# Patient Record
Sex: FEMALE | Race: WHITE | Employment: UNEMPLOYED | URBAN - METROPOLITAN AREA
[De-identification: names, ages, dates, MRNs, and addresses within clinical notes are randomized per-mention and may not be internally consistent; named-entity substitution may affect disease eponyms.]

---

## 2020-04-03 ENCOUNTER — APPOINTMENT (OUTPATIENT)
Dept: VASCULAR SURGERY | Age: 60
DRG: 045 | End: 2020-04-03
Attending: INTERNAL MEDICINE
Payer: MEDICAID

## 2020-04-03 ENCOUNTER — APPOINTMENT (OUTPATIENT)
Dept: CT IMAGING | Age: 60
DRG: 045 | End: 2020-04-03
Attending: STUDENT IN AN ORGANIZED HEALTH CARE EDUCATION/TRAINING PROGRAM
Payer: MEDICAID

## 2020-04-03 ENCOUNTER — HOSPITAL ENCOUNTER (INPATIENT)
Age: 60
LOS: 2 days | Discharge: HOME HEALTH | DRG: 045 | End: 2020-04-05
Attending: STUDENT IN AN ORGANIZED HEALTH CARE EDUCATION/TRAINING PROGRAM | Admitting: INTERNAL MEDICINE
Payer: MEDICAID

## 2020-04-03 ENCOUNTER — APPOINTMENT (OUTPATIENT)
Dept: MRI IMAGING | Age: 60
DRG: 045 | End: 2020-04-03
Attending: INTERNAL MEDICINE
Payer: MEDICAID

## 2020-04-03 ENCOUNTER — APPOINTMENT (OUTPATIENT)
Dept: NON INVASIVE DIAGNOSTICS | Age: 60
DRG: 045 | End: 2020-04-03
Attending: INTERNAL MEDICINE
Payer: MEDICAID

## 2020-04-03 DIAGNOSIS — R47.01 APHASIA: Primary | ICD-10-CM

## 2020-04-03 DIAGNOSIS — R53.1 RIGHT SIDED WEAKNESS: ICD-10-CM

## 2020-04-03 DIAGNOSIS — E78.5 DYSLIPIDEMIA: ICD-10-CM

## 2020-04-03 DIAGNOSIS — Q21.12 PFO (PATENT FORAMEN OVALE): ICD-10-CM

## 2020-04-03 DIAGNOSIS — I65.23 BILATERAL CAROTID ARTERY STENOSIS: ICD-10-CM

## 2020-04-03 DIAGNOSIS — I63.9 ACUTE CVA (CEREBROVASCULAR ACCIDENT) (HCC): ICD-10-CM

## 2020-04-03 PROBLEM — E03.9 HYPOTHYROIDISM: Status: ACTIVE | Noted: 2020-04-03

## 2020-04-03 PROBLEM — J44.9 COPD (CHRONIC OBSTRUCTIVE PULMONARY DISEASE) (HCC): Status: ACTIVE | Noted: 2020-04-03

## 2020-04-03 PROBLEM — I10 HTN (HYPERTENSION), BENIGN: Status: ACTIVE | Noted: 2020-04-03

## 2020-04-03 LAB
ALBUMIN SERPL-MCNC: 3.8 G/DL (ref 3.5–5)
ALBUMIN/GLOB SERPL: 1 {RATIO} (ref 1.1–2.2)
ALP SERPL-CCNC: 79 U/L (ref 45–117)
ALT SERPL-CCNC: 20 U/L (ref 12–78)
ANION GAP SERPL CALC-SCNC: 6 MMOL/L (ref 5–15)
APPEARANCE UR: CLEAR
AST SERPL-CCNC: 9 U/L (ref 15–37)
AV VELOCITY RATIO: 0.59
BACTERIA URNS QL MICRO: NEGATIVE /HPF
BASOPHILS # BLD: 0 K/UL (ref 0–0.1)
BASOPHILS NFR BLD: 0 % (ref 0–1)
BILIRUB SERPL-MCNC: 0.2 MG/DL (ref 0.2–1)
BILIRUB UR QL: NEGATIVE
BUN SERPL-MCNC: 13 MG/DL (ref 6–20)
BUN/CREAT SERPL: 18 (ref 12–20)
CALCIUM SERPL-MCNC: 9.2 MG/DL (ref 8.5–10.1)
CHLORIDE SERPL-SCNC: 110 MMOL/L (ref 97–108)
CO2 SERPL-SCNC: 22 MMOL/L (ref 21–32)
COLOR UR: ABNORMAL
COMMENT, HOLDF: NORMAL
CREAT SERPL-MCNC: 0.74 MG/DL (ref 0.55–1.02)
DIFFERENTIAL METHOD BLD: ABNORMAL
ECHO AO ROOT DIAM: 3.4 CM
ECHO AV AREA PEAK VELOCITY: 1.9 CM2
ECHO AV PEAK GRADIENT: 6.6 MMHG
ECHO AV PEAK VELOCITY: 128.13 CM/S
ECHO EST RA PRESSURE: 3 MMHG
ECHO LA MAJOR AXIS: 2.93 CM
ECHO LA TO AORTIC ROOT RATIO: 0.86
ECHO LA VOL 2C: 36.59 ML (ref 22–52)
ECHO LA VOL 4C: 25.82 ML (ref 22–52)
ECHO LA VOL BP: 34.49 ML (ref 22–52)
ECHO LA VOL/BSA BIPLANE: 22.19 ML/M2 (ref 16–28)
ECHO LA VOLUME INDEX A2C: 23.54 ML/M2 (ref 16–28)
ECHO LA VOLUME INDEX A4C: 16.61 ML/M2 (ref 16–28)
ECHO LV INTERNAL DIMENSION DIASTOLIC: 4.16 CM (ref 3.9–5.3)
ECHO LV INTERNAL DIMENSION SYSTOLIC: 2.99 CM
ECHO LV IVSD: 0.82 CM (ref 0.6–0.9)
ECHO LV MASS 2D: 100.2 G (ref 67–162)
ECHO LV MASS INDEX 2D: 64.5 G/M2 (ref 43–95)
ECHO LV POSTERIOR WALL DIASTOLIC: 0.68 CM (ref 0.6–0.9)
ECHO LVOT DIAM: 2.01 CM
ECHO LVOT PEAK GRADIENT: 2.3 MMHG
ECHO LVOT PEAK VELOCITY: 75.14 CM/S
ECHO MV A VELOCITY: 91.74 CM/S
ECHO MV E DECELERATION TIME (DT): 254.2 MS
ECHO MV E VELOCITY: 68.84 CM/S
ECHO MV E/A RATIO: 0.75
ECHO MV REGURGITANT PEAK GRADIENT: 32.4 MMHG
ECHO MV REGURGITANT PEAK VELOCITY: 284.82 CM/S
ECHO PULMONARY ARTERY SYSTOLIC PRESSURE (PASP): 29.3 MMHG
ECHO PV MAX VELOCITY: 68.34 CM/S
ECHO PV PEAK GRADIENT: 1.9 MMHG
ECHO RIGHT VENTRICULAR SYSTOLIC PRESSURE (RVSP): 29.3 MMHG
ECHO RV INTERNAL DIMENSION: 3.01 CM
ECHO TV REGURGITANT MAX VELOCITY: 256.57 CM/S
ECHO TV REGURGITANT PEAK GRADIENT: 26.3 MMHG
EOSINOPHIL # BLD: 0.1 K/UL (ref 0–0.4)
EOSINOPHIL NFR BLD: 1 % (ref 0–7)
EPITH CASTS URNS QL MICRO: ABNORMAL /LPF
ERYTHROCYTE [DISTWIDTH] IN BLOOD BY AUTOMATED COUNT: 13.4 % (ref 11.5–14.5)
EST. AVERAGE GLUCOSE BLD GHB EST-MCNC: 114 MG/DL
GLOBULIN SER CALC-MCNC: 3.8 G/DL (ref 2–4)
GLUCOSE SERPL-MCNC: 111 MG/DL (ref 65–100)
GLUCOSE UR STRIP.AUTO-MCNC: NEGATIVE MG/DL
HBA1C MFR BLD: 5.6 % (ref 4–5.6)
HCT VFR BLD AUTO: 46.8 % (ref 35–47)
HGB BLD-MCNC: 15.5 G/DL (ref 11.5–16)
HGB UR QL STRIP: ABNORMAL
HYALINE CASTS URNS QL MICRO: ABNORMAL /LPF (ref 0–5)
IMM GRANULOCYTES # BLD AUTO: 0 K/UL (ref 0–0.04)
IMM GRANULOCYTES NFR BLD AUTO: 0 % (ref 0–0.5)
KETONES UR QL STRIP.AUTO: NEGATIVE MG/DL
LEUKOCYTE ESTERASE UR QL STRIP.AUTO: NEGATIVE
LVFS 2D: 28.31 %
LYMPHOCYTES # BLD: 2.5 K/UL (ref 0.8–3.5)
LYMPHOCYTES NFR BLD: 29 % (ref 12–49)
MCH RBC QN AUTO: 32.4 PG (ref 26–34)
MCHC RBC AUTO-ENTMCNC: 33.1 G/DL (ref 30–36.5)
MCV RBC AUTO: 97.9 FL (ref 80–99)
MONOCYTES # BLD: 0.7 K/UL (ref 0–1)
MONOCYTES NFR BLD: 8 % (ref 5–13)
MV DEC SLOPE: 2.71
NEUTS SEG # BLD: 5.3 K/UL (ref 1.8–8)
NEUTS SEG NFR BLD: 62 % (ref 32–75)
NITRITE UR QL STRIP.AUTO: NEGATIVE
NRBC # BLD: 0 K/UL (ref 0–0.01)
NRBC BLD-RTO: 0 PER 100 WBC
PH UR STRIP: 7 [PH] (ref 5–8)
PLATELET # BLD AUTO: 223 K/UL (ref 150–400)
PMV BLD AUTO: 8.8 FL (ref 8.9–12.9)
POTASSIUM SERPL-SCNC: 4.4 MMOL/L (ref 3.5–5.1)
PROT SERPL-MCNC: 7.6 G/DL (ref 6.4–8.2)
PROT UR STRIP-MCNC: NEGATIVE MG/DL
PULMONARY ARTERY END DIASTOLIC PRESSURE: 6.7 MMHG
PULMONARY ARTERY MEAN PRESURE: 14.3 MMHG
PV END DIASTOLIC VELOCITY: 1 MMHG
RBC # BLD AUTO: 4.78 M/UL (ref 3.8–5.2)
RBC #/AREA URNS HPF: ABNORMAL /HPF (ref 0–5)
SAMPLES BEING HELD,HOLD: NORMAL
SODIUM SERPL-SCNC: 138 MMOL/L (ref 136–145)
SP GR UR REFRACTOMETRY: 1.03 (ref 1–1.03)
T3FREE SERPL-MCNC: 3.4 PG/ML (ref 2.2–4)
T4 FREE SERPL-MCNC: 1.4 NG/DL (ref 0.8–1.5)
TROPONIN I SERPL-MCNC: <0.05 NG/ML
TSH SERPL DL<=0.05 MIU/L-ACNC: 0.8 UIU/ML (ref 0.36–3.74)
UR CULT HOLD, URHOLD: NORMAL
UROBILINOGEN UR QL STRIP.AUTO: 0.2 EU/DL (ref 0.2–1)
WBC # BLD AUTO: 8.6 K/UL (ref 3.6–11)
WBC URNS QL MICRO: ABNORMAL /HPF (ref 0–4)

## 2020-04-03 PROCEDURE — 84481 FREE ASSAY (FT-3): CPT

## 2020-04-03 PROCEDURE — 74011636320 HC RX REV CODE- 636/320

## 2020-04-03 PROCEDURE — 70450 CT HEAD/BRAIN W/O DYE: CPT

## 2020-04-03 PROCEDURE — 80053 COMPREHEN METABOLIC PANEL: CPT

## 2020-04-03 PROCEDURE — 92610 EVALUATE SWALLOWING FUNCTION: CPT

## 2020-04-03 PROCEDURE — 36415 COLL VENOUS BLD VENIPUNCTURE: CPT

## 2020-04-03 PROCEDURE — 85025 COMPLETE CBC W/AUTO DIFF WBC: CPT

## 2020-04-03 PROCEDURE — 84484 ASSAY OF TROPONIN QUANT: CPT

## 2020-04-03 PROCEDURE — 81001 URINALYSIS AUTO W/SCOPE: CPT

## 2020-04-03 PROCEDURE — 70496 CT ANGIOGRAPHY HEAD: CPT

## 2020-04-03 PROCEDURE — 99285 EMERGENCY DEPT VISIT HI MDM: CPT

## 2020-04-03 PROCEDURE — 80307 DRUG TEST PRSMV CHEM ANLYZR: CPT

## 2020-04-03 PROCEDURE — 74011250637 HC RX REV CODE- 250/637: Performed by: INTERNAL MEDICINE

## 2020-04-03 PROCEDURE — 65660000000 HC RM CCU STEPDOWN

## 2020-04-03 PROCEDURE — 93005 ELECTROCARDIOGRAM TRACING: CPT

## 2020-04-03 PROCEDURE — 83036 HEMOGLOBIN GLYCOSYLATED A1C: CPT

## 2020-04-03 PROCEDURE — 99218 HC RM OBSERVATION: CPT

## 2020-04-03 PROCEDURE — 93306 TTE W/DOPPLER COMPLETE: CPT

## 2020-04-03 PROCEDURE — 70551 MRI BRAIN STEM W/O DYE: CPT

## 2020-04-03 PROCEDURE — 93970 EXTREMITY STUDY: CPT

## 2020-04-03 PROCEDURE — 84439 ASSAY OF FREE THYROXINE: CPT

## 2020-04-03 PROCEDURE — 84443 ASSAY THYROID STIM HORMONE: CPT

## 2020-04-03 PROCEDURE — 74011250636 HC RX REV CODE- 250/636: Performed by: INTERNAL MEDICINE

## 2020-04-03 PROCEDURE — 92523 SPEECH SOUND LANG COMPREHEN: CPT

## 2020-04-03 RX ORDER — LEVOTHYROXINE SODIUM 175 UG/1
175 TABLET ORAL
COMMUNITY
End: 2021-01-12 | Stop reason: DRUGHIGH

## 2020-04-03 RX ORDER — SODIUM CHLORIDE 9 MG/ML
75 INJECTION, SOLUTION INTRAVENOUS CONTINUOUS
Status: DISPENSED | OUTPATIENT
Start: 2020-04-03 | End: 2020-04-04

## 2020-04-03 RX ORDER — ASPIRIN 325 MG
325 TABLET ORAL DAILY
Status: DISCONTINUED | OUTPATIENT
Start: 2020-04-04 | End: 2020-04-03

## 2020-04-03 RX ORDER — ENOXAPARIN SODIUM 100 MG/ML
40 INJECTION SUBCUTANEOUS EVERY 24 HOURS
Status: DISCONTINUED | OUTPATIENT
Start: 2020-04-03 | End: 2020-04-03

## 2020-04-03 RX ORDER — ACETAMINOPHEN 325 MG/1
650 TABLET ORAL
Status: DISCONTINUED | OUTPATIENT
Start: 2020-04-03 | End: 2020-04-05 | Stop reason: HOSPADM

## 2020-04-03 RX ORDER — ONDANSETRON 2 MG/ML
2 INJECTION INTRAMUSCULAR; INTRAVENOUS
Status: DISCONTINUED | OUTPATIENT
Start: 2020-04-03 | End: 2020-04-05 | Stop reason: HOSPADM

## 2020-04-03 RX ORDER — FAMOTIDINE 20 MG/1
20 TABLET, FILM COATED ORAL EVERY 12 HOURS
Status: DISCONTINUED | OUTPATIENT
Start: 2020-04-03 | End: 2020-04-05 | Stop reason: HOSPADM

## 2020-04-03 RX ORDER — GUAIFENESIN 100 MG/5ML
81 LIQUID (ML) ORAL DAILY
Status: DISCONTINUED | OUTPATIENT
Start: 2020-04-04 | End: 2020-04-05 | Stop reason: HOSPADM

## 2020-04-03 RX ORDER — ALBUTEROL SULFATE 90 UG/1
2 AEROSOL, METERED RESPIRATORY (INHALATION)
COMMUNITY

## 2020-04-03 RX ORDER — LOVASTATIN 40 MG/1
40 TABLET ORAL
COMMUNITY
End: 2020-04-05

## 2020-04-03 RX ORDER — IPRATROPIUM BROMIDE AND ALBUTEROL SULFATE 2.5; .5 MG/3ML; MG/3ML
3 SOLUTION RESPIRATORY (INHALATION)
Status: DISCONTINUED | OUTPATIENT
Start: 2020-04-03 | End: 2020-04-05 | Stop reason: HOSPADM

## 2020-04-03 RX ORDER — ACETAMINOPHEN 650 MG/1
650 SUPPOSITORY RECTAL
Status: DISCONTINUED | OUTPATIENT
Start: 2020-04-03 | End: 2020-04-05 | Stop reason: HOSPADM

## 2020-04-03 RX ORDER — ENOXAPARIN SODIUM 100 MG/ML
1 INJECTION SUBCUTANEOUS EVERY 12 HOURS
Status: DISCONTINUED | OUTPATIENT
Start: 2020-04-03 | End: 2020-04-04

## 2020-04-03 RX ORDER — RISEDRONATE SODIUM 35 MG/1
35 TABLET, FILM COATED ORAL
COMMUNITY
End: 2021-08-04

## 2020-04-03 RX ORDER — ATORVASTATIN CALCIUM 20 MG/1
80 TABLET, FILM COATED ORAL
Status: DISCONTINUED | OUTPATIENT
Start: 2020-04-03 | End: 2020-04-05 | Stop reason: HOSPADM

## 2020-04-03 RX ORDER — BUDESONIDE AND FORMOTEROL FUMARATE DIHYDRATE 160; 4.5 UG/1; UG/1
2 AEROSOL RESPIRATORY (INHALATION) 2 TIMES DAILY
COMMUNITY
End: 2022-04-04

## 2020-04-03 RX ORDER — LABETALOL HCL 20 MG/4 ML
20 SYRINGE (ML) INTRAVENOUS
Status: DISCONTINUED | OUTPATIENT
Start: 2020-04-03 | End: 2020-04-05 | Stop reason: HOSPADM

## 2020-04-03 RX ADMIN — ATORVASTATIN CALCIUM 80 MG: 20 TABLET, FILM COATED ORAL at 17:09

## 2020-04-03 RX ADMIN — ENOXAPARIN SODIUM 60 MG: 100 INJECTION SUBCUTANEOUS at 17:11

## 2020-04-03 RX ADMIN — SODIUM CHLORIDE 75 ML/HR: 900 INJECTION, SOLUTION INTRAVENOUS at 17:21

## 2020-04-03 RX ADMIN — FAMOTIDINE 20 MG: 20 TABLET, FILM COATED ORAL at 20:53

## 2020-04-03 RX ADMIN — IOPAMIDOL 100 ML: 755 INJECTION, SOLUTION INTRAVENOUS at 11:25

## 2020-04-03 RX ADMIN — ACETAMINOPHEN 650 MG: 325 TABLET ORAL at 17:11

## 2020-04-03 NOTE — PROGRESS NOTES
Spiritual Care Assessment/Progress Note  [address]      NAME: Clarita Ramirez      MRN: [id number]  AGE: 63 y.o. SEX: female  Mandaen Affiliation: No Hindu   Language: English     4/3/2020     Total Time (in minutes): 11     Spiritual Assessment begun in [facility] EMERGENCY DEPT through conversation with:         [x]Patient        [] Family    [] Friend(s)        Reason for Consult: Other (comment)(Code Stroke)     Spiritual beliefs: (Please include comment if needed)     [] Identifies with a lex tradition:         [] Supported by a lex community:            [] Claims no spiritual orientation:           [] Seeking spiritual identity:                [] Adheres to an individual form of spirituality:           [x] Not able to assess:                           Identified resources for coping:      [] Prayer                               [] Music                  [] Guided Imagery     [] Family/friends                 [] Pet visits     [] Devotional reading                         [x] Unknown     [] Other:                                         Interventions offered during this visit: (See comments for more details)    Patient Interventions: Other (comment)(Presence)           Plan of Care:     [] Support spiritual and/or cultural needs    [] Support AMD and/or advance care planning process      [] Support grieving process   [] Coordinate Rites and/or Rituals    [] Coordination with community clergy   [] No spiritual needs identified at this time   [] Detailed Plan of Care below (See Comments)  [] Make referral to Music Therapy  [] Make referral to Pet Therapy     [] Make referral to Addiction services  [] Make referral to [facility]  [] Make referral to Spiritual Care Partner  [] No future visits requested        [x] Follow up visits as needed     Comments:   responded to a medical alert at the ER.  was in the ER when pt arrived, she was sent immediately for testing by staff. Visited by: Mireya Hayes.   To page : [phone])

## 2020-04-03 NOTE — CONSULTS
[facility]: [id number] [address] Neurology  [address]  [phone]      Name:   Kelsey Fontaine record #: [id number]  Admission Date: 4/3/2020     Who Consulted: Dr. Joseph Schaeffer    Reason for Consult:  Aphasia    HISTORY OF PRESENT ILLNESS:     This is a 63 y.o. female who is admitted for slurred speech, right sided weakness. Ms. Yesy Claros presented to the ED slurred speech associated with right sided upper more than lower extremity weakness. The Neurology Service is asked to evaluate for stroke. Neuro-imaging:     CT Head: No evidence of acute intracranial abnormality.     CTA Head:  1. No evidence of significant stenosis or aneurysm.     CTA Neck:  1. No evidence of flow-limiting stenosis. 2. Dense calcific atherosclerosis of the left carotid bifurcation with approximately 60% stenosis of the distal left common carotid artery, and less than 50% stenosis of the proximal left internal carotid artery. 3. Dense calcific atherosclerosis of the right carotid bifurcation with less than 50% stenosis of the proximal right internal carotid artery. Carotid Doppler:     EKG: Sinus rhythm with sinus arrhythmia with occasional premature ventricular complexes. Care Plan discussed with:  Patient x   Family    RN    Care Manager    Consultant/Specialist:         Thank you for allowing the Neurology Service the pleasure of participating in the care of your patient. This patient will be discussed with my collaborating care team physician,  Dr. Liss Sanz, and he may have further recommendations regarding this patient's care      Eliciajose antonio Altamirano Naldo, ACNP-BC  ====================      Attending Attestation:     I have reviewed the documentation provided by the nurse practitioner, Mrs. Adarsh Hitchcock, and we have discussed her findings and the clinical impression. I have formulated with her the proposed management plans for this patient.   Additionally,  I have personally evaluated the patient to verify the history and to confirm physical findings. Below are my additional comments:    Patient is a 56-year-old right-handed white female with history of COPD, osteoporosis and hyperlipidemia who was admitted from the ER for 3/20/2020 for an acute stroke. Condition started night prior to admission about 10 in the evening when patient noted issues with difficulty finding the words she wanted to say and was associated with right arm weakness with numbness and tingling. Also noted some issues with slurred speech. This was happening intermittently. Persistence led to patient calling EMS and was brought to the ER. In the ER blood pressure was 163/85. Laboratory work-up was unremarkable. Head CT without contrast did not reveal any acute process. Head CTA did not reveal any flow-limiting stenosis or aneurysm. Neck CTA revealed 60% distal left common carotid artery stenosis and less than 50% proximal bilateral ICA stenosis. Echocardiogram revealed EF of 50 to 55% with a large PFO. Lower extremity Doppler studies were unremarkable. No DVT. Brain MRI revealed multiple small acute left posterior frontal/parietal and posterior lateral occipital cortex infarcts. Suspicious for embolus. White matter disease. When seen, patient reports persistence of right arm weakness but improved in strength. No issues with slurred speech or slowness or difficulty of her thought process. PHYSICAL EXAM:    NEUROLOGICAL EXAM:    Appearance: The patient is well developed, well nourished, provides a coherent history and is in no acute distress. Mental Status: Oriented to time, place and person. Fluent, no aphasia or dysarthria. Mood and affect appropriate. Cranial Nerves:   Intact visual fields. VANESSA, EOM's full, no nystagmus, no ptosis. Facial sensation is normal. Corneal reflexes are intact. Facial movement is symmetric. Hearing is normal bilaterally. Palate is midline with normal elevation. Sternocleidomastoid and trapezius muscles are normal. Tongue is midline. Motor:  4/5 RUE strength and 5/5 RLE and left extremity. Normal bulk and tone. No fasciculations. Right UE pronation but no drift. Reflexes:   Slight hyperreflexic on the right. Downgoing toes. Sensory:   Normal to cold and vibration. Gait:  Slightly favors right leg. No Romberg. Tremor:   No tremor noted. Cerebellar:  Intact FTN/JOSÉ MIGUEL but slow on the right. Intact bilateral HTS. Neurovascular:  Normal heart sounds and regular rhythm, peripheral pulses intact, and no carotid bruits. A/P Left hemisphere embolic infarcts, PFO    Neurological examination reveals residual mild right upper extremity weakness and hyperreflexia. Status post left hemisphere small infarcts suspicious for embolic phenomenon. Head CT without contrast did not reveal any acute process. Brain MRI revealed multiple small acute left posterior frontal/parietal and posterior lateral occipital cortex infarct. Head CTA did not reveal any flow limiting stenosis or aneurysm. Neck CTA revealed 60% distal left common carotid artery stenosis and less than 50% bilateral proximal ICA stenosis. No intervention necessary at this time. Monitor for now. Lipid panel is pending. If LDL is greater than 70 and then adjust statin therapy. Echocardiogram revealed EF of 50 to 55% with a large PFO. Appreciate cardiology input. Plan is to put a loop recorder to assess for cardiac arrhythmia. From a neurological standpoint, I would recommend closure of PFO. Lower extremity Doppler studies were negative for DVT. Hypercoagulable work-up was ordered. Continue aspirin 81 mg daily for stroke prophylaxis. Continue OT and PT. Patient was advised that per [place] regulation she cannot drive for 6 months. Patient is okay to be discharged from a neurological standpoint once work-up is done. Have patient follow-up in my clinic 4 weeks after discharge.   Thank you for the consult. Assessment/Plan:     1. Transient Ischemic Attack, r/o Stroke:    · ASA 81 mg  · Will need ASA at discharge  · Neurochecks:  Every 4 hours  · Blood Sugar Goal:  140-180  · BP Goal: Less than 180/105 for 24 hours  · Telemetry for at least 24 hours    Stroke work up in progress  · A1C:  · LDL:    · TTE:    · MRI:  · Carotid vascular imaging:    Risk factors for stroke include:  DM, hx of afib, HTN, CAD, HLD, Tobacco use, physical inactivity  · Discussed with patient    · Discussed signs/symptoms of stroke and when to call 911  · Smoking cessation education if appropriate    2. Mobility:   · Has not been OOB. · PT/OT to eval for rehab    3. Diet:    · NPO until STAND    4. VTE Prophylaxes:   · Per Primary team           Review of Systems: 10 point ROS was performed. Pertinent positives listed in HPI. Negative ROS is as follows. Pt denies: angina, palpitations, paresthesias, weakness, vision loss, confusion, fever, chills, falls, headache, diplopia, back pain, neck pain, prior episodes of vertigo, hallucinations, new medications or dosage changes. Allergies:   No Known Allergies    Outpatient Meds  No current facility-administered medications on file prior to encounter. Current Outpatient Medications on File Prior to Encounter   Medication Sig Dispense Refill    budesonide-formoteroL (Symbicort) 160-4.5 mcg/actuation HFAA Take 2 Puffs by inhalation two (2) times a day.  levothyroxine (SYNTHROID) 200 mcg tablet Take 200 mcg by mouth Daily (before breakfast).  lovastatin (MEVACOR) 40 mg tablet Take 40 mg by mouth nightly.  risedronate (ActoneL) 35 mg tablet Take 35 mg by mouth every seven (7) days.  albuterol (PROVENTIL HFA, VENTOLIN HFA, PROAIR HFA) 90 mcg/actuation inhaler Take 2 Puffs by inhalation every six (6) hours as needed for Wheezing.          Inpatient Meds    Current Facility-Administered Medications   Medication Dose Route Frequency Provider Last Rate Last Dose    0.9% sodium chloride infusion  75 mL/hr IntraVENous CONTINUOUS Bessy Early MD        ondansetron [facility]) injection 2 mg  2 mg IntraVENous Q6H PRN Bessy Early MD        atorvastatin (LIPITOR) tablet 80 mg  80 mg Oral QHS Bessy Early MD        labetaloL (NORMODYNE;TRANDATE) 20 mg/4 mL (5 mg/mL) injection 20 mg  20 mg IntraVENous Q10MIN PRN Bessy Early MD        famotidine (PEPCID) tablet 20 mg  20 mg Oral Q12H Bessy Early MD        acetaminophen (TYLENOL) tablet 650 mg  650 mg Oral Q4H PRN Bessy Early MD        Or    acetaminophen (TYLENOL) solution 650 mg  650 mg Per NG tube Q4H PRN Bessy Early MD        Or    acetaminophen (TYLENOL) suppository 650 mg  650 mg Rectal Q4H PRN Bessy Early MD        [START ON 4/4/2020] aspirin chewable tablet 81 mg  81 mg Oral DAILY Fadumo HANKS NP        enoxaparin (LOVENOX) injection 60 mg  1 mg/kg SubCUTAneous Q12H Bessy Early MD               Past Medical History:   Diagnosis Date    A-fib [facility])     [facility] obstructive pulmonary disease ([facility]     Diabetes ([facility]     Borderline    Heart attack ([facility]     Hypercholesteremia     Hypertension     Stroke ([facility]     Thyroid disease        Past Surgical History:   Procedure Laterality Date    CARDIAC SURG PROCEDURE UNLIST      cath    HX APPENDECTOMY      HX CHOLECYSTECTOMY      HX GYN      hysterectomy       family history is not on file. reports that she has been smoking. She has been smoking about 1.00 pack per day. She has never used smokeless tobacco. She reports that she does not drink alcohol or use drugs.            Lab Results (last 24 hrs)  Recent Results (from the past 24 hour(s))   EKG, 12 LEAD, INITIAL    Collection Time: 04/03/20 11:58 AM   Result Value Ref Range    Ventricular Rate 77 BPM    Atrial Rate 77 BPM    P-R Interval 118 ms    QRS Duration 86 ms    Q-T Interval 420 ms    QTC Calculation (Bezet) 475 ms    Calculated P Axis 60 degrees    Calculated R Axis 23 degrees    Calculated T Axis 58 degrees    Diagnosis       Sinus rhythm with sinus arrhythmia with occasional premature ventricular   complexes  Right atrial enlargement  Borderline ECG  No previous ECGs available     CBC WITH AUTOMATED DIFF    Collection Time: 04/03/20 12:04 PM   Result Value Ref Range    WBC 8.6 3.6 - 11.0 K/uL    RBC 4.78 3.80 - 5.20 M/uL    HGB 15.5 11.5 - 16.0 g/dL    HCT 46.8 35.0 - 47.0 %    MCV 97.9 80.0 - 99.0 FL    MCH 32.4 26.0 - 34.0 PG    MCHC 33.1 30.0 - 36.5 g/dL    RDW 13.4 11.5 - 14.5 %    PLATELET 698 925 - 969 K/uL    MPV 8.8 (L) 8.9 - 12.9 FL    NRBC 0.0 0  WBC    ABSOLUTE NRBC 0.00 0.00 - 0.01 K/uL    NEUTROPHILS 62 32 - 75 %    LYMPHOCYTES 29 12 - 49 %    MONOCYTES 8 5 - 13 %    EOSINOPHILS 1 0 - 7 %    BASOPHILS 0 0 - 1 %    IMMATURE GRANULOCYTES 0 0.0 - 0.5 %    ABS. NEUTROPHILS 5.3 1.8 - 8.0 K/UL    ABS. LYMPHOCYTES 2.5 0.8 - 3.5 K/UL    ABS. MONOCYTES 0.7 0.0 - 1.0 K/UL    ABS. EOSINOPHILS 0.1 0.0 - 0.4 K/UL    ABS. BASOPHILS 0.0 0.0 - 0.1 K/UL    ABS. IMM. GRANS. 0.0 0.00 - 0.04 K/UL    DF AUTOMATED     METABOLIC PANEL, COMPREHENSIVE    Collection Time: 04/03/20 12:04 PM   Result Value Ref Range    Sodium 138 136 - 145 mmol/L    Potassium 4.4 3.5 - 5.1 mmol/L    Chloride 110 (H) 97 - 108 mmol/L    CO2 22 21 - 32 mmol/L    Anion gap 6 5 - 15 mmol/L    Glucose 111 (H) 65 - 100 mg/dL    BUN 13 6 - 20 MG/DL    Creatinine 0.74 0.55 - 1.02 MG/DL    BUN/Creatinine ratio 18 12 - 20      GFR est AA >60 >60 ml/min/1.73m2    GFR est non-AA >60 >60 ml/min/1.73m2    Calcium 9.2 8.5 - 10.1 MG/DL    Bilirubin, total 0.2 0.2 - 1.0 MG/DL    ALT (SGPT) 20 12 - 78 U/L    AST (SGOT) 9 (L) 15 - 37 U/L    Alk.  phosphatase 79 45 - 117 U/L    Protein, total 7.6 6.4 - 8.2 g/dL    Albumin 3.8 3.5 - 5.0 g/dL    Globulin 3.8 2.0 - 4.0 g/dL    A-G Ratio 1.0 (L) 1.1 - 2.2     TROPONIN I    Collection Time: 04/03/20 12:04 PM   Result Value Ref Range Troponin-I, Qt. <0.05 <0.05 ng/mL   SAMPLES BEING HELD    Collection Time: 04/03/20 12:04 PM   Result Value Ref Range    SAMPLES BEING HELD 1RD,1SST,1BL     COMMENT        Add-on orders for these samples will be processed based on acceptable specimen integrity and analyte stability, which may vary by analyte. TSH 3RD GENERATION    Collection Time: 04/03/20 12:04 PM   Result Value Ref Range    TSH 0.80 0.36 - 3.74 uIU/mL   URINALYSIS W/ RFLX MICROSCOPIC    Collection Time: 04/03/20 12:36 PM   Result Value Ref Range    Color YELLOW/STRAW      Appearance CLEAR CLEAR      Specific gravity 1.030 1.003 - 1.030      pH (UA) 7.0 5.0 - 8.0      Protein NEGATIVE  NEG mg/dL    Glucose NEGATIVE  NEG mg/dL    Ketone NEGATIVE  NEG mg/dL    Bilirubin NEGATIVE  NEG      Blood TRACE (A) NEG      Urobilinogen 0.2 0.2 - 1.0 EU/dL    Nitrites NEGATIVE  NEG      Leukocyte Esterase NEGATIVE  NEG      WBC 0-4 0 - 4 /hpf    RBC 0-5 0 - 5 /hpf    Epithelial cells FEW FEW /lpf    Bacteria NEGATIVE  NEG /hpf    Hyaline cast 0-2 0 - 5 /lpf   URINE CULTURE HOLD SAMPLE    Collection Time: 04/03/20 12:36 PM   Result Value Ref Range    Urine culture hold        Urine on hold in Microbiology dept for 2 days. If unpreserved urine is submitted, it cannot be used for addtional testing after 24 hours, recollection will be required.    ECHO ADULT COMPLETE    Collection Time: 04/03/20  3:07 PM   Result Value Ref Range    Aortic Valve Systolic Peak Velocity 942.71 cm/s    Aortic Valve Area by Continuity of Peak Velocity 1.9 cm2    AoV PG 6.6 mmHg    LVIDd 4.16 3.9 - 5.3 cm    LVPWd 0.68 0.6 - 0.9 cm    LVIDs 2.99 cm    IVSd 0.82 0.6 - 0.9 cm    LVOT d 2.01 cm    LVOT Peak Velocity 75.14 cm/s    LVOT Peak Gradient 2.3 mmHg    MV A Fadi 91.74 cm/s    MV E Fadi 68.84 cm/s    MV E/A 0.75     Left Atrium to Aortic Root Ratio 0.86     RVIDd 3.01 cm    LA Vol 4C 25.82 22 - 52 mL    LA Vol 2C 36.59 22 - 52 mL    LV Mass .2 67 - 162 g    LV Mass AL Index 64.5 43 - 95 g/m2    RVSP 29.3 mmHg    Est. RA Pressure 3.0 mmHg    Mitral Regurgitant Peak Velocity 284.82 cm/s    Mitral Valve E Wave Deceleration Time 254.2 ms    Left Atrium Major Axis 2.93 cm    Triscuspid Valve Regurgitation Peak Gradient 26.3 mmHg    Pulmonic Valve Max Velocity 68.34 cm/s    TR Max Velocity 256.57 cm/s    PASP 29.3 mmHg    LA Vol Index 23.54 16 - 28 ml/m2    LA Vol Index 16.61 16 - 28 ml/m2    MR Peak Gradient 32.4 mmHg    Left Ventricular Fractional Shortening by 2D [id number] %    PV End Diastolic Velocity 1.0 mmHg    Mitral Valve Deceleration Izard [id number]     AV Velocity Ratio 0.59     Pulmonary Artery Mean Presure 14.3 mmHg    PI End Diastolic Pressure 6.7 mmHg    PV peak gradient 1.9 mmHg    LA Volume 34.49 22 - 52 mL    Ao Root D 3.40 cm    LA Vol Index 22.19 16 - 28 ml/m2

## 2020-04-03 NOTE — CONSULTS
Shai Gutierrez MD [facility] [address]  Office [phone]  Mobile [phone]    Date of  Admission: 4/3/2020 11:13 AM  PCP- Emiliana Keller NP    Chris Owen is a 63 y.o. female admitted for Right sided weakness [R53.1]; Acute CVA (cerebrovascular accident) ([address] [I63.9]. Consult requested by Furman Spatz, MD for evaluation of PFO    Assessment  · Acute left frontoparietal stroke possibly cardioembolic in etiology  · HTN  · Large PFO by echo  · Nonobstructive carotid disease  · Nicotine dependence  · COPD        Discussion/Recommendations:  Presentation not typical for paradoxical stroke and she has multiple atherosclerotic vascular RFs, also at risk for AF  Venous duplex  antiplt Rx for now  Consider 30 day event recorder vs ILR to fully exclude AF  Consider PFO closure if above negative  Await neuro input    Subjective:  Admitted with left hemispheric stroke with dysarthria and right arm weakness. No lytic given given delayed presentation. She reports somewhat stuttering course. MRI today with left frontoparietal infarction suggestive of embolic etiology. Head/neck CTA with nonobstructive disease. Tele - frequent PVCs, no AF. Echo with significant PFP with right to left shunt, more prominent post Valsalva. Sx improving. Has chronic KNOX, no chest pain. No palpitations. Patient denies any exertional chest pain, palpitations, syncope, orthopnea, edema or paroxysmal nocturnal dyspnea.   No leg swelling  Smokes daily  Hx of heart attack per pt several years ago but no cath        Past Medical History:   Diagnosis Date    Chronic obstructive pulmonary disease ([address]     Diabetes ([address]     Borderline    Hypercholesteremia     Hypertension     Stroke [facility]     Thyroid disease       Past Surgical History:   Procedure Laterality Date    CARDIAC SURG PROCEDURE UNLIST      cath    HX APPENDECTOMY      HX CHOLECYSTECTOMY      HX GYN hysterectomy     No current facility-administered medications on file prior to encounter. Current Outpatient Medications on File Prior to Encounter   Medication Sig Dispense Refill    budesonide-formoteroL (Symbicort) 160-4.5 mcg/actuation HFAA Take 2 Puffs by inhalation two (2) times a day.  levothyroxine (SYNTHROID) 200 mcg tablet Take 200 mcg by mouth Daily (before breakfast).  lovastatin (MEVACOR) 40 mg tablet Take 40 mg by mouth nightly.  risedronate (ActoneL) 35 mg tablet Take 35 mg by mouth every seven (7) days.  albuterol (PROVENTIL HFA, VENTOLIN HFA, PROAIR HFA) 90 mcg/actuation inhaler Take 2 Puffs by inhalation every six (6) hours as needed for Wheezing. No Known Allergies          Review of Symptoms:  Constitutional: negative for fevers, chills, anorexia and weight loss  Respiratory: negative for hemoptysis or pleurisy/chest pain  Gastrointestinal: negative for odynophagia, melena and abdominal pain  Musculoskeletal:negative  Neurological: negative for seizures and memory problems  Other systems reviewed and negative except as above. Physical Exam    Visit Vitals  BP (!) 142/92 (BP 1 Location: Left arm, BP Patient Position: At rest)   Pulse (!) 58   Temp 97.7 °F (36.5 °C)   Resp 12   Ht 5' (1.524 m)   Wt 130 lb (59 kg)   SpO2 98%   BMI 25.39 kg/m²     Visit Vitals  BP (!) 142/92 (BP 1 Location: Left arm, BP Patient Position: At rest)   Pulse (!) 58   Temp 97.7 °F (36.5 °C)   Resp 12   Ht 5' (1.524 m)   Wt 130 lb (59 kg)   SpO2 98%   BMI 25.39 kg/m²     General Appearance:  Well developed, well nourished,alert and oriented x 3, and individual in no acute distress. Ears/Nose/Mouth/Throat:   Hearing grossly normal.         Neck: Supple. Chest:   Lungs clear to auscultation bilaterally. Cardiovascular:  Regular rate and rhythm, S1, S2 normal, no murmur. Abdomen:   Soft, non-tender, bowel sounds are active. Extremities: No edema bilaterally. Skin: Warm and dry. Cardiographics    Telemetry: normal sinus rhythm, frequent PVCs, occ couplets  ECG: normal EKG, normal sinus rhythm, normal sinus rhythm, occasional PVC noted, unifocal      04/03/20   ECHO ADULT COMPLETE 04/03/2020 4/3/2020    Narrative · Saline contrast was given to evaluate for intracardiac shunt. · Normal cavity size and wall thickness. Low normal systolic function. Estimated left ventricular ejection fraction is 50 - 55%. Visually   measured ejection fraction. No regional wall motion abnormality noted. · Agitated saline contrast study was performed and color flow Doppler was   used. detected by saline contrast suggestive of a patent foramen ovale. There was a severe and right to left shunt, with Valsalva maneuver   confirmed by saline contrast suggestive of a patent foramen ovale.         Signed by: Rizwan Montana MD         Recent Results (from the past 12 hour(s))   EKG, 12 LEAD, INITIAL    Collection Time: 04/03/20 11:58 AM   Result Value Ref Range    Ventricular Rate 77 BPM    Atrial Rate 77 BPM    P-R Interval 118 ms    QRS Duration 86 ms    Q-T Interval 420 ms    QTC Calculation (Bezet) 475 ms    Calculated P Axis 60 degrees    Calculated R Axis 23 degrees    Calculated T Axis 58 degrees    Diagnosis       Sinus rhythm with sinus arrhythmia with occasional premature ventricular   complexes  Right atrial enlargement  Borderline ECG  No previous ECGs available     CBC WITH AUTOMATED DIFF    Collection Time: 04/03/20 12:04 PM   Result Value Ref Range    WBC 8.6 3.6 - 11.0 K/uL    RBC 4.78 3.80 - 5.20 M/uL    HGB 15.5 11.5 - 16.0 g/dL    HCT 46.8 35.0 - 47.0 %    MCV 97.9 80.0 - 99.0 FL    MCH 32.4 26.0 - 34.0 PG    MCHC 33.1 30.0 - 36.5 g/dL    RDW 13.4 11.5 - 14.5 %    PLATELET 087 283 - 068 K/uL    MPV 8.8 (L) 8.9 - 12.9 FL    NRBC 0.0 0  WBC    ABSOLUTE NRBC 0.00 0.00 - 0.01 K/uL    NEUTROPHILS 62 32 - 75 %    LYMPHOCYTES 29 12 - 49 %    MONOCYTES 8 5 - 13 %    EOSINOPHILS 1 0 - 7 %    BASOPHILS 0 0 - 1 %    IMMATURE GRANULOCYTES 0 0.0 - 0.5 %    ABS. NEUTROPHILS 5.3 1.8 - 8.0 K/UL    ABS. LYMPHOCYTES 2.5 0.8 - 3.5 K/UL    ABS. MONOCYTES 0.7 0.0 - 1.0 K/UL    ABS. EOSINOPHILS 0.1 0.0 - 0.4 K/UL    ABS. BASOPHILS 0.0 0.0 - 0.1 K/UL    ABS. IMM. GRANS. 0.0 0.00 - 0.04 K/UL    DF AUTOMATED     METABOLIC PANEL, COMPREHENSIVE    Collection Time: 04/03/20 12:04 PM   Result Value Ref Range    Sodium 138 136 - 145 mmol/L    Potassium 4.4 3.5 - 5.1 mmol/L    Chloride 110 (H) 97 - 108 mmol/L    CO2 22 21 - 32 mmol/L    Anion gap 6 5 - 15 mmol/L    Glucose 111 (H) 65 - 100 mg/dL    BUN 13 6 - 20 MG/DL    Creatinine 0.74 0.55 - 1.02 MG/DL    BUN/Creatinine ratio 18 12 - 20      GFR est AA >60 >60 ml/min/1.73m2    GFR est non-AA >60 >60 ml/min/1.73m2    Calcium 9.2 8.5 - 10.1 MG/DL    Bilirubin, total 0.2 0.2 - 1.0 MG/DL    ALT (SGPT) 20 12 - 78 U/L    AST (SGOT) 9 (L) 15 - 37 U/L    Alk. phosphatase 79 45 - 117 U/L    Protein, total 7.6 6.4 - 8.2 g/dL    Albumin 3.8 3.5 - 5.0 g/dL    Globulin 3.8 2.0 - 4.0 g/dL    A-G Ratio 1.0 (L) 1.1 - 2.2     TROPONIN I    Collection Time: 04/03/20 12:04 PM   Result Value Ref Range    Troponin-I, Qt. <0.05 <0.05 ng/mL   SAMPLES BEING HELD    Collection Time: 04/03/20 12:04 PM   Result Value Ref Range    SAMPLES BEING HELD 1RD,1SST,1BL     COMMENT        Add-on orders for these samples will be processed based on acceptable specimen integrity and analyte stability, which may vary by analyte.    HEMOGLOBIN A1C WITH EAG    Collection Time: 04/03/20 12:04 PM   Result Value Ref Range    Hemoglobin A1c 5.6 4.0 - 5.6 %    Est. average glucose 114 mg/dL   TSH 3RD GENERATION    Collection Time: 04/03/20 12:04 PM   Result Value Ref Range    TSH 0.80 0.36 - 3.74 uIU/mL   T3, FREE    Collection Time: 04/03/20 12:04 PM   Result Value Ref Range    Free Triiodothyronine (T3) 3.4 2.2 - 4.0 pg/mL   T4, FREE    Collection Time: 04/03/20 12:04 PM   Result Value Ref Range    T4, Free 1.4 0.8 - 1.5 NG/DL   URINALYSIS W/ RFLX MICROSCOPIC    Collection Time: 04/03/20 12:36 PM   Result Value Ref Range    Color YELLOW/STRAW      Appearance CLEAR CLEAR      Specific gravity 1.030 1.003 - 1.030      pH (UA) 7.0 5.0 - 8.0      Protein NEGATIVE  NEG mg/dL    Glucose NEGATIVE  NEG mg/dL    Ketone NEGATIVE  NEG mg/dL    Bilirubin NEGATIVE  NEG      Blood TRACE (A) NEG      Urobilinogen 0.2 0.2 - 1.0 EU/dL    Nitrites NEGATIVE  NEG      Leukocyte Esterase NEGATIVE  NEG      WBC 0-4 0 - 4 /hpf    RBC 0-5 0 - 5 /hpf    Epithelial cells FEW FEW /lpf    Bacteria NEGATIVE  NEG /hpf    Hyaline cast 0-2 0 - 5 /lpf   URINE CULTURE HOLD SAMPLE    Collection Time: 04/03/20 12:36 PM   Result Value Ref Range    Urine culture hold        Urine on hold in Microbiology dept for 2 days. If unpreserved urine is submitted, it cannot be used for addtional testing after 24 hours, recollection will be required.    ECHO ADULT COMPLETE    Collection Time: 04/03/20  3:07 PM   Result Value Ref Range    Aortic Valve Systolic Peak Velocity 978.53 cm/s    Aortic Valve Area by Continuity of Peak Velocity 1.9 cm2    AoV PG 6.6 mmHg    LVIDd 4.16 3.9 - 5.3 cm    LVPWd 0.68 0.6 - 0.9 cm    LVIDs 2.99 cm    IVSd 0.82 0.6 - 0.9 cm    LVOT d 2.01 cm    LVOT Peak Velocity 75.14 cm/s    LVOT Peak Gradient 2.3 mmHg    MV A Fadi 91.74 cm/s    MV E Fadi 68.84 cm/s    MV E/A 0.75     Left Atrium to Aortic Root Ratio 0.86     RVIDd 3.01 cm    LA Vol 4C 25.82 22 - 52 mL    LA Vol 2C 36.59 22 - 52 mL    LV Mass .2 67 - 162 g    LV Mass AL Index 64.5 43 - 95 g/m2    RVSP 29.3 mmHg    Est. RA Pressure 3.0 mmHg    Mitral Regurgitant Peak Velocity 284.82 cm/s    Mitral Valve E Wave Deceleration Time 254.2 ms    Left Atrium Major Axis 2.93 cm    Triscuspid Valve Regurgitation Peak Gradient 26.3 mmHg    Pulmonic Valve Max Velocity 68.34 cm/s    TR Max Velocity 256.57 cm/s    PASP 29.3 mmHg    LA Vol Index 23.54 16 - 28 ml/m2    LA Vol Index 16.61 16 - 28 ml/m2    MR Peak Gradient 32.4 mmHg    Left Ventricular Fractional Shortening by 2D [id number] %    PV End Diastolic Velocity 1.0 mmHg    Mitral Valve Deceleration [place] [id number]     AV Velocity Ratio 0.59     Pulmonary Artery Mean Presure 14.3 mmHg    PI End Diastolic Pressure 6.7 mmHg    PV peak gradient 1.9 mmHg    LA Volume 34.49 22 - 52 mL    Ao Root D 3.40 cm    LA Vol Index 22.19 16 - 28 ml/m2

## 2020-04-03 NOTE — ED NOTES
Stroke Education provided to patient and the following topics were discussed    1. Patients personal risk factors for stroke are hypertension and smoking    2. Warning signs of Stroke:        * Sudden numbness or weakness of the face, arm or leg, especially on one side of          The body            * Sudden confusion, trouble speaking or understanding        * Sudden trouble seeing in one or both eyes        * Sudden trouble walking, dizziness, loss of balance or coordination        * Sudden severe headache with no known cause      3. Importance of activation Emergency Medical Services ( 9-1-1 ) immediately if experience any warning signs of stroke. 4. Be sure and schedule a follow-up appointment with your primary care doctor or any specialists as instructed. 5. You must take medicine every day to treat your risk factors for stroke. Be sure to take your medicines exactly as your doctor tells you: no more, no less. Know what your medicines are for , what they do. Anti-thrombotics /anticoagulants can help prevent strokes. You are taking the following medicine(s)  see list     6. Smoking and second-hand smoke greatly increase your risk of stroke, cardiovascular disease and death. Smoking history cigarettes, 1 per day    7. Information provided was [facility] Stroke Education Fatuma Slaughter or Verbal Education    8. Documentation of teaching completed in Patient Education Activity and on Care Plan with teaching response noted?   yes

## 2020-04-03 NOTE — H&P
[id number] [place] [address]  [phone]    Admission History and Physical      NAME:       Shaun Griggs   :       1960   MRN:      [id number]     PCP:      Cash Davis NP     Date of service:   4/3/2020     Chief  Complaint: Slurred speech, right sided weakness    History Of Presenting Illness:       Ms. Roberta Da Silva is a 63 y.o. female who is being admitted for a suspected CVA vs TIA. Ms. Roberta Da Silva presented to our Emergency Department today complaining of slurred speech associated with right sided upper more than lower extremity weakness. Symptoms started 20 at around 2200 hrs. They have been intermittent. She states she felt numb and weak as well as a sensation of tingling. No prior hx seizure or CVA. CT code neuro and CTA head and neck unremarkable for acute changes. She will be observed in hospital for further testing. No Known Allergies    Prior to Admission medications    Medication Sig Start Date End Date Taking? Authorizing Provider   budesonide-formoteroL (Symbicort) 160-4.5 mcg/actuation HFAA Take 2 Puffs by inhalation two (2) times a day. Yes Provider, Historical   levothyroxine (SYNTHROID) 200 mcg tablet Take 200 mcg by mouth Daily (before breakfast). Yes Provider, Historical   lovastatin (MEVACOR) 40 mg tablet Take 40 mg by mouth nightly. Yes Provider, Historical   risedronate (ActoneL) 35 mg tablet Take 35 mg by mouth every seven (7) days. Yes Provider, Historical   albuterol (PROVENTIL HFA, VENTOLIN HFA, PROAIR HFA) 90 mcg/actuation inhaler Take 2 Puffs by inhalation every six (6) hours as needed for Wheezing.    Yes Provider, Historical       Past Medical History:   Diagnosis Date    A-fib [facility])     Chronic obstructive [facility] disease ([facility]     Diabetes ([facility]     Borderline    Heart attack ([facility] [facility]  Hypercholesteremia     Hypertension     [facility]     Thyroid disease         Past Surgical History:   Procedure Laterality Date    CARDIAC SURG PROCEDURE UNLIST      cath    HX APPENDECTOMY      HX CHOLECYSTECTOMY      HX GYN      hysterectomy       Social History     Tobacco Use    Smoking status: Current Every Day Smoker     Packs/day: 1.00    Smokeless tobacco: Never Used   Substance Use Topics    Alcohol use: Never     Frequency: Never        Family History   Problem Relation Age of Onset    Stroke Neg Hx         Review of Systems:    Constitutional ROS: no fever, chills, rigors or night sweats  Respiratory ROS: no cough, sputum, hemoptysis, dyspnea or pleuritic pain. Cardiovascular ROS: no chest pain, palpitations, orthopnea, PND or syncope  Endocrine ROS: no polydispsia, polyuria, heat or cold intolerance or major weight change. Gastrointestinal ROS: no dysphagia, abdominal pain, nausea, vomiting or diarrhea    Genito-Urinary ROS: no dysuria, frequency, hematuria, retention or flank pain  Musculoskeletal ROS: no joint pain, swelling or muscular tenderness  Neurological ROS: no headache but initially slurred speech and RUE weakness   Psychiatric ROS: no depression, anxiety, mood swings  Dermatological ROS: no rash, pruritis, or urticaria  Heme-Lymph ROS: no swollen glands, bleeding    Examination:    Constitutional:    Visit Vitals  /87   Pulse 67   Temp 97.9 °F (36.6 °C)   Resp 13   Ht 5' (1.524 m)   Wt 59.4 kg (130 lb 15.3 oz)   SpO2 96%   BMI 25.58 kg/m²         General:  Weak and ill looking patient in no acute distress  Eyes: Pink conjunctivae, PERRLA with no discharge. Normal eye movements  Ear, Nose, Mouth & Throat: No ottorrhea, rhinorrhea, non tender sinuses, moist mucous membranes  Respiratory:  No accessory muscle use, clear breath sounds without crackles or wheezes  Cardiovascular:  No JVD or murmurs, regular and normal S1, S2 without thrills, bruits or peripheral edema. Capillary refil+, good distal pulses  GI & :  Soft abdomen, non-tender, non-distended, normoactive bowel sounds with no palpable organomegaly  Heme:  No cervical or axillary adenopathy. Musculoskeletal:  No cyanosis, clubbing, atrophy or deformities  Skin:  No rashes, bruising or ulcers   Neurological: Awake and alert, speech is clear, CNs 2-12 are grossly intact and otherwise non focal  Psychiatric:  Has a good insight and is oriented x 3  ________________________________________________________________________    Data Review:    Labs:    Recent Labs     04/03/20  1204   WBC 8.6   HGB 15.5   HCT 46.8        Recent Labs     04/03/20  1204      K 4.4   *   CO2 22   *   BUN 13   CREA 0.74   CA 9.2   ALB 3.8   SGOT 9*   ALT 20     No components found for: GLPOC  No results for input(s): PH, PCO2, PO2, HCO3, FIO2 in the last 72 hours. No results for input(s): INR, INREXT in the last 72 hours. Imaging Studies:      CTA head and neck, CT code neuro - all reviewed     Personally reviewed 12 lead EKG: normal sinus with PVCs. I have also reviewed available old medical records. Assessment & Impression:     Ms. Yesy Claros is a 63 y.o. female being evaluated for:     Active Problems:    Right sided weakness (4/3/2020)      Hypothyroidism (4/3/2020)      Dyslipidemia (4/3/2020)      COPD (chronic obstructive pulmonary disease) ([facility] (4/3/2020)      HTN (hypertension), benign (4/3/2020)         Plan of management:    Right sided weakness (4/3/2020) / Dysphasia POA: symptoms intermittent but overall better. Concerning for CVA. Observe in hospital. CVA work up - MRi brain, Echocardiogram, lipid panel. Start Asprin, Lipitor. Consult neurology, PT, OT. Hypothyroidism (4/3/2020): check TSH and resume Levothyroxine    Dyslipidemia (4/3/2020): check lipid panel and start Liptor for now    COPD (chronic obstructive pulmonary disease) ([facility] (4/3/2020): not wheezing.  Duo Nebs PRN    HTN (hypertension), benign (4/3/2020): BP stable. No home medications. Monitor for now     Addendum 4:50 pm  MRi showed likely embolic CVA. Echo showed a PFO. Consult cardiology. Get venous doppler ultrasound.  Admit to inpatient    Code Status:  Full    Surrogate decision maker: Family    Risk of deterioration: high      Total time spent for the care of the patient: [address] discussed with: Patient, Nursing Staff and ED physician    Discussed:  Code Status, Care Plan and D/C Planning    Prophylaxis:  Lovenox    Probable Disposition:  Home w/Family           ___________________________________________________    Attending Physician: Shirley Garcia MD

## 2020-04-03 NOTE — PROGRESS NOTES
TRANSFER - IN REPORT:    Verbal report received from Ann Virk RN(name) on Anthony Jensen  being received from ED(unit) for routine progression of care      Report consisted of patients Situation, Background, Assessment and   Recommendations(SBAR). Information from the following report(s) SBAR, Kardex, Intake/Output, MAR and Recent Results was reviewed with the receiving nurse. Opportunity for questions and clarification was provided. Assessment completed upon patients arrival to unit and care assumed. 1420 pt currently in MRI per Ann Virk RN    1600 pt arrived to room. VSS. Neuro assessment performed. R arm weak and  unequal R<L. Pt passed STAND. No complaints at this time, presting in bed with bed alarm on with call bell in reach. 5800 tele notified RN that pt had 8 beat run of NSVT, asymptomatic. Dr. Tony He was already aware that pt has frequent PVCs    1825 Duplex being completed at bedside    Bedside and Verbal shift change report given to [address] [place] [address] and [facility] RN (oncoming nurse) by Naina Manning (offgoing nurse). Report included the following information SBAR, Kardex, ED Summary, Intake/Output, MAR, Recent Results and Cardiac Rhythm NSR w/ PVCs.

## 2020-04-03 NOTE — PROGRESS NOTES
Problem: Communication Impaired (Adult)  Goal: *Acute Goals and Plan of Care (Insert Text)  Description: Communication/swallow goals initiated 4-3=-20:  1) tolerate regular diet, thins without s/s aspiration by 4-6-20  2) word-retrieval 90% for moderate level word-retrieval tasks by 4-10-20  3) multi-sentence level answer 90% for syntax and semantics by 4-10-20   Outcome: Progressing Towards Goal   SPEECH LANGUAGE PATHOLOGY EVALUATION  Patient: Cynthia Pimentel (78 y.o. female)  Date: 4/3/2020  Primary Diagnosis: Right sided weakness [R53.1]  Acute CVA (cerebrovascular accident) ([address] [I63.9]        Precautions: aspiration       ASSESSMENT :  Based on the objective data described below, the patient presents with functional swallow and mild expressive aphasia. She had intermittent bursts of inability to talk for a few seconds, then delayed word-retrieval.  .  Admitted 4-3-20 with aphasia, R weakness, dysarthria , jaw pain  MRI: posterior frontal, parietal CVA. Patient will benefit from skilled intervention to address the above impairments. Patients rehabilitation potential is considered to be Good     PLAN :  Recommendations and Planned Interventions:  Ok for regular diet, thin liquids. Would benefit from OP SLP for word-retrieval issues. Needs OT for R UE  ISSUES. Frequency/Duration: Patient will be followed by speech-language pathology 3 times a week to address goals. Discharge Recommendations: Outpatient     SUBJECTIVE:   Patient stated i'M SO frustrated b/c my hand is not texting right.  .    OBJECTIVE:     Past Medical History:   Diagnosis Date    Chronic obstructive pulmonary disease ([address]     Diabetes ([address]     Borderline    Hypercholesteremia     Hypertension     Stroke ([address]     Thyroid disease      Past Surgical History:   Procedure Laterality Date    CARDIAC SURG PROCEDURE UNLIST      cath    HX APPENDECTOMY      HX CHOLECYSTECTOMY      HX GYN      hysterectomy     Prior Level of Function/Home Situation:   Home Situation  Home Environment: Private residence  # Steps to Enter: 2  One/Two Story Residence: One story  Living Alone: No  Support Systems: Family member(s), Friends \ neighbors  Patient Expects to be Discharged to[de-identified] Private residence  Current DME Used/Available at Home: None  Mental Status:  Neurologic State: Alert  Orientation Level: Oriented X4  Cognition: Appropriate decision making  Perception: Appears intact  Perseveration: No perseveration noted  Safety/Judgement: Insight into deficits  Motor Speech:  Oral-Motor Structure/Motor Speech  Labial: No impairment  Dentition: (mostly edentulous)  Patient reports that her jaw pain present on admission has resolved. She stated that she has had a lot of teeth pulled in the last few months to prepare for dentures. Oral Hygiene: WFl  Lingual: No impairment  Velum: No impairment  Mandible: No impairment  Language Comprehension and Expression:  Auditory Comprehension  Auditory Impairment: No   Verbal Expression  Primary Mode of Expression: Verbal   Patient intermittent to occasonally with inability answer ?'s for a few seconds and needed m/c. Otherwise, fluent with no paraphasias noted 80% of  the time. Divergent namin, 3 =moderate to sevre impairment. Patient was able to text her family and read directions. She c/o difficulty with motor planning for texting with her R hand. AUDITORY COMPREHENSION:   Wfl.      Neuro-Linguistics:                                                  Pragmatics:        Voice:      Hoarse/harsh           Vocal Quality: (mildly hoarse/harsh)                                 NOMS: The NOMS functional outcome measure was used to quantify this patient's level of expressive language impairment. Based on the NOMS, the patient was determined to be at level 6 for spoken language expression. NOMS Spoken Language Expression:  Level 1 (CN): Verbalizations not meaningful to anyone.   Level 2 (CM): Few attempts accurate/appropriate. Max cues to elicit automatic/imitative words/phrases. Level 3 (CL): Communication partner responsible for communication; Mod cues for words/phrases meaningful in context  Level 4 (CK): Initiate during simple, routine activities w/familiar partner. Mod cues to produce simple sentences  Level 5 ([address]): Initiates communication with familiar and unfamiliar partners. Min cues for complex sentences. Level 6 (CI): Communicates for most activities. Some limitations still present for vocational/social activities. Rarely cued for complex info  Level 7 [facility]): Independent communication. NYLA. (2003). National Outcomes Measurement System (NOMS): Adult Speech-Language Pathology User's Guide. Pain:  Pain Scale 1: Numeric (0 - 10)  Pain Intensity 1: 0       After treatment:   Patient left in no apparent distress in bed and Nursing notified    COMMUNICATION/EDUCATION:   Patient was educated regarding her deficit(s) of mild expressive aphasia as this relates to her diagnosis of CVA. She demonstrated Good understanding as evidenced by discussion. .    The patient's plan of care including recommendations, planned interventions, and recommended diet changes were discussed with: Registered nurse. Patient/family have participated as able in goal setting and plan of care. Patient/family agree to work toward stated goals and plan of care.     Thank you for this referral.  VISHAL Dozier  Time Calculation: 15 mins

## 2020-04-03 NOTE — ED NOTES
Per EMS, pt is having episodes of aphasia and word finding difficulty  that started about 2200 last night. Since then she has had 4 episodes. C/O of R sided weakness, jaw pain. Per pt when neighbor came to visit this morning, neighbor noticed pt had slurred speech when pt was trying to speak to her granddaughter.

## 2020-04-03 NOTE — PROGRESS NOTES
[facility]: MED RECONCILIATION    Comments/Recommendations:     Unable to leave message or talk with Fuentes Stone NP office [facility] ([phone])  - d/w [facility] pharmacy and obtained some medication information    Paxil 20mg and Seroquel 50mg may be new prescriptions that were sent from the NP's office on 4/3/20, however the patient may have previously taken Paxil since there was a prescription from 2018. Called the pt's room again to clarify information but there was no answer    Medications added:   All- no previous listing of medications      Information obtained from: Patient (alert, oriented and frustrated given current issue), [facility] pharmacy department      Allergies: Patient has no known allergies. Prior to Admission Medications:     Medication Documentation Review Audit       Reviewed by Leah Garnica, PHARMD (Pharmacist) on 04/03/20 at 1404      Medication Sig Documenting Provider Last Dose Status Taking? albuterol (PROVENTIL HFA, VENTOLIN HFA, PROAIR HFA) 90 mcg/actuation inhaler Take 2 Puffs by inhalation every six (6) hours as needed for Wheezing. Provider, Historical  Active Yes   budesonide-formoteroL (Symbicort) 160-4.5 mcg/actuation HFAA Take 2 Puffs by inhalation two (2) times a day. Provider, Historical  Active Yes   levothyroxine (SYNTHROID) 200 mcg tablet Take 200 mcg by mouth Daily (before breakfast). Provider, Historical  Active Yes   lovastatin (MEVACOR) 40 mg tablet Take 40 mg by mouth nightly. Provider, Historical  Active Yes           Med Note (Brain Nielson. Fri Apr 3, 2020  1:47 PM) May be taking metoprolol xl 25mg daily as well but not confirmed yet   risedronate (ActoneL) 35 mg tablet Take 35 mg by mouth every seven (7) days. Provider, Historical  Active Yes                    Kim Yoo.  Juan F De La Torre

## 2020-04-03 NOTE — ED NOTES
TRANSFER - OUT REPORT:    Verbal report given to RN on Maura Ramp  being transferred to 325 for routine progression of care       Report consisted of patients Situation, Background, Assessment and   Recommendations(SBAR). Information from the following report(s) SBAR, ED Summary, [facility] and Recent Results was reviewed with the receiving nurse. Opportunity for questions and clarification was provided.

## 2020-04-03 NOTE — ED PROVIDER NOTES
Pt is a 64 y/o female presenting to ED for episodes of aphasia. Pt states that episodes started last night at aprox 11 pm and resolved. Pt woke up today and had symptoms return. Since waking up she has had waxing/waning of symptoms. This is her 4th episode. She started to have right arm involvement that she describes as right arm weakness, as well as right jaw pain. A neighbor came to the house this morning and noticed that she was having slurred speech when she was trying to speak to her granddaughter. Pt has a hx of a-fib, COPD, diabetes, MI, CVA. Past Medical History:   Diagnosis Date    A-fib [facility])     [facility]     Diabetes ([facility]     Borderline    Heart attack ([facility]     Hypercholesteremia     Hypertension     Stroke ([facility]     Thyroid disease        Past Surgical History:   Procedure Laterality Date    CARDIAC SURG PROCEDURE UNLIST      cath    HX APPENDECTOMY      HX CHOLECYSTECTOMY      HX GYN      hysterectomy         History reviewed. No pertinent family history.     Social History     Socioeconomic History    Marital status: SINGLE     Spouse name: Not on file    Number of children: Not on file    Years of education: Not on file    Highest education level: Not on file   Occupational History    Not on file   Social Needs    Financial resource strain: Not on file    Food insecurity     Worry: Not on file     Inability: Not on file    Transportation needs     Medical: Not on file     Non-medical: Not on file   Tobacco Use    Smoking status: Current Every Day Smoker     Packs/day: 1.00    Smokeless tobacco: Never Used   Substance and Sexual Activity    Alcohol use: Never     Frequency: Never    Drug use: Never    Sexual activity: Not on file   Lifestyle    Physical activity     Days per week: Not on file     Minutes per session: Not on file    Stress: Not on file   Relationships    Social connections     Talks on phone: Not on file Gets together: Not on file     Attends Confucianist service: Not on file     Active member of club or organization: Not on file     Attends meetings of clubs or organizations: Not on file     Relationship status: Not on file    Intimate partner violence     Fear of current or ex partner: Not on file     Emotionally abused: Not on file     Physically abused: Not on file     Forced sexual activity: Not on file   Other Topics Concern    Not on file   Social History Narrative    Not on file         ALLERGIES: Patient has no known allergies. Review of Systems   Neurological: Positive for speech difficulty and weakness. Negative for dizziness and headaches. All other systems reviewed and are negative. Vitals:    04/03/20 1150 04/03/20 1151   BP:  163/85   Pulse:  80   Resp:  16   Temp:  97.9 °F (36.6 °C)   SpO2: 96% 96%   Weight:  59.4 kg (130 lb 15.3 oz)   Height:  5' (1.524 m)            Physical Exam  Vitals signs and nursing note reviewed. Constitutional:       General: She is not in acute distress. Appearance: She is well-developed. She is not diaphoretic. HENT:      Head: Normocephalic and atraumatic. Nose: Nose normal.      Mouth/Throat:      Pharynx: No oropharyngeal exudate. Eyes:      General: No scleral icterus. Right eye: No discharge. Left eye: No discharge. Conjunctiva/sclera: Conjunctivae normal.   Neck:      Musculoskeletal: Normal range of motion and neck supple. Thyroid: No thyromegaly. Vascular: No JVD. Trachea: No tracheal deviation. Cardiovascular:      Rate and Rhythm: Normal rate and regular rhythm. Heart sounds: Normal heart sounds. No murmur. No friction rub. No gallop. Pulmonary:      Effort: Pulmonary effort is normal. No respiratory distress. Breath sounds: Normal breath sounds. No stridor. No wheezing or rales. Chest:      Chest wall: No tenderness.    Abdominal:      General: Bowel sounds are normal. There is no distension. Palpations: There is no mass. Tenderness: There is no abdominal tenderness. There is no rebound. Musculoskeletal: Normal range of motion. General: No tenderness. Lymphadenopathy:      Cervical: No cervical adenopathy. Skin:     General: Skin is warm and dry. Coloration: Skin is not pale. Findings: No erythema or rash. Neurological:      General: No focal deficit present. Mental Status: She is alert and oriented to person, place, and time. Cranial Nerves: No cranial nerve deficit. Sensory: No sensory deficit. Motor: No weakness. Coordination: Coordination normal.   Psychiatric:         Behavior: Behavior normal.         Thought Content: Thought content normal.         Judgment: Judgment normal.          MDM  Number of Diagnoses or Management Options  Aphasia:   Right sided weakness:   Diagnosis management comments: A/P:  TIA vs. CVA. 62 y/o female with waxing/waning of aphasia, slurred speech with right arm weakness. Concern for evolving CVA high on differential.  Pt called as a level 2 Stroke. Stroke order set initiated, tele-neurology consult placed. CT/CTA shows no evidence of LVO or CVA. Pt will require admission for further management.        Amount and/or Complexity of Data Reviewed  Clinical lab tests: ordered and reviewed  Tests in the radiology section of CPT®: ordered and reviewed  Review and summarize past medical records: yes  Discuss the patient with other providers: yes  Independent visualization of images, tracings, or specimens: yes    Risk of Complications, Morbidity, and/or Mortality  Presenting problems: moderate  Diagnostic procedures: moderate  Management options: moderate    Critical Care  Total time providing critical care: 30-74 minutes (Total critical care time spent exclusive of procedures:  45 min.)         Procedures    Hospitalist Perfect Serve for Admission  1:00 PM    ED Room Number: ER20/20  Patient Name and age:  Olga Ordonez 63 y.o.  female  Working Diagnosis:   1. Aphasia    2.  Right sided weakness      COVID-19 Suspicion:  no  Readmission: no  Isolation Requirements:  no  Recommended Level of Care:  telemetry  Code Status:  Full Code  Department:[facility] ED - [phone]  Other:

## 2020-04-03 NOTE — PROGRESS NOTES
4/3/2020  2:29 PM  Case management note    Reason for Admission:   Code stroke vs TIA  Patient came to hospital for weakness, numbness. Patient is currently living with her daughter Cyndi Bryan) in [place] apartments. She does not know the address, or her phone number. Patient appears to be in pain and rocking at bedside. [employer] @ [place] while at daughters home. OBS educated, letter signed and placed in chart. RUR Score:          low           Plan for utilizing home health:      Patient is independent and will most likely not qualify for home health services    PCP: First and Last name:  Kannan Musa   Name of Practice:    Are you a current patient: Yes/No: yes   Approximate date of last visit:                     Current Advanced Directive/Advance Care Plan: no advance directive planning                         Transition of Care Plan:                        1. Home with family assistance  2. PCP follow up  3. AD planning  4. Financial screen  5.  CM to follow until discharge    Care Management Interventions  PCP Verified by CM: Yes(aj coker np)  Transition of Care Consult (CM Consult): Discharge Planning  Current Support Network: [address] for Transition of Care is Related to the Following Treatment Goals : abdominal pain  Discharge Location  Discharge Placement: Home with family assistance   Carolina Rosado

## 2020-04-04 LAB
AMPHET UR QL SCN: NEGATIVE
ATRIAL RATE: 77 BPM
BARBITURATES UR QL SCN: NEGATIVE
BENZODIAZ UR QL: NEGATIVE
CALCULATED P AXIS, ECG09: 60 DEGREES
CALCULATED R AXIS, ECG10: 23 DEGREES
CALCULATED T AXIS, ECG11: 58 DEGREES
CANNABINOIDS UR QL SCN: NEGATIVE
CHOLEST SERPL-MCNC: 209 MG/DL
COCAINE UR QL SCN: NEGATIVE
DIAGNOSIS, 93000: NORMAL
DRUG SCRN COMMENT,DRGCM: NORMAL
FOLATE SERPL-MCNC: 14.5 NG/ML (ref 5–21)
HCYS SERPL-SCNC: 7.7 UMOL/L (ref 3.7–13.9)
HDLC SERPL-MCNC: 48 MG/DL
HDLC SERPL: 4.4 {RATIO} (ref 0–5)
LDLC SERPL CALC-MCNC: 132.8 MG/DL (ref 0–100)
LIPID PROFILE,FLP: ABNORMAL
METHADONE UR QL: NEGATIVE
OPIATES UR QL: NEGATIVE
P-R INTERVAL, ECG05: 118 MS
PCP UR QL: NEGATIVE
Q-T INTERVAL, ECG07: 420 MS
QRS DURATION, ECG06: 86 MS
QTC CALCULATION (BEZET), ECG08: 475 MS
TRIGL SERPL-MCNC: 141 MG/DL (ref ?–150)
VENTRICULAR RATE, ECG03: 77 BPM
VIT B12 SERPL-MCNC: 327 PG/ML (ref 193–986)
VLDLC SERPL CALC-MCNC: 28.2 MG/DL

## 2020-04-04 PROCEDURE — 97162 PT EVAL MOD COMPLEX 30 MIN: CPT

## 2020-04-04 PROCEDURE — 86147 CARDIOLIPIN ANTIBODY EA IG: CPT

## 2020-04-04 PROCEDURE — 65660000000 HC RM CCU STEPDOWN

## 2020-04-04 PROCEDURE — 97112 NEUROMUSCULAR REEDUCATION: CPT | Performed by: OCCUPATIONAL THERAPIST

## 2020-04-04 PROCEDURE — 74011250637 HC RX REV CODE- 250/637: Performed by: NURSE PRACTITIONER

## 2020-04-04 PROCEDURE — 85300 ANTITHROMBIN III ACTIVITY: CPT

## 2020-04-04 PROCEDURE — 80061 LIPID PANEL: CPT

## 2020-04-04 PROCEDURE — 85303 CLOT INHIBIT PROT C ACTIVITY: CPT

## 2020-04-04 PROCEDURE — 85210 CLOT FACTOR II PROTHROM SPEC: CPT

## 2020-04-04 PROCEDURE — 74011250637 HC RX REV CODE- 250/637: Performed by: INTERNAL MEDICINE

## 2020-04-04 PROCEDURE — 74011250636 HC RX REV CODE- 250/636: Performed by: INTERNAL MEDICINE

## 2020-04-04 PROCEDURE — 81241 F5 GENE: CPT

## 2020-04-04 PROCEDURE — 74011000250 HC RX REV CODE- 250: Performed by: INTERNAL MEDICINE

## 2020-04-04 PROCEDURE — 83090 ASSAY OF HOMOCYSTEINE: CPT

## 2020-04-04 PROCEDURE — 36415 COLL VENOUS BLD VENIPUNCTURE: CPT

## 2020-04-04 PROCEDURE — 97165 OT EVAL LOW COMPLEX 30 MIN: CPT | Performed by: OCCUPATIONAL THERAPIST

## 2020-04-04 PROCEDURE — 85306 CLOT INHIBIT PROT S FREE: CPT

## 2020-04-04 PROCEDURE — 94640 AIRWAY INHALATION TREATMENT: CPT

## 2020-04-04 PROCEDURE — 97116 GAIT TRAINING THERAPY: CPT

## 2020-04-04 PROCEDURE — 94664 DEMO&/EVAL PT USE INHALER: CPT

## 2020-04-04 PROCEDURE — 85613 RUSSELL VIPER VENOM DILUTED: CPT

## 2020-04-04 PROCEDURE — 82607 VITAMIN B-12: CPT

## 2020-04-04 PROCEDURE — 94760 N-INVAS EAR/PLS OXIMETRY 1: CPT

## 2020-04-04 RX ORDER — BUDESONIDE 0.5 MG/2ML
500 INHALANT ORAL
Status: DISCONTINUED | OUTPATIENT
Start: 2020-04-04 | End: 2020-04-05 | Stop reason: HOSPADM

## 2020-04-04 RX ORDER — LEVOTHYROXINE SODIUM 100 UG/1
200 TABLET ORAL
Status: DISCONTINUED | OUTPATIENT
Start: 2020-04-04 | End: 2020-04-05 | Stop reason: HOSPADM

## 2020-04-04 RX ORDER — ENOXAPARIN SODIUM 100 MG/ML
40 INJECTION SUBCUTANEOUS EVERY 24 HOURS
Status: DISCONTINUED | OUTPATIENT
Start: 2020-04-05 | End: 2020-04-05 | Stop reason: HOSPADM

## 2020-04-04 RX ORDER — PAROXETINE HYDROCHLORIDE 20 MG/1
20 TABLET, FILM COATED ORAL DAILY
COMMUNITY

## 2020-04-04 RX ORDER — ARFORMOTEROL TARTRATE 15 UG/2ML
15 SOLUTION RESPIRATORY (INHALATION)
Status: DISCONTINUED | OUTPATIENT
Start: 2020-04-04 | End: 2020-04-05 | Stop reason: HOSPADM

## 2020-04-04 RX ADMIN — BUDESONIDE 500 MCG: 0.5 INHALANT RESPIRATORY (INHALATION) at 09:49

## 2020-04-04 RX ADMIN — ENOXAPARIN SODIUM 60 MG: 100 INJECTION SUBCUTANEOUS at 05:13

## 2020-04-04 RX ADMIN — ASPIRIN 81 MG 81 MG: 81 TABLET ORAL at 09:10

## 2020-04-04 RX ADMIN — BUDESONIDE 500 MCG: 0.5 INHALANT RESPIRATORY (INHALATION) at 20:23

## 2020-04-04 RX ADMIN — LEVOTHYROXINE SODIUM 200 MCG: 0.1 TABLET ORAL at 09:10

## 2020-04-04 RX ADMIN — ARFORMOTEROL TARTRATE 15 MCG: 15 SOLUTION RESPIRATORY (INHALATION) at 20:23

## 2020-04-04 RX ADMIN — ACETAMINOPHEN 650 MG: 325 TABLET ORAL at 17:16

## 2020-04-04 RX ADMIN — ARFORMOTEROL TARTRATE 15 MCG: 15 SOLUTION RESPIRATORY (INHALATION) at 09:49

## 2020-04-04 RX ADMIN — ATORVASTATIN CALCIUM 80 MG: 20 TABLET, FILM COATED ORAL at 21:22

## 2020-04-04 RX ADMIN — FAMOTIDINE 20 MG: 20 TABLET, FILM COATED ORAL at 09:10

## 2020-04-04 RX ADMIN — FAMOTIDINE 20 MG: 20 TABLET, FILM COATED ORAL at 21:22

## 2020-04-04 RX ADMIN — SODIUM CHLORIDE 75 ML/HR: 900 INJECTION, SOLUTION INTRAVENOUS at 07:20

## 2020-04-04 NOTE — PROGRESS NOTES
Problem: Mobility Impaired (Adult and Pediatric)  Goal: *Acute Goals and Plan of Care (Insert Text)  Description: FUNCTIONAL STATUS PRIOR TO ADMISSION: Patient was independent and active without use of DME.    HOME SUPPORT PRIOR TO ADMISSION: The patient lived with granddaughter. Physical Therapy Goals  Initiated 4/4/2020  1. Patient will move from supine to sit and sit to supine  in bed with supervision/set-up within 7 day(s). 2.  Patient will transfer from bed to chair and chair to bed with supervision/set-up using the least restrictive device within 7 day(s). 3.  Patient will perform sit to stand with supervision/set-up within 7 day(s). 4.  Patient will ambulate with minimal assistance/contact guard assist for 50 feet with the least restrictive device within 7 day(s). 5.  Patient will ascend/descend 1 stairs with 0 handrail(s) with minimal assistance/contact guard assist within 7 day(s). Outcome: Progressing Towards Goal  Note:   PHYSICAL THERAPY EVALUATION- NEURO POPULATION  Patient: Norma Sorto (82 y.o. female)  Date: 4/4/2020  Primary Diagnosis: Right sided weakness [R53.1]  Acute CVA (cerebrovascular accident) [facility]) [I63.9]        Precautions:   WBAT, Fall      ASSESSMENT  Based on the objective data described below, the patient presents with decreased Right UE and LE strength, balance and coordination deficits and decreased endurance following admission for right sided weakness. Patient now with a confirmed Left frontal/parietal CVA and +PFO. Patient today requires MIN A for all upright activity due to Right LE buckling during stance, decreased trunk stability and poor coordination. Patient would most benefit from rehab at discharge, but she is declining rehab, she state she can have 24 hour supervision with return to home. Patient will require this due to her weakness and balance deficits, recommend RW and home health at discharge. .    Current Level of Function Impacting Discharge (mobility/balance): [address] [facility]  for balance with upright activity    Functional Outcome Measure: The patient scored Total: 8/56 on the [facility] Assessment which is indicative of high fall risk. Other factors to consider for discharge:      Patient will benefit from skilled therapy intervention to address the above noted impairments. PLAN :  Recommendations and Planned Interventions: bed mobility training, transfer training, gait training, therapeutic exercises, neuromuscular re-education, and therapeutic activities      Frequency/Duration: Patient will be followed by physical therapy:  5 times a week to address goals. Recommendation for discharge: (in order for the patient to meet his/her long term goals)  Physical therapy at least 2 days/week in the home AND ensure assist and/or supervision for safety with 24 hour assistance     This discharge recommendation:  A follow-up discussion with the attending provider and/or case management is planned    IF patient discharges home will need the following DME: rolling walker         SUBJECTIVE:   Patient stated i am not going anywhere.     OBJECTIVE DATA SUMMARY:   HISTORY:    Past Medical History:   Diagnosis Date    Chronic obstructive [facility] disease ([facility]     Diabetes ([facility]     Borderline    Hypercholesteremia     Hypertension     Stroke ([facility]     Thyroid disease      Past Surgical History:   Procedure Laterality Date    CARDIAC SURG PROCEDURE UNLIST      cath    HX APPENDECTOMY      HX CHOLECYSTECTOMY      HX GYN      hysterectomy       Personal factors and/or comorbidities impacting plan of care: see above    Home Situation  Home Environment: Private residence  # Steps to Enter: 1  Rails to Enter: No  One/Two Story Residence: One story  Living Alone: No  Support Systems: Family member(s)(granddaughter- other family memebrs can stay with her)  Patient Expects to be Discharged to[de-identified] Private residence  Current DME Used/Available at Home: None  Tub or Shower Type: Tub/Shower combination    EXAMINATION/PRESENTATION/DECISION MAKING:   Critical Behavior:  Neurologic State: Alert, Appropriate for age  Orientation Level: Oriented X4  Cognition: Appropriate decision making, Appropriate for age attention/concentration, Follows commands  Safety/Judgement: Awareness of environment, Fall prevention, Insight into deficits  Hearing: Auditory  Auditory Impairment: None  Skin:  all exposed intact  Edema: none noted  Range Of Motion:  AROM: Generally decreased, functional           PROM: Generally decreased, functional           Strength:    Strength: Generally decreased, functional(RUE 3/5 and LUE 5/5)                    Tone & Sensation:   Tone: Normal              Sensation: Intact(to light touch)               Coordination:  Coordination: Generally decreased, functional(imp RUE)  Vision:   Acuity: Within Defined Limits  Corrective Lenses: Glasses  Functional Mobility:  Bed Mobility:  Rolling: Contact guard assistance  Supine to Sit: Contact guard assistance  Sit to Supine: Stand-by assistance  Scooting: Stand-by assistance  Transfers:  Sit to Stand: Minimum assistance(with buckling)  Stand to Sit: Minimum assistance        Bed to Chair: Minimum assistance              Balance:   Sitting: Intact  Ambulation/Gait Training:  Distance (ft): 25 Feet (ft)  Assistive Device: Gait belt  Ambulation - Level of Assistance: Minimal assistance        Gait Abnormalities: Path deviations(knee buckling)                                       Stairs:               Therapeutic Exercises:       Functional Measure  Woody Balance Test:    Sitting to Standin  Standing Unsupported: 1  Sitting with Back Unsupported: 4  Standing to Sittin  Transfers: 1  Standing Unsupported with Eyes Closed: 0  Standing Unsupported with Feet Together: 0  Reach Forward with Outstretched Arm: 0   Object: 0  Turn to Look Over Shoulders: 0  Turn 360 Degrees: 0  Alternate Foot on Step/Stool: 0  Standing Unsupported One Foot in Front: 0  Stand on One Le  Total: 8/56         56=Maximum possible score;   0-20=High fall risk  21-40=Moderate fall risk   41-56=Low fall risk        Physical Therapy Evaluation Charge Determination   History Examination Presentation Decision-Making   HIGH Complexity :3+ comorbidities / personal factors will impact the outcome/ POC  HIGH Complexity : 4+ Standardized tests and measures addressing body structure, function, activity limitation and / or participation in recreation  MEDIUM Complexity : Evolving with changing characteristics  Other outcome measures CHOWDARY  HIGH       Based on the above components, the patient evaluation is determined to be of the following complexity level: MEDIUM    Pain Rating:  None reported    Activity Tolerance:   Good and Fair  Please refer to the flowsheet for vital signs taken during this treatment. After treatment patient left in no apparent distress:   Sitting in chair, Call bell within reach, and Bed / chair alarm activated    COMMUNICATION/EDUCATION:   The patients plan of care was discussed with: Occupational therapist and Registered nurse. Patient was educated regarding her deficit(s) of balance, strength and coordination as this relates to her diagnosis of CVA. She demonstrated Good understanding as evidenced by verbal feedback. Patient and/or family was verbally educated on the BE FAST acronym for signs/symptoms of CVA and TIA. Informed patient to refer to the Stroke Binder for further BE FAST information. All questions answered with patient indicating good understanding. Fall prevention education was provided and the patient/caregiver indicated understanding., Patient/family have participated as able in goal setting and plan of care. , and Patient/family agree to work toward stated goals and plan of care.     Thank you for this referral.  Dawood Quiñonez, PT, DPT   Time Calculation: 22 mins

## 2020-04-04 NOTE — ACP (ADVANCE CARE PLANNING)
received an in-basket request to assist Mrs. Freedom Henley with an Advance Medical Directive (AMD) in the Post Surgical Ortho unit. Mrs. Freedom Henley was awake, alert, and sitting up in her recliner when the  came into the room.  introduced herself and confirmed the AMD request. She appeared to be a little hesitant at first, but then agreed to receive more information.  explained the AMD form to Mrs. Freedom Henley and answered questions as needed. Mrs. Freedom Henley requested to review the form later and think about whom she would like to be her health care agents.  left the AMD form, the Your Right To Decide brochure and the Absence of AMD flow chart with Mrs. Freedom Henley. She thanked the  for the information and expressed no additional needs at this time. Advised of 's availability.  will follow up as able and/or needed  Naga Kang. Micky Barragan.      Paging Service: [phone])

## 2020-04-04 NOTE — PROGRESS NOTES
Problem: Falls - Risk of  Goal: *Absence of Falls  Description: Document Ree Hicks Fall Risk and appropriate interventions in the flowsheet. Outcome: Progressing Towards Goal  Note: Fall Risk Interventions:  Mobility Interventions: Patient to call before getting OOB, Bed/chair exit alarm, Strengthening exercises (ROM-active/passive)         Medication Interventions: Assess postural VS orthostatic hypotension, Bed/chair exit alarm, Patient to call before getting OOB, Teach patient to arise slowly    Elimination Interventions: Bed/chair exit alarm, Call light in reach, Patient to call for help with toileting needs           Problem: Patient Education: Go to Patient Education Activity  Goal: Patient/Family Education  Outcome: Progressing Towards Goal     Problem: Pressure Injury - Risk of  Goal: *Prevention of pressure injury  Description: Document Jd Scale and appropriate interventions in the flowsheet. Outcome: Progressing Towards Goal  Note: Pressure Injury Interventions:  Sensory Interventions: Assess changes in LOC, Keep linens dry and wrinkle-free, Turn and reposition approx. every two hours (pillows and wedges if needed)    Moisture Interventions: Absorbent underpads, Check for incontinence Q2 hours and as needed, Internal/External urinary devices    Activity Interventions: Increase time out of bed, PT/OT evaluation    Mobility Interventions: HOB 30 degrees or less, Pressure redistribution bed/mattress (bed type), Turn and reposition approx.  every two hours(pillow and wedges)    Nutrition Interventions: Document food/fluid/supplement intake, Offer support with meals,snacks and hydration            Problem: Patient Education: Go to Patient Education Activity  Goal: Patient/Family Education  Outcome: Progressing Towards Goal     Problem: Patient Education: Go to Patient Education Activity  Goal: Patient/Family Education  Outcome: Progressing Towards Goal     Problem: Patient Education: Go to Patient Education Activity  Goal: Patient/Family Education  Outcome: Progressing Towards Goal       0725: Bedside and Verbal shift change report given to [address] (oncoming nurse) by Ale Shaw (offgoing nurse). Report included the following information SBAR, Kardex and Recent Results.

## 2020-04-04 NOTE — PROGRESS NOTES
Problem: Falls - Risk of  Goal: *Absence of Falls  Description: Document Starleen Freeze Fall Risk and appropriate interventions in the flowsheet.   Outcome: Progressing Towards Goal  Note: Fall Risk Interventions:  Mobility Interventions: Bed/chair exit alarm         Medication Interventions: Patient to call before getting OOB    Elimination Interventions: Bed/chair exit alarm

## 2020-04-04 NOTE — PROGRESS NOTES
Admission Medication Reconciliation:     Information obtained from:    Patient via phone call to room 325. RxQuery data available¹:  YES    Comments/Recommendations:   Please see the original medication reconciliation note filed by another pharmacist 4/3/20 at 14:09. The other pharmacist called [address] on 4/3/20 to verify/create the list below. This pharmacist called and spoke with the patient to verify the list on 4/4/20. The patient denies taking metoprolol succinate. She reports she took it at one time but has not taken it in many months. She is unsure why the metoprolol was stopped. The patient was prescribed seroquel 50 mg on 4/2/20 to help with sleep but the patient has not obtained this medication from the pharmacy and thus not started it prior to admission. Updated PTA medication list  Verified preferred pharmacy  Reviewed patient's allergies     ¹RxQuery pharmacy benefit data reflects medications filled and processed through the patient's insurance, however   this data does NOT capture whether the medication was picked up or is currently being taken by the patient. Prior to Admission Medications   Prescriptions Last Dose Informant Taking? PARoxetine (PaxiL) 20 mg tablet  Self Yes   Sig: Take 20 mg by mouth daily. albuterol (PROVENTIL HFA, VENTOLIN HFA, PROAIR HFA) 90 mcg/actuation inhaler 4/3/2020 at Unknown time Self Yes   Sig: Take 2 Puffs by inhalation every six (6) hours as needed for Wheezing. budesonide-formoteroL (Symbicort) 160-4.5 mcg/actuation HFAA 4/3/2020 at Unknown time Self Yes   Sig: Take 2 Puffs by inhalation two (2) times a day. levothyroxine (SYNTHROID) 200 mcg tablet 4/2/2020 at Unknown time Self Yes   Sig: Take 200 mcg by mouth Daily (before breakfast). lovastatin (MEVACOR) 40 mg tablet 4/2/2020 at Unknown time Self Yes   Sig: Take 40 mg by mouth nightly.    risedronate (ActoneL) 35 mg tablet 3/27/2020 at Unknown time Self Yes   Sig: Take 35 mg by mouth every seven (7) days. Facility-Administered Medications: None         Please contact the main inpatient pharmacy with any questions or concerns at [phone] and we will direct you to the clinical pharmacist covering this patient's care while in-house.    Sangeetha Nayak, BeulahD, BCPS

## 2020-04-04 NOTE — PROGRESS NOTES
Spiritual Care Assessment/Progress Note  [address]      NAME: Buffy Beebe      MRN: [id number]  AGE: 63 y.o.  SEX: female  Adventist Affiliation: No Yazidi   Language: English     4/4/2020     Total Time (in minutes): 25     Spiritual Assessment begun in [facility] [address] through conversation with:         [x]Patient        [] Family    [] Friend(s)        Reason for Consult: Advance medical directive consult     Spiritual beliefs: (Please include comment if needed)     [] Identifies with a lex tradition:         [] Supported by a lex community:            [] Claims no spiritual orientation:           [] Seeking spiritual identity:                [] Adheres to an individual form of spirituality:           [x] Not able to assess:                           Identified resources for coping:      [] Prayer                               [] Music                  [] Guided Imagery     [x] Family/friends                 [] Pet visits     [] Devotional reading                         [] Unknown     [] Other:                                              Interventions offered during this visit: (See comments for more details)    Patient Interventions: Advance medical directive consult, Affirmation of emotions/emotional suffering, Catharsis/review of pertinent events in supportive environment           Plan of Care:     [] Support spiritual and/or cultural needs    [x] Support AMD and/or advance care planning process      [] Support grieving process   [] Coordinate Rites and/or Rituals    [] Coordination with community clergy   [] No spiritual needs identified at this time   [] Detailed Plan of Care below (See Comments)  [] Make referral to Music Therapy  [] Make referral to Pet Therapy     [] Make referral to Addiction services  [] Make referral to [facility]  [] Make referral to Spiritual Care Partner  [] No future visits requested        [x] Follow up visits as needed     Comments:   received an in-basket request to assist Mrs. Hans Rodarte with an Advance Medical Directive (AMD) in the Post Surgical Ortho unit. Mrs. Hans Rodarte was awake, alert, and sitting up in her recliner when the  came into the room.  introduced herself and confirmed the AMD request. She appeared to be a little hesitant at first, but then agreed to receive more information.  explained the AMD form to Mrs. Hans Rodarte and answered questions as needed. As  was going through the document,  observed that Mrs. Hans Rodarte became very emotional. She reflected on her recent experience with a code stroke and shared that thinking about her own mortality was very difficult.  affirmed her emotions and continued to provide a supportive presence as Mrs. Hans Rodarte processed other emotions and concerns at this time. Mrs. Hans Rodarte requested to review the form later and think about whom she would like to be her health care agents.  left the AMD form, the Your Right To Decide brochure and the Absence of AMD flow chart with Mrs. Hans Rodarte. She thanked the  for the information and expressed no additional needs at this time. Advised of 's availability.  will follow up as able and/or needed  [employer]. Mary Omalley.      Paging Service: [phone])

## 2020-04-04 NOTE — PROGRESS NOTES
Problem: Falls - Risk of  Goal: *Absence of Falls  Description: Document Starleen Freeze Fall Risk and appropriate interventions in the flowsheet. Outcome: Progressing Towards Goal  Note: Fall Risk Interventions:  Mobility Interventions: Patient to call before getting OOB, Bed/chair exit alarm, Strengthening exercises (ROM-active/passive)         Medication Interventions: Assess postural VS orthostatic hypotension, Bed/chair exit alarm, Patient to call before getting OOB, Teach patient to arise slowly    Elimination Interventions: Bed/chair exit alarm, Call light in reach, Patient to call for help with toileting needs              Problem: Patient Education: Go to Patient Education Activity  Goal: Patient/Family Education  Outcome: Progressing Towards Goal     Problem: Pressure Injury - Risk of  Goal: *Prevention of pressure injury  Description: Document Jd Scale and appropriate interventions in the flowsheet. Outcome: Progressing Towards Goal  Note: Pressure Injury Interventions:  Sensory Interventions: Assess changes in LOC, Keep linens dry and wrinkle-free, Turn and reposition approx. every two hours (pillows and wedges if needed)    Moisture Interventions: Absorbent underpads, Check for incontinence Q2 hours and as needed, Internal/External urinary devices    Activity Interventions: Increase time out of bed, PT/OT evaluation    Mobility Interventions: HOB 30 degrees or less, Pressure redistribution bed/mattress (bed type), Turn and reposition approx.  every two hours(pillow and wedges)    Nutrition Interventions: Document food/fluid/supplement intake, Offer support with meals,snacks and hydration                     Problem: Patient Education: Go to Patient Education Activity  Goal: Patient/Family Education  Outcome: Progressing Towards Goal     Problem: Patient Education: Go to Patient Education Activity  Goal: Patient/Family Education  Outcome: Progressing Towards Goal     Problem: Patient Education: Go to Patient Education Activity  Goal: Patient/Family Education  Outcome: Progressing Towards Goal     Problem: Patient Education: Go to Patient Education Activity  Goal: Patient/Family Education  Outcome: Progressing Towards Goal     Problem: TIA/CVA Stroke: 0-24 hours  Goal: Off Pathway (Use only if patient is Off Pathway)  Outcome: Progressing Towards Goal  Goal: Activity/Safety  Outcome: Progressing Towards Goal  Goal: Consults, if ordered  Outcome: Progressing Towards Goal  Goal: Diagnostic Test/Procedures  Outcome: Progressing Towards Goal  Goal: Nutrition/Diet  Outcome: Progressing Towards Goal  Goal: Discharge Planning  Outcome: Progressing Towards Goal  Goal: Medications  Outcome: Progressing Towards Goal  Goal: Respiratory  Outcome: Progressing Towards Goal  Goal: Treatments/Interventions/Procedures  Outcome: Progressing Towards Goal  Goal: Minimize risk of bleeding post-thrombolytic infusion  Outcome: Progressing Towards Goal  Goal: Monitor for complications post-thrombolytic infusion  Outcome: Progressing Towards Goal  Goal: Psychosocial  Outcome: Progressing Towards Goal  Goal: *Hemodynamically stable  Outcome: Progressing Towards Goal  Goal: *Neurologically stable  Description: Absence of additional neurological deficits    Outcome: Progressing Towards Goal  Goal: *Verbalizes anxiety and depression are reduced or absent  Outcome: Progressing Towards Goal  Goal: *Absence of Signs of Aspiration on Current Diet  Outcome: Progressing Towards Goal  Goal: *Absence of deep venous thrombosis signs and symptoms(Stroke Metric)  Outcome: Progressing Towards Goal  Goal: *Ability to perform ADLs and demonstrates progressive mobility and function  Outcome: Progressing Towards Goal  Goal: *Stroke education started(Stroke Metric)  Outcome: Progressing Towards Goal  Goal: *Dysphagia screen performed(Stroke Metric)  Outcome: Progressing Towards Goal  Goal: *Rehab consulted(Stroke Metric)  Outcome: Progressing Towards Goal

## 2020-04-04 NOTE — PROGRESS NOTES
Shift Change:     Bedside and Verbal shift change report given to Remington Mullins and Anthony Hester (oncoming nurse) by Moises Osborn (offgoing nurse). Report included the following information SBAR, Kardex and Recent Results. Shift Summary:    8207: No acute changes overnight    Shift Change:     Bedside and Verbal shift change report given to Nathalia (oncoming nurse) by Daljit Álvarez (offgoing nurse). Report included the following information SBAR, Kardex and Recent Results.

## 2020-04-04 NOTE — PROGRESS NOTES
Cardiology Progress Note      [facility]   NAME:  Rosibel Velarde   :   1960   MRN:   [id number]     Assessment/Plan:   1. Acute L frontoparietal CVA: ? cardioembolic in eitology. Venous duplex with no DVT . On asa, high dose statin. Awaiting neurology input. Will arrange for OP event monitor to exclude AF  2. HTN:   3. Smoker: encouraged cessation. 4. COPD:   5. Lg PFO on ECHO: Consider PFO closure if event monitor negative. [address] Nafisa Deleon for d/c cardiac wise. Office will arrange virtual follow up. Subjective:   Rosibel Velarde is a 63 y.o. female admitted for Right sided weakness [R53.1]; Acute CVA (cerebrovascular accident) ([facility] [address] [I63.9]. Consult requested by Jb Daley MD for evaluation of PFO  Admitted with left hemispheric stroke with dysarthria and right arm weakness. No lytic given given delayed presentation. She reports somewhat stuttering course. MRI today with left frontoparietal infarction suggestive of embolic etiology. Head/neck CTA with nonobstructive disease. Tele - frequent PVCs, no AF. Echo with significant PFP with right to left shunt, more prominent post Valsalva. Sx improving.          Cardiac ROS: Patient denies any exertional chest pain, dyspnea, palpitations, syncope, orthopnea, edema or paroxysmal nocturnal dyspnea. Previous Cardiac Eval  20   ECHO ADULT COMPLETE 2020 4/3/2020    Narrative · Saline contrast was given to evaluate for intracardiac shunt. · Normal cavity size and wall thickness. Low normal systolic function. Estimated left ventricular ejection fraction is 50 - 55%. Visually   measured ejection fraction. No regional wall motion abnormality noted. · Agitated saline contrast study was performed and color flow Doppler was   used. detected by saline contrast suggestive of a patent foramen ovale.    There was a severe and right to left shunt, with Valsalva maneuver   confirmed by saline contrast suggestive of a patent foramen beatriz.        Signed by: Osmany Anderson MD         Review of Systems: Still with some R hand weakness. No nausea, indigestion, vomiting, pain, cough, sputum. No bleeding. Taking po. Objective:     Visit Vitals  /83 (BP 1 Location: Left arm, BP Patient Position: At rest)   Pulse (!) 52   Temp 99.5 °F (37.5 °C)   Resp 16   Ht 5' (1.524 m)   Wt 130 lb (59 kg)   SpO2 93%   BMI 25.39 kg/m²      O2 Device: Room air    Temp (24hrs), Av.5 °F (36.9 °C), Min:97.7 °F (36.5 °C), Max:99.5 °F (37.5 °C)      No intake/output data recorded.  1901 -  0700  In: 896 [P.O.:120; I.V.:776]  Out: -      TELE: SR PVC's     General: AAOx3 cooperative, no acute distress. HEENT: Atraumatic. Pink and moist.  Anicteric sclerae. Neck : Supple. Lungs: CTA bilaterally. No wheezing/rhonchi/rales. Heart: Regular rhythm, no murmur, no rubs, no gallops. No JVD. No carotid bruits. Abdomen: Soft, non-distended, non-tender. + Bowel sounds. Extremities: No edema. Neurologic: Grossly intact. Alert and oriented X 3. No acute neurological distress. Psych: Good insight. Not anxious or agitated. Care Plan discussed with:    Comments   Patient x    Family      RN x    Care Manager                    Consultant:  x        Data Review:     No lab exists for component: ITNL   Recent Labs     20  1204   TROIQ <0.05     Recent Labs     20  1204      K 4.4   *   CO2 22   BUN 13   CREA 0.74   *   ALB 3.8   WBC 8.6   HGB 15.5   HCT 46.8        No results for input(s): INR, PTP, APTT, INREXT in the last 72 hours.     Medications reviewed  Current Facility-Administered Medications   Medication Dose Route Frequency    0.9% sodium chloride infusion  75 mL/hr IntraVENous CONTINUOUS    ondansetron (ZOFRAN) injection 2 mg  2 mg IntraVENous Q6H PRN    atorvastatin (LIPITOR) tablet 80 mg  80 mg Oral QHS    labetaloL (NORMODYNE;TRANDATE) 20 mg/4 mL (5 mg/mL) injection 20 mg  20 mg IntraVENous Q10MIN PRN    famotidine (PEPCID) tablet 20 mg  20 mg Oral Q12H    acetaminophen (TYLENOL) tablet 650 mg  650 mg Oral Q4H PRN    Or    acetaminophen (TYLENOL) solution 650 mg  650 mg Per NG tube Q4H PRN    Or    acetaminophen (TYLENOL) suppository 650 mg  650 mg Rectal Q4H PRN    albuterol-ipratropium (DUO-NEB) 2.5 MG-0.5 MG/3 ML  3 mL Nebulization Q6H PRN    aspirin chewable tablet 81 mg  81 mg Oral DAILY    enoxaparin (LOVENOX) injection 60 mg  1 mg/kg SubCUTAneous Q12H         Albetro Franco NP

## 2020-04-04 NOTE — PROGRESS NOTES
Bedside shift change report given to JAZZ Coombs (oncoming nurse) by Flakito Reyes RN (offgoing nurse). Report included the following information SBAR and Kardex.

## 2020-04-04 NOTE — PROGRESS NOTES
[facility]  [address] [facility], [address] [place] [address]  [phone]      Medical Progress Note      NAME:         Maryam Reyes   :        1960  MRM:        [id number]    Date of service: 2020      Subjective: Patient has been seen and examined as a follow up for multiple medical issues. Chart, labs, diagnostics reviewed. Still with RUE weakness although better. No residual speech deficits. She denies any headaches, dizziness or palptations     Objective:    Vital Signs:    Visit Vitals  /83 (BP 1 Location: Left arm, BP Patient Position: At rest)   Pulse (!) 52   Temp 99.5 °F (37.5 °C)   Resp 16   Ht 5' (1.524 m)   Wt 59 kg (130 lb)   SpO2 93%   BMI 25.39 kg/m²          Intake/Output Summary (Last 24 hours) at 2020 0845  Last data filed at 2020 0528  Gross per 24 hour   Intake 896 ml   Output    Net 896 ml        Physical Examination:    General:   Weak looking but not in any acute distress   Eyes:   pink conjunctivae, PERRLA with no discharge. ENT:   no ottorrhea or rhinorrhea with dry mucous membranes  Neck: no masses, thyroid non-tender and trachea central.  Pulm:  no accessory muscle use, clear breath sounds without crackles. + wheezes  Card:  no JVD or murmurs, has regular and normal S1, S2 without thrills, bruits or peripheral edema  Abd:  Soft, non-tender, non-distended, normoactive bowel sounds with no palpable organomegaly  Musc:  No cyanosis, clubbing, atrophy or deformities. Skin:  No rashes, bruising or ulcers. Neuro: Awake and alert. Speech clear. No facial droop. Has RUE weakness 3/5 power. Rest of exam unremarkable.    Psych:  Has a good insight and is oriented x 3    Current Facility-Administered Medications   Medication Dose Route Frequency    0.9% sodium chloride infusion  75 mL/hr IntraVENous CONTINUOUS    ondansetron (ZOFRAN) injection 2 mg  2 mg IntraVENous Q6H PRN    atorvastatin (LIPITOR) tablet 80 mg  80 mg Oral QHS    labetaloL (NORMODYNE;TRANDATE) 20 mg/4 mL (5 mg/mL) injection 20 mg  20 mg IntraVENous Q10MIN PRN    famotidine (PEPCID) tablet 20 mg  20 mg Oral Q12H    acetaminophen (TYLENOL) tablet 650 mg  650 mg Oral Q4H PRN    Or    acetaminophen (TYLENOL) solution 650 mg  650 mg Per NG tube Q4H PRN    Or    acetaminophen (TYLENOL) suppository 650 mg  650 mg Rectal Q4H PRN    albuterol-ipratropium (DUO-NEB) 2.5 MG-0.5 MG/3 ML  3 mL Nebulization Q6H PRN    aspirin chewable tablet 81 mg  81 mg Oral DAILY    enoxaparin (LOVENOX) injection 60 mg  1 mg/kg SubCUTAneous Q12H        Laboratory data and review:    Recent Labs     04/03/20  1204   WBC 8.6   HGB 15.5   HCT 46.8        Recent Labs     04/03/20  1204      K 4.4   *   CO2 22   *   BUN 13   CREA 0.74   CA 9.2   ALB 3.8   SGOT 9*   ALT 20     No components found for: [place]    Diagnostics:    MRi brain - multifocal small acute left posterior frontal/parietal cortical infarcts in pattern most suggestive of emboli. Multifocal areas of T2 hyperintensity in cerebral white matter are nonspecific and possibly related to chronic small vessel ischemia.     CTA head and neck - No evidence of significant stenosis or aneurysm.     CTA Neck - No evidence of flow-limiting stenosis. Dense calcific atherosclerosis of the left carotid bifurcation with approximately 60% stenosis of the distal left common carotid artery, and less than 50% stenosis of the proximal left internal carotid artery. Dense calcific atherosclerosis of the right carotid bifurcation with less than 50% stenosis of the proximal right internal carotid artery.     Echocardiogram - saline contrast was given to evaluate for intracardiac shunt. Normal cavity size and wall thickness. Low normal systolic function. Estimated left ventricular ejection fraction is 50 - 55%. Visually measured ejection fraction.  No regional wall motion abnormality noted. Agitated saline contrast study was performed and color flow Doppler was used. detected by saline contrast suggestive of a patent foramen ovale. There was a severe and right to left shunt, with Valsalva maneuver confirmed by saline contrast suggestive of a patent foramen ovale. Venous doppler lower extremities - neg for DVT    Telemetry reviewed by me: sinus with PVCs     Assessment and Plan:    Acute CVA (cerebrovascular accident) ([facility] [address] (4/3/2020) / Right sided weakness (4/3/2020): has multifocal left sided infarcts. Non obstructive carotids but a large PFO. Neg venous dopplers. Lipid panel pending. Continue Asprin. Lipitor. ?need for Plavix. PT, OT. Neurology consult pending. May need an event recorder to assess for occult A fib. PFO management to be guided by cardiology at follow up     [facility] (4/3/2020): slight wheeze. Duonebs. Add pulmicort and brovana    Hypothyroidism (4/3/2020): TSH is normal. Continue levothyroxine    Dyslipidemia (4/3/2020): lipid panel pending. Continue lipitor    HTN (hypertension), benign (4/3/2020): BP well controlled. No medications.  Monitor    PFO (patent foramen ovale) (4/3/2020): work up and follow up as above    Total time spent for the patient's care: [address] [place] discussed with: Patient and Nursing Staff    Discussed:  Care Plan and D/C Planning    Prophylaxis:  Lovenox    Anticipated Disposition:   PT, OT, RN vs other           ___________________________________________________    Attending Physician:   Ann Smith MD

## 2020-04-05 VITALS
TEMPERATURE: 98.6 F | DIASTOLIC BLOOD PRESSURE: 63 MMHG | RESPIRATION RATE: 18 BRPM | OXYGEN SATURATION: 97 % | HEART RATE: 70 BPM | SYSTOLIC BLOOD PRESSURE: 107 MMHG | WEIGHT: 130 LBS | HEIGHT: 60 IN | BODY MASS INDEX: 25.52 KG/M2

## 2020-04-05 PROBLEM — R29.898 WEAKNESS OF RIGHT UPPER EXTREMITY: Status: ACTIVE | Noted: 2020-04-05

## 2020-04-05 LAB
CHOLEST SERPL-MCNC: 186 MG/DL
HDLC SERPL-MCNC: 43 MG/DL
HDLC SERPL: 4.3 {RATIO} (ref 0–5)
LDLC SERPL CALC-MCNC: 117.8 MG/DL (ref 0–100)
LIPID PROFILE,FLP: ABNORMAL
TRIGL SERPL-MCNC: 126 MG/DL (ref ?–150)
VLDLC SERPL CALC-MCNC: 25.2 MG/DL

## 2020-04-05 PROCEDURE — 36415 COLL VENOUS BLD VENIPUNCTURE: CPT

## 2020-04-05 PROCEDURE — 74011250636 HC RX REV CODE- 250/636: Performed by: INTERNAL MEDICINE

## 2020-04-05 PROCEDURE — 80061 LIPID PANEL: CPT

## 2020-04-05 PROCEDURE — 74011250637 HC RX REV CODE- 250/637: Performed by: NURSE PRACTITIONER

## 2020-04-05 PROCEDURE — 94760 N-INVAS EAR/PLS OXIMETRY 1: CPT

## 2020-04-05 PROCEDURE — 74011000250 HC RX REV CODE- 250: Performed by: INTERNAL MEDICINE

## 2020-04-05 PROCEDURE — 94640 AIRWAY INHALATION TREATMENT: CPT

## 2020-04-05 PROCEDURE — 74011250637 HC RX REV CODE- 250/637: Performed by: INTERNAL MEDICINE

## 2020-04-05 RX ORDER — GUAIFENESIN 100 MG/5ML
81 LIQUID (ML) ORAL DAILY
Qty: 30 TAB | Refills: 0 | Status: SHIPPED | OUTPATIENT
Start: 2020-04-05 | End: 2020-05-05

## 2020-04-05 RX ORDER — ATORVASTATIN CALCIUM 80 MG/1
80 TABLET, FILM COATED ORAL
Qty: 30 TAB | Refills: 1 | Status: SHIPPED | OUTPATIENT
Start: 2020-04-05 | End: 2020-05-05

## 2020-04-05 RX ORDER — ATORVASTATIN CALCIUM 80 MG/1
80 TABLET, FILM COATED ORAL
Qty: 30 TAB | Refills: 1 | Status: SHIPPED | OUTPATIENT
Start: 2020-04-05 | End: 2020-04-05

## 2020-04-05 RX ADMIN — ARFORMOTEROL TARTRATE 15 MCG: 15 SOLUTION RESPIRATORY (INHALATION) at 07:01

## 2020-04-05 RX ADMIN — ASPIRIN 81 MG 81 MG: 81 TABLET ORAL at 08:14

## 2020-04-05 RX ADMIN — BUDESONIDE 500 MCG: 0.5 INHALANT RESPIRATORY (INHALATION) at 07:01

## 2020-04-05 RX ADMIN — LEVOTHYROXINE SODIUM 200 MCG: 0.1 TABLET ORAL at 06:10

## 2020-04-05 RX ADMIN — FAMOTIDINE 20 MG: 20 TABLET, FILM COATED ORAL at 08:14

## 2020-04-05 RX ADMIN — ENOXAPARIN SODIUM 40 MG: 40 INJECTION SUBCUTANEOUS at 08:14

## 2020-04-05 NOTE — PROGRESS NOTES
Neurology Progress Note    Patient ID:  Cory Zhang  [id number]  63 y.o.  1960    CC: cognitive issue, slurred speech and right arm weakness    Subjective:      Patient reports no recurrence of her cognitive issue or slurred speech. Improving right arm strength. LDL was elevated at 117.8. BP on the low side. Current Facility-Administered Medications   Medication Dose Route Frequency    levothyroxine (SYNTHROID) tablet 200 mcg  200 mcg Oral ACB    enoxaparin (LOVENOX) injection 40 mg  40 mg SubCUTAneous Q24H    budesonide (PULMICORT) 500 mcg/2 ml nebulizer suspension  500 mcg Nebulization BID RT    arformoteroL (BROVANA) neb solution 15 mcg  15 mcg Nebulization BID RT    ondansetron (ZOFRAN) injection 2 mg  2 mg IntraVENous Q6H PRN    atorvastatin (LIPITOR) tablet 80 mg  80 mg Oral QHS    labetaloL (NORMODYNE;TRANDATE) 20 mg/4 mL (5 mg/mL) injection 20 mg  20 mg IntraVENous Q10MIN PRN    famotidine (PEPCID) tablet 20 mg  20 mg Oral Q12H    acetaminophen (TYLENOL) tablet 650 mg  650 mg Oral Q4H PRN    Or    acetaminophen (TYLENOL) solution 650 mg  650 mg Per NG tube Q4H PRN    Or    acetaminophen (TYLENOL) suppository 650 mg  650 mg Rectal Q4H PRN    albuterol-ipratropium (DUO-NEB) 2.5 MG-0.5 MG/3 ML  3 mL Nebulization Q6H PRN    aspirin chewable tablet 81 mg  81 mg Oral DAILY        Review of Systems:    Pertinent items are noted in HPI. Objective:     Patient Vitals for the past 8 hrs:   BP Temp Pulse Resp SpO2   04/05/20 0803 99/72 99 °F (37.2 °C) 86 18 92 %   04/05/20 0701     94 %   04/05/20 0658   81         No intake/output data recorded. 04/03 1901 - 04/05 0700  In: 1094 [P.O.:318;  I.V.:776]  Out: 1200 [Urine:1200]    Lab Review   Recent Results (from the past 24 hour(s))   HOMOCYSTEINE, PLASMA    Collection Time: 04/04/20 12:00 PM   Result Value Ref Range    Homocysteine, plasma 7.7 3.7 - 13.9 umol/L   VITAMIN B12 & FOLATE    Collection Time: 04/04/20 12:29 PM   Result Value Ref Range    Vitamin B12 327 193 - 986 pg/mL    Folate 14.5 5.0 - 21.0 ng/mL   LIPID PANEL    Collection Time: 04/05/20  3:03 AM   Result Value Ref Range    LIPID PROFILE          Cholesterol, total 186 <200 MG/DL    Triglyceride 126 <150 MG/DL    HDL Cholesterol 43 MG/DL    LDL, calculated 117.8 (H) 0 - 100 MG/DL    VLDL, calculated 25.2 MG/DL    CHOL/HDL Ratio 4.3 0.0 - 5.0       NEUROLOGICAL EXAM:     Appearance: The patient is well developed, well nourished, provides a coherent history and is in no acute distress. Mental Status: Oriented to time, place and person. Fluent, no aphasia or dysarthria. Mood and affect appropriate. Cranial Nerves:   Intact visual fields. VANESSA, EOM's full, no nystagmus, no ptosis. Facial sensation is normal. Corneal reflexes are intact. Facial movement is symmetric. Hearing is normal bilaterally. Palate is midline with normal elevation. Sternocleidomastoid and trapezius muscles are normal. Tongue is midline. Motor:  4/5 RUE strength and 5/5 RLE and left extremity. Normal bulk and tone. No fasciculations. Right UE pronation but no drift. Reflexes:   Slight hyperreflexic on the right. Downgoing toes. Sensory:   Normal to cold and vibration. Gait:  Slightly favors right leg. No Romberg. Tremor:   No tremor noted. Cerebellar:  Intact FTN/JOSÉ MIGUEL but slow on the right. Intact bilateral HTS. Assessment:   Acute CVA - embolic  PFO  Carotid stenosis  Hyperlipidemia    Plan:   Neurological examination reveals residual mild right upper extremity weakness and hyperreflexia. Status post left hemisphere small infarcts suspicious for embolic phenomenon. Head CT without contrast did not reveal any acute process. Brain MRI revealed multiple small acute left posterior frontal/parietal and posterior lateral occipital cortex infarct. Head CTA did not reveal any flow limiting stenosis or aneurysm.   Neck CTA revealed 60% distal left common carotid artery stenosis and less than 50% bilateral proximal ICA stenosis. No intervention necessary at this time. Monitor for now. Lipid was elevated. Should be <70. Adjust statin therapy. Echocardiogram revealed EF of 50 to 55% with a large PFO. Appreciate cardiology input. Plan is to put a loop recorder to assess for cardiac arrhythmia. From a neurological standpoint, I would recommend closure of PFO. Lower extremity Doppler studies were negative for DVT. Hypercoagulable work-up is ongoing. Results to be followed in clinic. Continue aspirin 81 mg daily for stroke prophylaxis. Recommend adjusting BP regimen to bring up her blood pressure. Patient was advised that per [place] regulation she cannot drive for 6 months. Patient is okay to be discharged from a neurological standpoint. Have patient follow-up in my clinic (Dr. Yumiko Mccullough) 4 weeks after discharge.     Signed:  Leighann Gonzalez MD  4/5/2020  11:14 AM

## 2020-04-05 NOTE — PROGRESS NOTES
Bedside shift change report given to [facility] (oncoming nurse) by Layne Heart (offgoing nurse). Report included the following information SBAR, Kardex, ED Summary, Procedure Summary, Intake/Output, MAR, Recent Results and Cardiac Rhythm NSRT. 1400 Per [facility], no home health available due to lack of insurance. Dr Maura Camacho notified. Pt may go home and follow up with the primary care doctor.

## 2020-04-05 NOTE — PROGRESS NOTES
Shift Change:     Bedside and Verbal shift change report given to [facility] (oncoming nurse) by Lucero Soria (offgoing nurse). Report included the following information SBAR, Kardex and Recent Results. Shift Summary:    Shift Change:     Bedside and Verbal shift change report given to [facility] (oncoming nurse) by Bella Rooney (offgoing nurse). Report included the following information SBAR, Kardex and Recent Results.

## 2020-04-05 NOTE — DISCHARGE SUMMARY
Orlando Mcbride [facility] [address], [place], [address] [facility]  Tel: [phone]    Physician Discharge Summary    Patient ID:    Tabatha Sandoval  Age:              63 y.o.    : 1960  MRN:             [id number]     PCP: Linda Chaparro NP     Date of Admission: 4/3/2020    Date of Discharge:  2020    Principal admission Diagnosis:   Right sided weakness [R53.1]  Acute CVA (cerebrovascular accident) [facility]) [I63.9]    Discharge Diagnoses:  Principal Problem:    Acute CVA (cerebrovascular accident) ([facility] (4/3/2020)    Hypothyroidism (4/3/2020)    Dyslipidemia (4/3/2020)    COPD (chronic obstructive pulmonary disease) ([facility] (4/3/2020)    HTN (hypertension), benign (4/3/2020)    PFO (patent foramen ovale) (4/3/2020)    Weakness of right upper extremity (2020)    Consults: Cardiology and Neurology    Diagnostics:     MRi brain - multifocal small acute left posterior frontal/parietal cortical infarcts in pattern most suggestive of emboli. Multifocal areas of T2 hyperintensity in cerebral white matter are nonspecific and possibly related to chronic small vessel ischemia.     CTA head and neck - No evidence of significant stenosis or aneurysm.     CTA Neck - No evidence of flow-limiting stenosis. Dense calcific atherosclerosis of the left carotid bifurcation with approximately 60% stenosis of the distal left common carotid artery, and less than 50% stenosis of the proximal left internal carotid artery. Dense calcific atherosclerosis of the right carotid bifurcation with less than 50% stenosis of the proximal right internal carotid artery.     Echocardiogram - saline contrast was given to evaluate for intracardiac shunt. Normal cavity size and wall thickness. Low normal systolic function. Estimated left ventricular ejection fraction is 50 - 55%. Visually measured ejection fraction. No regional wall motion abnormality noted.  Agitated saline contrast study was performed and color flow Doppler was used. detected by saline contrast suggestive of a patent foramen ovale. There was a severe and right to left shunt, with Valsalva maneuver confirmed by saline contrast suggestive of a patent foramen ovale.     Venous doppler lower extremities - neg for DVT     Telemetry reviewed by me: sinus with PVCs      Hospital Course:     Ms. Ihsan Putnam is a 63 y.o. admitted to [facility] and treated for the following:    Acute CVA (cerebrovascular accident) ([facility] [address] (4/3/2020) / Right sided upper extremity weakness (4/3/2020): has multifocal left sided infarcts. Non obstructive carotids but a large PFO. Neg venous dopplers. . Continue Asprin. Lipitor. Due to her having no health insurance, attempts to have home health arranged were unsuccessful. Seen by neurology whom she will see as a follow to review pending hypercoagulable work up as well as cardiology for event recorder review.       COPD (chronic obstructive pulmonary disease) ([facility] (4/3/2020): much better. Resume home medications as below.      Hypothyroidism (4/3/2020): TSH is normal. Continue levothyroxine     Dyslipidemia (4/3/2020): . Continue lipitor     HTN (hypertension), benign (4/3/2020): BP well controlled. No medications.      PFO (patent foramen ovale) (4/3/2020): work up and follow up as above with cardiology    Discharge Exam:    Visit Vitals  /63 (BP 1 Location: Left arm, BP Patient Position: At rest)   Pulse 70   Temp 98.6 °F (37 °C)   Resp 18   Ht 5' (1.524 m)   Wt 59 kg (130 lb)   SpO2 97%   BMI 25.39 kg/m²        Patient has been seen and examined.     General: well looking and stable patient in no acute distress  Pulm: clear breath sounds without wheezes  Card: no murmurs, normal S1, S2 without thrills, bruits   Abd:    soft, non-tender, normoactive bowel sounds  Skin: no rashes or ulcers, skin turgor is good  Neuro: awake, alert and has a RUE weakness      Activity: Activity as tolerated    Diet: Cardiac Diet    Current Discharge Medication List      START taking these medications    Details   aspirin 81 mg chewable tablet Take 1 Tab by mouth daily for 30 days. Indications: stroke prevention  Qty: 30 Tab, Refills: 0      atorvastatin (LIPITOR) 80 mg tablet Take 1 Tab by mouth nightly for 30 days. Qty: 30 Tab, Refills: 1         CONTINUE these medications which have NOT CHANGED    Details   PARoxetine (PaxiL) 20 mg tablet Take 20 mg by mouth daily. budesonide-formoteroL (Symbicort) 160-4.5 mcg/actuation HFAA Take 2 Puffs by inhalation two (2) times a day. levothyroxine (SYNTHROID) 200 mcg tablet Take 200 mcg by mouth Daily (before breakfast). risedronate (ActoneL) 35 mg tablet Take 35 mg by mouth every seven (7) days. albuterol (PROVENTIL HFA, VENTOLIN HFA, PROAIR HFA) 90 mcg/actuation inhaler Take 2 Puffs by inhalation every six (6) hours as needed for Wheezing. STOP taking these medications       lovastatin (MEVACOR) 40 mg tablet Comments:   Reason for Stopping: Follow-up Information     Follow up With Specialties Details Why Nighat Cabrera MD Cardiology In 4 weeks office will call with date/time of appt.  [facility]      Pasquale Cardenas NP Nurse Practitioner  As needed [address]  [address] [facility]  [phone]      Cj Lutz NP Nurse Practitioner Call to confirm follow up with neurology - Dr Niki Griffith [address] [facility]  [address] [place]  [phone]            Follow-up tests or labs: Hypercoagulable work up    Discharge Condition: Stable    Disposition: home    Time taken to arrange discharge:  35 minutes.     Signed:  Wilian Barnett MD     [facility]  2020   7:52 AM

## 2020-04-05 NOTE — DISCHARGE INSTRUCTIONS
Eli will mail you a heart monitor to wear after discharge. Instructions will be included. If you need further assistance , call Dr. Octavia Verduzco office at [phone]            HOSPITALIST DISCHARGE INSTRUCTIONS    NAME: Garth Simmonds   :  1960   MRN:  [id number]     Date:     2020    ADMIT DATE: 4/3/2020     DISCHARGE DATE: 2020     PRINCIPAL ADMITTING DIAGNOSIS:  Right sided weakness [R53.1]  Acute CVA (cerebrovascular accident) ([facility] [address] [address] [I63.9]    DISCHARGE DIAGNOSES:  Principal Problem:    Acute CVA (cerebrovascular accident) ([facility] [address] [address] (4/3/2020)    Right sided weakness (4/3/2020)    Hypothyroidism (4/3/2020)    Dyslipidemia (4/3/2020)    COPD [facility]ch[facility] (4/3/2020)    HTN (hypertension), benign (4/3/2020)    PFO (patent foramen ovale) (4/3/2020)    MEDICATIONS:    · It is important that medications are taken exactly as they are prescribed on the discharge medication instructions and keep them your  in the bottles provided by the pharmacist.   · Keep a list of the medication names, dosages, and times to be taken at all times. · Do not take other medications without consulting your doctor. Recommended diet:  Cardiac Diet    Recommended activity: Activity as tolerated    Post discharge care:    Notify follow up health care provider or return to the emergency department if you cannot get hold of your doctor if you feel worse or experience symptoms similar to those that brought you to hospital    Follow-up Information     Follow up With Specialties Details Why Ridge Bryant MD Cardiology In 4 weeks office will call with date/time of appt.   [address] [facility],[address]      Hi Powell NP Nurse Practitioner  [facility]  [address] [place] [address],[address] [facility]  [phone]      Wes Valadez NP Nurse Practitioner Call to confirm follow up with neurology - Dr Rosmery Corrales [address]  [phone]            Information obtained by :  I understand that if any problems occur once I am at home I am to contact my physician and I understand and acknowledge receipt of the instructions indicated above.                                                                                                                                            Physician's or R.N.'s Signature                                                                  Date/Time                                                                                                                                              Patient or Representative Signature                                                          Date/Time

## 2020-04-05 NOTE — PROGRESS NOTES
1905: Bedside and Verbal shift change report given to [address] [place], [place] [address]e Se (oncoming nurse) by Maynor Dean RN (offgoing nurse). Report included the following information SBAR and Kardex. 0715: Bedside and Verbal shift change report given to [facility] RN (oncoming nurse) by Braulio Mcintyre (offgoing nurse). Report included the following information SBAR, Kardex and Recent Results.

## 2020-04-05 NOTE — PROGRESS NOTES
1359:  [facility] could not accept this patient for any royal visits or with Medicaid pending. Nursing notified that no one could accept and asked to get a script for OP PT. JAZZ Barba    1107:  [facility] unable to accept this patient for hh. JAZZ Barba    1012:  [facility] unable to accept pt. JAZZ Barba    1555:  [facility] unable to accept pt. Referrals sent to [facility], [facility] and [facility]. JAZZ Barba    7266:  [facility] unable to accept pt. A referal was sent to [facility]. JAZZ Barba    CM Note:  Pt stated she will be transported to her home in [place] by her son. She stated her granddaughter lives with her. Pt does not have insurance and stated she has a friend who has a RW. In the event there isn't one, her son will purchase a RW. Pt stated she has applied for Medicaid several weeks ago. She had no preference for a home health agency and was agreeable to [facility]. If they are unable to accept her, will see if they can do a couple of royal visits. A referral was sent in [facility].   JAZZ Barba

## 2020-04-05 NOTE — PROGRESS NOTES
Problem: Falls - Risk of  Goal: *Absence of Falls  Description: Document Jersey Booker Fall Risk and appropriate interventions in the flowsheet. Outcome: Progressing Towards Goal  Note: Fall Risk Interventions:  Mobility Interventions: Bed/chair exit alarm, Patient to call before getting OOB, Strengthening exercises (ROM-active/passive), Utilize walker, cane, or other assistive device         Medication Interventions: Bed/chair exit alarm, Patient to call before getting OOB, Teach patient to arise slowly    Elimination Interventions: Bed/chair exit alarm, Call light in reach              Problem: Patient Education: Go to Patient Education Activity  Goal: Patient/Family Education  Outcome: Progressing Towards Goal     Problem: Pressure Injury - Risk of  Goal: *Prevention of pressure injury  Description: Document Jd Scale and appropriate interventions in the flowsheet. Outcome: Progressing Towards Goal  Note: Pressure Injury Interventions:  Sensory Interventions: Assess changes in LOC, Keep linens dry and wrinkle-free, Turn and reposition approx. every two hours (pillows and wedges if needed)    Moisture Interventions: Absorbent underpads    Activity Interventions: Increase time out of bed    Mobility Interventions: PT/OT evaluation, Turn and reposition approx.  every two hours(pillow and wedges)    Nutrition Interventions: Document food/fluid/supplement intake, Offer support with meals,snacks and hydration                     Problem: Patient Education: Go to Patient Education Activity  Goal: Patient/Family Education  Outcome: Progressing Towards Goal     Problem: Patient Education: Go to Patient Education Activity  Goal: Patient/Family Education  Outcome: Progressing Towards Goal     Problem: Patient Education: Go to Patient Education Activity  Goal: Patient/Family Education  Outcome: Progressing Towards Goal     Problem: Patient Education: Go to Patient Education Activity  Goal: Patient/Family Education  Outcome: Progressing Towards Goal     Problem: TIA/CVA Stroke: 0-24 hours  Goal: Activity/Safety  Outcome: Progressing Towards Goal  Goal: Consults, if ordered  Outcome: Progressing Towards Goal  Goal: Diagnostic Test/Procedures  Outcome: Progressing Towards Goal  Goal: Nutrition/Diet  Outcome: Progressing Towards Goal  Goal: Discharge Planning  Outcome: Progressing Towards Goal  Goal: Medications  Outcome: Progressing Towards Goal  Goal: Respiratory  Outcome: Progressing Towards Goal  Goal: Treatments/Interventions/Procedures  Outcome: Progressing Towards Goal  Goal: Minimize risk of bleeding post-thrombolytic infusion  Outcome: Progressing Towards Goal  Goal: Monitor for complications post-thrombolytic infusion  Outcome: Progressing Towards Goal  Goal: Psychosocial  Outcome: Progressing Towards Goal  Goal: *Hemodynamically stable  Outcome: Progressing Towards Goal  Goal: *Neurologically stable  Description: Absence of additional neurological deficits    Outcome: Progressing Towards Goal  Goal: *Verbalizes anxiety and depression are reduced or absent  Outcome: Progressing Towards Goal  Goal: *Absence of Signs of Aspiration on Current Diet  Outcome: Progressing Towards Goal  Goal: *Absence of deep venous thrombosis signs and symptoms(Stroke Metric)  Outcome: Progressing Towards Goal  Goal: *Ability to perform ADLs and demonstrates progressive mobility and function  Outcome: Progressing Towards Goal  Goal: *Stroke education started(Stroke Metric)  Outcome: Progressing Towards Goal  Goal: *Dysphagia screen performed(Stroke Metric)  Outcome: Progressing Towards Goal  Goal: *Rehab consulted(Stroke Metric)  Outcome: Progressing Towards Goal     Problem: Patient Education: Go to Patient Education Activity  Goal: Patient/Family Education  Outcome: Progressing Towards Goal     Problem: Patient Education: Go to Patient Education Activity  Goal: Patient/Family Education  Outcome: Progressing Towards Goal

## 2020-04-06 ENCOUNTER — PATIENT OUTREACH (OUTPATIENT)
Dept: INTERNAL MEDICINE CLINIC | Age: 60
End: 2020-04-06

## 2020-04-06 LAB
AT III PPP CHRO-ACNC: 88 % (ref 75–135)
INTERPRETATION, 117893: NORMAL
PROT C PPP-ACNC: 128 % (ref 73–180)
PROT S ACT/NOR PPP: 114 % (ref 63–140)
PROTHROM ACT/NOR PPP: 109 % (ref 50–154)
SCREEN APTT: 38 SEC (ref 0–51.9)
SCREEN DRVVT: 35.3 SEC (ref 0–47)

## 2020-04-06 NOTE — PROGRESS NOTES
Patient contacted regarding recent discharge and COVID-19 risk   Care Transition Nurse/ Ambulatory Care Manager contacted the patient by telephone to perform post discharge assessment. Verified name and  with patient as identifiers. Patient has following risk factors of: CVA. CTN/ACM reviewed discharge instructions, medical action plan and red flags related to discharge diagnosis. Reviewed and educated them on any new and changed medications related to discharge diagnosis. Advised obtaining a 90-day supply of all daily and as-needed medications. Education provided regarding infection prevention, and signs and symptoms of COVID-19 and when to seek medical attention with patient who verbalized understanding. Discussed exposure protocols and quarantine from [address] you at higher risk for severe illness  and given an opportunity for questions and concerns. The patient agrees to contact the COVID-19 hotline [phone] or PCP office for questions related to their healthcare. CTN/ACM provided contact information for future reference. From CDC: Are you at higher risk for severe illness?  Wash your hands often.  Avoid close contact (6 feet, which is about two arm lengths) with people who are sick.  Put distance between yourself and other people if COVID-19 is spreading in your community.  Clean and disinfect frequently touched surfaces.  Avoid all cruise travel and non-essential air travel.  Call your healthcare professional if you have concerns about COVID-19 and your underlying condition or if you are sick. For more information on steps you can take to protect yourself, see CDC's How to [place] for follow-up call in 14 days based on severity of symptoms and risk factors    Plan for patient to set up MyChart to allow for VV. Patient family member is picking up Lipitor prescription today. Detail Level: Detailed

## 2020-04-07 LAB
CARDIOLIPIN IGG SER IA-ACNC: <9 GPL U/ML (ref 0–14)
CARDIOLIPIN IGM SER IA-ACNC: <9 MPL U/ML (ref 0–12)

## 2020-04-09 LAB — F5 GENE MUT ANL BLD/T: NORMAL

## 2020-04-16 ENCOUNTER — CLINICAL SUPPORT (OUTPATIENT)
Dept: CARDIOLOGY CLINIC | Age: 60
End: 2020-04-16

## 2020-04-16 DIAGNOSIS — I48.91 ATRIAL FIBRILLATION, UNSPECIFIED TYPE (HCC): ICD-10-CM

## 2020-04-16 DIAGNOSIS — I63.9 CEREBROVASCULAR ACCIDENT (CVA), UNSPECIFIED MECHANISM (HCC): Primary | ICD-10-CM

## 2020-04-20 ENCOUNTER — TELEPHONE (OUTPATIENT)
Dept: CARDIOLOGY CLINIC | Age: 60
End: 2020-04-20

## 2020-04-20 ENCOUNTER — PATIENT OUTREACH (OUTPATIENT)
Dept: INTERNAL MEDICINE CLINIC | Age: 60
End: 2020-04-20

## 2020-04-20 NOTE — PROGRESS NOTES
Patient resolved from Transition of Care episode on 4/20/2020. Patient/family has been provided the following resources and education related to COVID-19 on past conversations. Patient currently reports that the following symptoms have improved:  no current symptoms     No further outreach scheduled with this CTN/ACM. Episode of Care resolved. Patient has this CTN/ACM contact information if future needs arise. Patient was discharged from [facility] 4/5 after acute CVA. Patient reports some of the deficits noted post CVA have resolved. She now feels that her arm strength has improved as well as her speech. She is encouraged to follow up with neuro clinic. She will call clinic to arrange appointment or VV.      Patient has completed medicaid application and spoke with SS.

## 2020-04-20 NOTE — TELEPHONE ENCOUNTER
Patient would like to speak with someone regarding her event monitor that she was supposed to receive in the mail.      Phone: [phone]

## 2020-04-21 ENCOUNTER — TELEPHONE (OUTPATIENT)
Dept: CARDIOLOGY CLINIC | Age: 60
End: 2020-04-21

## 2020-04-21 ENCOUNTER — DOCUMENTATION ONLY (OUTPATIENT)
Dept: CARDIOLOGY CLINIC | Age: 60
End: 2020-04-21

## 2020-04-21 NOTE — TELEPHONE ENCOUNTER
EKG alert from [employer]:  Multifocal PVC's 23 in 1 min  V tach 5 beats, 3 beats    Faxing to [phone]

## 2020-04-21 NOTE — PROGRESS NOTES
Received serious loop alert      4/21/20   11:21am   Auto Trigger   Ventricular Tachycardia (5 & 3 beats)/Sinus Rhythm/Couplet PVCs/MF PVCs (23 in 1 min) rates up to 161 bpm      Informed Dr Brian Zapien

## 2020-04-22 ENCOUNTER — DOCUMENTATION ONLY (OUTPATIENT)
Dept: CARDIOLOGY CLINIC | Age: 60
End: 2020-04-22

## 2020-04-22 NOTE — PROGRESS NOTES
Received serious loop alert      4/21/20   7:04pm   Auto Trigger   Sinus Rhythm w/Run of VT (4beats)/Couplet PVCs/Trigeminal PVCs/MF PVCs (29 in 1 min) rates up to 161 bpm      Informed Dr Ximena Kilpatrick

## 2020-04-24 ENCOUNTER — DOCUMENTATION ONLY (OUTPATIENT)
Dept: CARDIOLOGY CLINIC | Age: 60
End: 2020-04-24

## 2020-04-24 NOTE — PROGRESS NOTES
Received serious loop alert      4/23/20   2:44pm   Auto Trigger   Sinus Rhythm w/Run of V-Tach (7 Beats)/PVCs (15 in 1min) rates up to 179 bpm      Informed Dr Selma Redman

## 2020-04-24 NOTE — PROGRESS NOTES
Spoke with Ms Noemy Wilian regarding here event monitor - thus far frequent PVCs, runs of NSVT 3-5 beats. She does note some palpitations. KNOX improved after stopping smoking. No chest pain. Ambulates with walker d/t balance issues post stroke. I favor stress imaging to r/o ischemia. Recent echo with normal LVEF. She does have significant PFO but also has moderate ICA disease. She agrees with this strategy. Will schedule lexiscan cardiolite to evaluate further.  Continue rhythm monitoring

## 2020-04-30 ENCOUNTER — DOCUMENTATION ONLY (OUTPATIENT)
Dept: CARDIOLOGY CLINIC | Age: 60
End: 2020-04-30

## 2020-04-30 NOTE — PROGRESS NOTES
MCT loop mailed per Dr Andrei Catherine dx: CVA, AF  Non chargeable visit.   Sharon per [facility]

## 2020-05-01 ENCOUNTER — DOCUMENTATION ONLY (OUTPATIENT)
Dept: CARDIOLOGY CLINIC | Age: 60
End: 2020-05-01

## 2020-05-01 NOTE — PROGRESS NOTES
Received serious loop alerts      4/30/20   1:37pm   Auto Trigger   Sinus Tachycardia w/Run of VT (Multiple)/Bigeminal PVCs/MF PVCs (34 in 1 min)/Artifact rates up to 182 bpm    4/30/20   6:40pm   Auto Trigger   Sinus Tachycardia/Couplet PVCs/MF PVCs (28 in 1 min) rates up to 113 bpm      Informed Dr Mendez Army

## 2020-05-04 ENCOUNTER — DOCUMENTATION ONLY (OUTPATIENT)
Dept: CARDIOLOGY CLINIC | Age: 60
End: 2020-05-04

## 2020-05-04 NOTE — PROGRESS NOTES
Received multi serious loop alerts from the weekend      5/2/20   1:26pm   Auto Trigger   Sinus Tachycardia w/Run of VT (4 beats)/Couplet PVCs/PVCs (40 in 1 min) rates up to 189 bpm    5/2/20   3:12pm   Auto Trigger   Sinus Tachycardia w/Run of VT (5 beats)/Couplet PVCs/PVCs (21 in 1 min) rates up to 146 bpm    5/2/20   5:18pm   Auto Trigger   Sinus Rhythm/Couplet PVCs/Bigeminal PVCs/PVCs (25 in 1 min)/PACs rates up to 100 bpm    5/3/20   1:36am   Auto Trigger   Sinus Rhythm/Couplet PVCs/Bigeminal PVCs/MF PVCs (42 in 1 min) rates up to 100 bpm    5/3/20   3:05pm   Auto Trigger   Sinus Rhythm w/Run of VT (3 beats)/Bigeminal PVCs/Couplet PVCs/PVCs (36 in 1 min) rates up to 217 bpm    5/3/20   8:59pm   Auto Trigger   Sinus Rhythm w/Run of VT (4 & 3 beats)/Couplet PVCs/MF PVCs (38 in 1 min) rates up to 180 bpm      Informed Dr Flores Centers

## 2020-05-05 ENCOUNTER — VIRTUAL VISIT (OUTPATIENT)
Dept: CARDIOLOGY CLINIC | Age: 60
End: 2020-05-05

## 2020-05-05 ENCOUNTER — DOCUMENTATION ONLY (OUTPATIENT)
Dept: CARDIOLOGY CLINIC | Age: 60
End: 2020-05-05

## 2020-05-05 DIAGNOSIS — I63.9 ACUTE CVA (CEREBROVASCULAR ACCIDENT) (HCC): ICD-10-CM

## 2020-05-05 DIAGNOSIS — Q21.12 PFO (PATENT FORAMEN OVALE): ICD-10-CM

## 2020-05-05 DIAGNOSIS — I10 HTN (HYPERTENSION), BENIGN: ICD-10-CM

## 2020-05-05 DIAGNOSIS — J44.9 CHRONIC OBSTRUCTIVE PULMONARY DISEASE, UNSPECIFIED COPD TYPE (HCC): ICD-10-CM

## 2020-05-05 DIAGNOSIS — E78.5 DYSLIPIDEMIA: ICD-10-CM

## 2020-05-05 DIAGNOSIS — I49.3 MULTIFOCAL PVCS WITH PAIRING: Primary | ICD-10-CM

## 2020-05-05 RX ORDER — METOPROLOL SUCCINATE 25 MG/1
25 TABLET, EXTENDED RELEASE ORAL
Qty: 30 TAB | Refills: 3 | Status: SHIPPED | OUTPATIENT
Start: 2020-05-05 | End: 2020-06-04 | Stop reason: DRUGHIGH

## 2020-05-05 NOTE — PROGRESS NOTES
Received serious loop alerts    5/4/20   10:10pm   Auto Trigger   Sinus Rhythm w/Run of VT (4 beats)/Couplet PVCs/Bigeminal PVCs/PVCs (27 in 1 min) rates up to 167 bpm    5/4/20   10:53pm   Auto Trigger   Sinus Rhythm w/Run of VT (3-7 beats)/Couplet PVCs/Bigeminal PVCs/PVCs (47 in 1 min) rates up to 154 bpm      Informed Dr Choudhary Half

## 2020-05-05 NOTE — PROGRESS NOTES
VIRTUAL VISIT DOCUMENTATION     Pursuant to the emergency declaration under the [facility], [facility] waiver authority and the [employer] Act, this Virtual  Visit was conducted, with patient's consent, to reduce the patient's risk of exposure to COVID-19 and provide continuity of care for an established patient. Services were provided through a video synchronous discussion virtually to substitute for in-person clinic visit. CHIEF COMPLAINT      Marsha Freedman is a 63 y.o. female who was seen by synchronous (real-time) audio-video technology on 5/5/2020. Patient is being seen today for followup from recent hosp with stroke with findings of PFO, left ICA disease and frequent complex ventricular ectopy on loop recorder     ASSESSMENT        ICD-10-CM ICD-9-CM    1. 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 Dyslipidemia E78.5 272.4    3. Chronic obstructive pulmonary disease, unspecified COPD type ([facility] J44.9 496    4. HTN (hypertension), benign I10 401.1    5. PFO (patent foramen ovale) Q21.1 745.5    6. Acute CVA (cerebrovascular accident) ([facility] [address] I63.9 434.91         PLAN     Complex ventricular ectopy - NSVT, multifocal PVCs, largely asx. lexiscan cardiolite normal. Normal LVEF by gated nuclear and recent echo. - start toprol xl 25 mg qhs  - refer to Dr Kimmie James    PFO, prominent right to left shunting by bubble study   - refer to Dr Thania Durbin for consideration of percutaneous closure    Moderate left ICA disease  - continue aspirin and statin Rx    Left frontal/parietal infarction April 7354, embolic pattern. Etiology includes PFO vs atherothrombotic due to moderate left ICA disease. No AF by event recorder  - continue aspirin  - structural heart referral as noted above    COPD/nicotine use - smoke free since stroke      We discussed the expected course, resolution and complications of the diagnosis(es) in detail. Medication risks, benefits, costs, interactions, and alternatives were discussed as indicated. I advised her to contact the office if her condition worsens, changes or fails to improve as anticipated. She expressed understanding with the diagnosis(es) and plan    HISTORY OF PRESENTING ILLNESS      Tyrell Griffin is a 63 y.o. female   Aware of PVCs - skipped beats  Feels tired all the time  Some KNOX but better since stopped smoking  Patient denies any exertional chest pain, , , syncope, orthopnea, edema or paroxysmal nocturnal dyspnea.     /80, HR 68    Event recorder reviewed - multiple PVCs, mulitfocal, short runs NSVT    lexiscan cardiolite this week reviewed - normal     ACTIVE PROBLEM LIST     Patient Active Problem List    Diagnosis Date Noted    Weakness of right upper extremity 04/05/2020    Hypothyroidism 04/03/2020    Dyslipidemia 04/03/2020    COPD (chronic obstructive pulmonary disease) ([facility] 04/03/2020    HTN (hypertension), benign 04/03/2020    Acute CVA (cerebrovascular accident) ([facility] [address] [facility] 04/03/2020    PFO (patent foramen ovale) 04/03/2020           PAST MEDICAL HISTORY     Past Medical History:   Diagnosis Date    Chronic obstructive pulmonary disease ([address]     Diabetes ([facility] [address]     Borderline    Hypercholesteremia     Hypertension     Stroke ([facility] [address] [facility]     Thyroid disease            PAST SURGICAL HISTORY     Past Surgical History:   Procedure Laterality Date    CARDIAC SURG PROCEDURE UNLIST      cath    HX APPENDECTOMY      HX CHOLECYSTECTOMY      HX GYN      hysterectomy          ALLERGIES     No Known Allergies       FAMILY HISTORY     Family History   Problem Relation Age of Onset    Stroke Neg Hx     negative for cardiac disease       SOCIAL HISTORY     Social History     Socioeconomic History    Marital status: SINGLE     Spouse name: Not on file    Number of children: Not on file    Years of education: Not on file    Highest education level: Not on file   Tobacco Use    Smoking status: Current Every Day Smoker     Packs/day: 1.00    Smokeless tobacco: Never Used   Substance and Sexual Activity    Alcohol use: Never     Frequency: Never    Drug use: Never         MEDICATIONS     Current Outpatient Medications   Medication Sig    aspirin 81 mg chewable tablet Take 1 Tab by mouth daily for 30 days. Indications: stroke prevention    atorvastatin (LIPITOR) 80 mg tablet Take 1 Tab by mouth nightly for 30 days.  PARoxetine (PaxiL) 20 mg tablet Take 20 mg by mouth daily.  budesonide-formoteroL (Symbicort) 160-4.5 mcg/actuation HFAA Take 2 Puffs by inhalation two (2) times a day.  levothyroxine (SYNTHROID) 200 mcg tablet Take 200 mcg by mouth Daily (before breakfast).  risedronate (ActoneL) 35 mg tablet Take 35 mg by mouth every seven (7) days.  albuterol (PROVENTIL HFA, VENTOLIN HFA, PROAIR HFA) 90 mcg/actuation inhaler Take 2 Puffs by inhalation every six (6) hours as needed for Wheezing. No current facility-administered medications for this visit. I have reviewed the nurses notes, vitals, problem list, allergy list, medical history, family, social history and medications. REVIEW OF SYMPTOMS     Constitutional: Negative for fever, chills, malaise/fatigue and diaphoresis. Respiratory: Negative for cough, hemoptysis, sputum production, shortness of breath and wheezing. Cardiovascular: Negative for chest pain, palpitations, orthopnea, claudication, leg swelling and PND. Gastrointestinal: Negative for heartburn, nausea, vomiting, blood in stool and melena. Genitourinary: Negative for dysuria and flank pain. Musculoskeletal: Negative for joint pain and back pain. Skin: Negative for rash. Neurological: Negative for focal weakness, seizures, loss of consciousness, weakness and headaches. Endo/Heme/Allergies: Negative for abnormal bleeding. Psychiatric/Behavioral: Negative for memory loss.       PHYSICAL EXAMINATION      Due to this being a TeleHealth evaluation, many elements of the physical examination are unable to be assessed. General: Well developed, in no acute distress, cooperative and alert  HEENT: Pupils equal/round. No marked JVD visible on video. Respiratory: No audible wheezing, no signs of respiratory distress, lips non cyanotic  Extremities:  No edema  Neuro: A&Ox3, speech clear, no facial droop, answering questions appropriately  Skin: Skin color is normal. No rashes or lesions. Non diaphoretic on visible skin during exam       DIAGNOSTIC DATA      No specialty comments available. LABORATORY DATA      Lab Results   Component Value Date/Time    WBC 8.6 04/03/2020 12:04 PM    HGB 15.5 04/03/2020 12:04 PM    HCT 46.8 04/03/2020 12:04 PM    PLATELET 154 53/44/3469 12:04 PM    MCV 97.9 04/03/2020 12:04 PM      Lab Results   Component Value Date/Time    Sodium 138 04/03/2020 12:04 PM    Potassium 4.4 04/03/2020 12:04 PM    Chloride 110 (H) 04/03/2020 12:04 PM    CO2 22 04/03/2020 12:04 PM    Anion gap 6 04/03/2020 12:04 PM    Glucose 111 (H) 04/03/2020 12:04 PM    BUN 13 04/03/2020 12:04 PM    Creatinine 0.74 04/03/2020 12:04 PM    BUN/Creatinine ratio 18 04/03/2020 12:04 PM    GFR est AA >60 04/03/2020 12:04 PM    GFR est non-AA >60 04/03/2020 12:04 PM    Calcium 9.2 04/03/2020 12:04 PM    Bilirubin, total 0.2 04/03/2020 12:04 PM    AST (SGOT) 9 (L) 04/03/2020 12:04 PM    Alk. phosphatase 79 04/03/2020 12:04 PM    Protein, total 7.6 04/03/2020 12:04 PM    Albumin 3.8 04/03/2020 12:04 PM    Globulin 3.8 04/03/2020 12:04 PM    A-G Ratio 1.0 (L) 04/03/2020 12:04 PM    ALT (SGPT) 20 04/03/2020 12:04 PM             FOLLOW-UP            Patient was made aware and verbalized understanding that an appointment will be scheduled for them for a virtual visit and/or office visit within the above time frame. Patient understanding his/her responsibility to call and change time/date if he/she so chooses.     Thank you, Adela Shaw NP for allowing me to participate in the care of Raúl Nash. Please do not hesitate to contact me for further questions/concerns. Greater than 20 minutes was spent in direct video patient care, planning and chart review. This visit was conducted using [employer] telemedicine services.        Rj Chirinos MD    [address]        [phone] / [phone] Fax       [facility]  [address] [place] [address], [address] [place] [address],[address]  [place]  [phone] / [phone] Fax

## 2020-05-07 ENCOUNTER — DOCUMENTATION ONLY (OUTPATIENT)
Dept: CARDIOLOGY CLINIC | Age: 60
End: 2020-05-07

## 2020-05-07 NOTE — PROGRESS NOTES
Received serious loop alerts    5/5/20   5:41pm   Auto Trigger   Sinus Rhythm w/Run of VT (8 beats)/PVCs (4 in 1 min) rates up to 169 bpm    5/6/20   3:30am   Auto Trigger   Sinus Tachycardia w/Run of VT (3 beats)/Couplet PVCs/MF PVCs (26 in 1 min) rates up to 181 bpm    5/6/20   8:13pm   Auto Trigger   Sinus Tachycardia w/Run of VT (3 beats)/Couplet PVCs/Bigeminal PVCs/PVCs (31 in 1 min) rates up to 200 bpm      Informed Dr Marita Giron

## 2020-05-08 ENCOUNTER — DOCUMENTATION ONLY (OUTPATIENT)
Dept: CARDIOLOGY CLINIC | Age: 60
End: 2020-05-08

## 2020-05-08 NOTE — PROGRESS NOTES
Received serious loop alerts      5/7/20   5:13pm   Auto Trigger   Sinus Rhythm w/Run of VT (3 beats)/Bigeminal PVCs/Couplet PVCs/MF PVCs (26 in 1 min) rates up to 200 bpm    5/7/20   7:05pm   Auto Trigger   Sinus Rhythm w/Run of VT (5 beats)/Couplet PVCs/PVCs 928 in 1 min) rates up to 122 bpm    5/7/20   11:43pm   Auto Trigger   Sinus Rhythm/Couplet PVCs/PVCs (24 in 1 min) rates up to 94 bpm      Informed Dr Blain Sacks

## 2020-05-12 ENCOUNTER — VIRTUAL VISIT (OUTPATIENT)
Dept: CARDIOLOGY CLINIC | Age: 60
End: 2020-05-12

## 2020-05-12 VITALS
HEART RATE: 93 BPM | DIASTOLIC BLOOD PRESSURE: 84 MMHG | BODY MASS INDEX: 25.52 KG/M2 | SYSTOLIC BLOOD PRESSURE: 110 MMHG | HEIGHT: 60 IN | WEIGHT: 130 LBS

## 2020-05-12 DIAGNOSIS — E11.9 TYPE 2 DIABETES MELLITUS WITHOUT COMPLICATION, WITHOUT LONG-TERM CURRENT USE OF INSULIN (HCC): ICD-10-CM

## 2020-05-12 DIAGNOSIS — E78.2 MIXED HYPERLIPIDEMIA: ICD-10-CM

## 2020-05-12 DIAGNOSIS — I63.412 CEREBROVASCULAR ACCIDENT (CVA) DUE TO EMBOLISM OF LEFT MIDDLE CEREBRAL ARTERY (HCC): ICD-10-CM

## 2020-05-12 DIAGNOSIS — I10 ESSENTIAL HYPERTENSION: ICD-10-CM

## 2020-05-12 DIAGNOSIS — Q21.12 PFO (PATENT FORAMEN OVALE): Primary | ICD-10-CM

## 2020-05-12 RX ORDER — ATORVASTATIN CALCIUM 80 MG/1
80 TABLET, FILM COATED ORAL DAILY
COMMUNITY
End: 2020-06-26 | Stop reason: SDUPTHER

## 2020-05-12 RX ORDER — GUAIFENESIN 100 MG/5ML
81 LIQUID (ML) ORAL DAILY
COMMUNITY

## 2020-05-12 NOTE — PROGRESS NOTES
[facility] [place]: Austin Funk  [phone]          Cardiology Consult/Progress Note        VIRTUAL VISIT DOCUMENTATION     Pursuant to the emergency declaration under the [facility], [facility] waiver authority and the [employer] Act, this Virtual  Visit was conducted, with patient's consent, to reduce the patient's risk of exposure to COVID-19 and provide continuity of care for an established patient. Services were provided through a video synchronous discussion virtually to substitute for in-person clinic visit. Consent: Galina Lara  who was seen by synchronous (real-time) audio-video technology, and/or her healthcare decision maker, is aware that this patient-initiated, Telehealth encounter on 5/12/2020  is a billable service, with coverage as determined by her insurance carrier. female  is aware that she may receive a bill and has provided verbal consent to proceed: Yes. Requesting/referring provider: Orestes Goodman  Reason for Consult: Stroke/PFO    HPI: Galina Lara, a 63y.o. year-old who presents for evaluation of PFO in the setting of stroke. Ms. Olena Heredia has history of hypertension, diabetes, tobacco abuse. She recently suffered a stroke involving left hemiparesis, numbness on the face and speech disturbance. She subsequently has improved from her stroke. As a part of her stroke work-up she underwent evaluation for extra carotid disease. 3 days ago she reported an incidence of transient hand and face numbness which lasted for less than a minute. Otherwise she does not report any recurrent symptoms. She also has prior history of chest pain and hypertension for which she has seen Dr. Noemy Oro and continues to do well        Investigations personally reviewed by me: While she has moderate bilateral carotid stenosis no critical lesion was identified.   Her LV systolic function is normal.  30-day event monitor demonstrated no atrial fibrillation burden. Echocardiogram demonstrated intracardiac atrial level shunt likely from a PFO. Atrial septal aneurysm was also noted. Assessment/Plan:  1. PFO/atrial level intracardiac shunt with no evidence of right atrial or ventricular enlargement  2. Recent stroke with MRI suggestive of possible embolic etiology involving the left frontal and middle lobes  3. Hypertension  4. Diabetes mellitus  5. History of tobacco abuse recently quit after 30-pack-year smoking when she had a stroke    Mrs. Noemy Cedeno was  evaluated at the request of Dr. Nora Aguirre for recent stroke and a PFO. She has multiple potential causes for stroke including hypertension diabetes and tobacco abuse in addition to likely PFO. She will benefit from management of all these causes. She has already quit smoking. She is on high intensity statin therapy. I will defer the management of her diabetes mellitus to her primary care physician. Blood pressures are now well controlled on medication. No clear evidence of atrial fibrillation has been found on extended rhythm monitoring. Her rope score is 4. I informed her that the maximum stroke reduction benefit would be provided by controlling hypertension, diabetes and cessation of smoking. Benefit of PFO closure in patients with low to intermediate rope score is uncertain. Finally cumulative benefits of PFO closure in such cases are long-term and not short-term. She expressed her willingness to attempt any therapy that may provide any kind of stroke risk reduction and would like to proceed with closure if anatomically suitable. I will set her up for a KRIS to identify anatomy of her PFO, suitability for percutaneous closure in the relative risks and benefits of closure.   []    High complexity decision making was performed  []    Patient is at high-risk of decompensation with multiple organ involvement  She  has a past medical history of Chronic obstructive pulmonary disease ([facility] Diabetes ([facility] [address] [facility] Hypercholesteremia, Hypertension, Stroke ([facility] [facility] and Thyroid disease. Review of system:Patient reports no dyspnea/PND/Orthpnea/CP. She reports no cough/fever/abdominal pain. All other systems negative except as above. Family History   Problem Relation Age of Onset    Stroke Neg Hx       Social History     Socioeconomic History    Marital status: SINGLE     Spouse name: Not on file    Number of children: Not on file    Years of education: Not on file    Highest education level: Not on file   Tobacco Use    Smoking status: Current Every Day Smoker     Packs/day: 1.00    Smokeless tobacco: Never Used   Substance and Sexual Activity    Alcohol use: Never     Frequency: Never    Drug use: Never      PE  Vitals:    05/12/20 1321   BP: 110/84   Pulse: 93   Weight: 130 lb (59 kg)   Height: 5' (1.524 m)    Body mass index is 25.39 kg/m². PHYSICAL EXAMINATION      Due to this being a TeleHealth evaluation, many elements of the physical examination are unable to be assessed. General: Well developed, in no acute distress, cooperative and alert  HEENT: Pupils equal/round. No marked JVD visible on video. Respiratory: No audible wheezing, no signs of respiratory distress, lips non cyanotic  Extremities:  No edema  Neuro: A&Ox3, speech clear, no facial droop, answering questions appropriately  Skin: Skin color is normal. No rashes or lesions.  Non diaphoretic on visible skin during exam                                            Recent Labs:  Lab Results   Component Value Date/Time    Cholesterol, total 186 04/05/2020 03:03 AM    HDL Cholesterol 43 04/05/2020 03:03 AM    LDL, calculated 117.8 (H) 04/05/2020 03:03 AM    Triglyceride 126 04/05/2020 03:03 AM    CHOL/HDL Ratio 4.3 04/05/2020 03:03 AM     Lab Results   Component Value Date/Time    Creatinine 0.74 04/03/2020 12:04 PM     Lab Results   Component Value Date/Time    BUN 13 04/03/2020 12:04 PM     Lab Results   Component Value Date/Time    Potassium 4.4 04/03/2020 12:04 PM     Lab Results   Component Value Date/Time    Hemoglobin A1c 5.6 04/03/2020 12:04 PM     Lab Results   Component Value Date/Time    HGB 15.5 04/03/2020 12:04 PM     Lab Results   Component Value Date/Time    PLATELET 548 52/59/7667 12:04 PM       Reviewed:  Past Medical History:   Diagnosis Date    [facility] pulmonary disease ([facility]     Diabetes ([facility]     Borderline    Hypercholesteremia     Hypertension     Stroke ([facility]     Thyroid disease      Social History     Tobacco Use   Smoking Status Current Every Day Smoker    Packs/day: 1.00   Smokeless Tobacco Never Used     Social History     Substance and Sexual Activity   Alcohol Use Never    Frequency: Never     No Known Allergies  Family History   Problem Relation Age of Onset    Stroke Neg Hx         Current Outpatient Medications   Medication Sig    atorvastatin (LIPITOR) 80 mg tablet Take 80 mg by mouth daily.  aspirin 81 mg chewable tablet Take 81 mg by mouth daily.  metoprolol succinate (TOPROL-XL) 25 mg XL tablet Take 1 Tab by mouth nightly. Indications: reduce PVCs    PARoxetine (PaxiL) 20 mg tablet Take 20 mg by mouth daily.  budesonide-formoteroL (Symbicort) 160-4.5 mcg/actuation HFAA Take 2 Puffs by inhalation two (2) times a day.  levothyroxine (SYNTHROID) 200 mcg tablet Take 200 mcg by mouth Daily (before breakfast).  risedronate (ActoneL) 35 mg tablet Take 35 mg by mouth every seven (7) days.  albuterol (PROVENTIL HFA, VENTOLIN HFA, PROAIR HFA) 90 mcg/actuation inhaler Take 2 Puffs by inhalation every six (6) hours as needed for Wheezing. No current facility-administered medications for this visit.         Bessy Brown MD05/12/20 There are other unrelated non-urgent complaints, but due to the busy schedule and the amount of time I've already spent with her, time does not permit me to address these routine issues at today's visit. I've requested another appointment to review these additional issues. ATTENTION:   This medical record was transcribed using an electronic medical records/speech recognition system. Although proofread, it may and can contain electronic, spelling and other errors. Corrections may be executed at a later time. Please feel free to contact us for any clarifications as needed.     [employer] [facility] [place]  [address], [address], [address] [place] [address]

## 2020-05-13 ENCOUNTER — TELEPHONE (OUTPATIENT)
Dept: CARDIOLOGY CLINIC | Age: 60
End: 2020-05-13

## 2020-05-13 ENCOUNTER — DOCUMENTATION ONLY (OUTPATIENT)
Dept: CARDIOLOGY CLINIC | Age: 60
End: 2020-05-13

## 2020-05-13 NOTE — PROGRESS NOTES
Received serious loop alert      5/13/20   9:50am   Auto Trigger   Sinus Rhythm/Sinus Tachycardia/Couplet PVCs/MF PVCs (26 in 1 min) rates up to 101 bpm      Informed Dr Vince Oh

## 2020-05-13 NOTE — TELEPHONE ENCOUNTER
Returned call to patient. Two patient indentifiers verified. Pt was scheduled for KRIS. Pt given instructions over the phone. Pt to have pre op COIVD test completed on Saturday. Pt has been self-quarantine since April 4th per patient. Patient denies any symptoms. Pt to call back if she has any questions.

## 2020-05-13 NOTE — TELEPHONE ENCOUNTER
Attempted to call patient. Unable to leave a voicemail due to mailbox being full. Calling to schedule KRIS with Dr. Laith Carr. I have tentatively scheduled fro next Thursday 5/21/20 @ 1 pm. Awaiting a call back from medical services and the patient.

## 2020-05-14 DIAGNOSIS — Z01.812 PRE-PROCEDURE LAB EXAM: Primary | ICD-10-CM

## 2020-05-15 ENCOUNTER — DOCUMENTATION ONLY (OUTPATIENT)
Dept: CARDIOLOGY CLINIC | Age: 60
End: 2020-05-15

## 2020-05-15 NOTE — TELEPHONE ENCOUNTER
Returned call to patient. Two patient indentifiers verified. Pt was informed that she doesn't need an appointment and they have already been notified. Pt verbalized understanding and denies any further questions.

## 2020-05-15 NOTE — PROGRESS NOTES
Received serious loop alerts      5/13/20   6:22pm   Auto Trigger   Sinus Rhythm w/Run of VT (4 beats)/Couplet PVCs/MF PVCs (23 in 1 min)/PACs/Artifact rates up to 151 bpm    5/13/20   7:09pm   Auto Trigger   Sinus Rhythm w/Run of VT (2 & 3 beats)/Couplet PVCs/MF PVCs (29 in 1 min)/PACs rates up to 134 bpm    5/13/20   10:19pm   Auto Trigger   Sinus Tachycardia/Couplet PVCs/PVCs (31 in 1 min)/PACs/Artifact rates up to 120 bpm      Informed Dr Vince Oh

## 2020-05-15 NOTE — TELEPHONE ENCOUNTER
Pt is calling asking if she needs an appt to go to [facility] [address] [facility] please call her at [phone]

## 2020-05-16 ENCOUNTER — OFFICE VISIT (OUTPATIENT)
Dept: PRIMARY CARE CLINIC | Age: 60
End: 2020-05-16

## 2020-05-16 DIAGNOSIS — Z01.818 PREOP TESTING: Primary | ICD-10-CM

## 2020-05-16 DIAGNOSIS — Q21.12 PFO (PATENT FORAMEN OVALE): ICD-10-CM

## 2020-05-16 NOTE — PROGRESS NOTES
[facility]  Flu Clinic Note      Subjective:   Kamala Solraes is a 63y.o. year old female who presents to flu clinic for evaluation of the following:    See scanned note for details. Preop Testing  Needs COVID test before cardiac procedure planned 5/21/2020  Denies current viral symptoms. Review of Systems   Pertinent positives and negative per HPI. All other systems  reviewed are negative for a Comprehensive ROS (10+).        Past Medical History:   Diagnosis Date    Chronic obstructive pulmonary disease ([facility]     Diabetes ([facility]     Borderline    Hypercholesteremia     Hypertension     Stroke ([facility]     Thyroid disease         Social History     Socioeconomic History    Marital status: SINGLE     Spouse name: Not on file    Number of children: Not on file    Years of education: Not on file    Highest education level: Not on file   Occupational History    Not on file   Social Needs    Financial resource strain: Not on file    Food insecurity     Worry: Not on file     Inability: Not on file    Transportation needs     Medical: Not on file     Non-medical: Not on file   Tobacco Use    Smoking status: Current Every Day Smoker     Packs/day: 1.00    Smokeless tobacco: Never Used   Substance and Sexual Activity    Alcohol use: Never     Frequency: Never    Drug use: Never    Sexual activity: Not on file   Lifestyle    Physical activity     Days per week: Not on file     Minutes per session: Not on file    Stress: Not on file   Relationships    Social connections     Talks on phone: Not on file     Gets together: Not on file     Attends Jew service: Not on file     Active member of club or organization: Not on file     Attends meetings of clubs or organizations: Not on file     Relationship status: Not on file    Intimate partner violence     Fear of current or ex partner: Not on file     Emotionally abused: Not on file     Physically abused: Not on file Forced sexual activity: Not on file   Other Topics Concern    Not on file   Social History Narrative    Not on file           Objective:   See scanned note for vitals. Physical Examination:  General: Alert, cooperative, no distress, appears stated age. Eyes: Conjunctivae clear. Pupils equally round. Extraocular muscles intact. Ears: Normal appearing external ear   Nose: Nares normal appearing  Mouth/Throat: Lips, mucosa, and tongue normal. Moist mucous membranes. No tonsillar enlargement noted. Neck: Supple, symmetrical, trachea midline, no neck mass visualized. Respiratory: Breathing comfortably, in no acute respiratory distress. Cardiovascular: Visualized extremities without edema. MSK: Upper extremities normal appearing. Skin: Skin color, texture, turgor normal. No rashes or lesions on exposed skin. Neurologic: No facial asymmetry. Normal gaze  Psychiatric: Affect appropriate. Thoughts logical. Speech volume and speed normal. No hallucinations. Well kempt.        Ancillary Procedure on 05/01/2020   Component Date Value Ref Range Status    Target HR 05/01/2020 162  bpm Final    Baseline HR 05/01/2020 71  bpm Final    Baseline BP 05/01/2020 122  mmHg Final    O2 sat rest 05/01/2020 97  % Final    Percent HR 05/01/2020 62  % Final    Post peak HR 05/01/2020 101  bpm Final    O2 sat peak 05/01/2020 98  % Final    Stress Base Diastolic BP 52/41/9128 80  mmHg Final    Stress Base Systolic BP 17/93/6267 865  mmHg Final    Stress Base Diastolic BP 95/40/8758 78  mmHg Final    Stress Rate Pressure Product 05/01/2020 10,908  bpm*mmHg Final    ST Elevation (mm) 05/01/2020 0  mm Final    ST Depression (mm) 05/01/2020 0  mm Final   Admission on 04/03/2020, Discharged on 04/05/2020   Component Date Value Ref Range Status    Ventricular Rate 04/03/2020 77  BPM Final    Atrial Rate 04/03/2020 77  BPM Final    P-R Interval 04/03/2020 118  ms Final    QRS Duration 04/03/2020 86  ms Final    Q-T Interval 04/03/2020 420  ms Final    QTC Calculation (Bezet) 04/03/2020 475  ms Final    Calculated P Axis 04/03/2020 60  degrees Final    Calculated R Axis 04/03/2020 23  degrees Final    Calculated T Axis 04/03/2020 58  degrees Final    Diagnosis 04/03/2020    Final                    Value:Sinus rhythm with sinus arrhythmia with occasional premature ventricular   complexes  Right atrial enlargement  Borderline ECG  No previous ECGs available  Confirmed by [id number], Leonardo CARLISLE, Stanley ([id number]) on 4/4/2020 6:28:51 PM      WBC 04/03/2020 8.6  3.6 - 11.0 K/uL Final    RBC 04/03/2020 4.78  3.80 - 5.20 M/uL Final    HGB 04/03/2020 15.5  11.5 - 16.0 g/dL Final    HCT 04/03/2020 46.8  35.0 - 47.0 % Final    MCV 04/03/2020 97.9  80.0 - 99.0 FL Final    MCH 04/03/2020 32.4  26.0 - 34.0 PG Final    MCHC 04/03/2020 33.1  30.0 - 36.5 g/dL Final    RDW 04/03/2020 13.4  11.5 - 14.5 % Final    PLATELET 64/46/0252 775  150 - 400 K/uL Final    MPV 04/03/2020 8.8* 8.9 - 12.9 FL Final    NRBC 04/03/2020 0.0  0  WBC Final    ABSOLUTE NRBC 04/03/2020 0.00  0.00 - 0.01 K/uL Final    NEUTROPHILS 04/03/2020 62  32 - 75 % Final    LYMPHOCYTES 04/03/2020 29  12 - 49 % Final    MONOCYTES 04/03/2020 8  5 - 13 % Final    EOSINOPHILS 04/03/2020 1  0 - 7 % Final    BASOPHILS 04/03/2020 0  0 - 1 % Final    IMMATURE GRANULOCYTES 04/03/2020 0  0.0 - 0.5 % Final    ABS. NEUTROPHILS 04/03/2020 5.3  1.8 - 8.0 K/UL Final    ABS. LYMPHOCYTES 04/03/2020 2.5  0.8 - 3.5 K/UL Final    ABS. MONOCYTES 04/03/2020 0.7  0.0 - 1.0 K/UL Final    ABS. EOSINOPHILS 04/03/2020 0.1  0.0 - 0.4 K/UL Final    ABS. BASOPHILS 04/03/2020 0.0  0.0 - 0.1 K/UL Final    ABS. IMM.  GRANS. 04/03/2020 0.0  0.00 - 0.04 K/UL Final    DF 04/03/2020 AUTOMATED    Final    Sodium 04/03/2020 138  136 - 145 mmol/L Final    Potassium 04/03/2020 4.4  3.5 - 5.1 mmol/L Final    Chloride 04/03/2020 110* 97 - 108 mmol/L Final    CO2 04/03/2020 22  21 - 32 mmol/L Final    Anion gap 04/03/2020 6  5 - 15 mmol/L Final    Glucose 04/03/2020 111* 65 - 100 mg/dL Final    BUN 04/03/2020 13  6 - 20 MG/DL Final    Creatinine 04/03/2020 0.74  0.55 - 1.02 MG/DL Final    BUN/Creatinine ratio 04/03/2020 18  12 - 20   Final    GFR est AA 04/03/2020 >60  >60 ml/min/1.73m2 Final    GFR est non-AA 04/03/2020 >60  >60 ml/min/1.73m2 Final    Comment: Estimated GFR is calculated using the IDMS-traceable Modification of Diet in Renal Disease (MDRD) Study equation, reported for both  Americans (GFRAA) and non- Americans (GFRNA), and normalized to 1.73m2 body surface area. The physician must decide which value applies to the patient. The MDRD study equation should only be used in individuals age 25 or older. It has not been validated for the following: pregnant women, patients with serious comorbid conditions, or on certain medications, or persons with extremes of body size, muscle mass, or nutritional status.  Calcium 04/03/2020 9.2  8.5 - 10.1 MG/DL Final    Bilirubin, total 04/03/2020 0.2  0.2 - 1.0 MG/DL Final    ALT (SGPT) 04/03/2020 20  12 - 78 U/L Final    AST (SGOT) 04/03/2020 9* 15 - 37 U/L Final    Alk. phosphatase 04/03/2020 79  45 - 117 U/L Final    Protein, total 04/03/2020 7.6  6.4 - 8.2 g/dL Final    Albumin 04/03/2020 3.8  3.5 - 5.0 g/dL Final    Globulin 04/03/2020 3.8  2.0 - 4.0 g/dL Final    A-G Ratio 04/03/2020 1.0* 1.1 - 2.2   Final    Troponin-I, Qt. 04/03/2020 <0.05  <0.05 ng/mL Final    Comment: The presence of detectable troponin above the reference range indicates myocardial injury which may be due to ischemia, myocarditis, trauma, etc.  Clinical correlation is necessary to establish the significance of this finding. Sequential testing is recommended to determine if the typical rise and fall of cTnI is demonstrated.   Note:  Cardiac troponin I has a relatively long half life and may be present well after the CK MB has returned to baseline. The reference range is based on the 99th percentile of the referent population.  Color 04/03/2020 YELLOW/STRAW    Final    Color Reference Range: Straw, Yellow or Dark Yellow    Appearance 04/03/2020 CLEAR  CLEAR   Final    Specific gravity 04/03/2020 1.030  1.003 - 1.030   Final    pH (UA) 04/03/2020 7.0  5.0 - 8.0   Final    Protein 04/03/2020 NEGATIVE   NEG mg/dL Final    Glucose 04/03/2020 NEGATIVE   NEG mg/dL Final    Ketone 04/03/2020 NEGATIVE   NEG mg/dL Final    Bilirubin 04/03/2020 NEGATIVE   NEG   Final    Blood 04/03/2020 TRACE* NEG   Final    Urobilinogen 04/03/2020 0.2  0.2 - 1.0 EU/dL Final    Nitrites 04/03/2020 NEGATIVE   NEG   Final    Leukocyte Esterase 04/03/2020 NEGATIVE   NEG   Final    WBC 04/03/2020 0-4  0 - 4 /hpf Final    RBC 04/03/2020 0-5  0 - 5 /hpf Final    Epithelial cells 04/03/2020 FEW  FEW /lpf Final    Epithelial cell category consists of squamous cells and /or transitional urothelial cells. Renal tubular cells, if present, are separately identified as such.  Bacteria 04/03/2020 NEGATIVE   NEG /hpf Final    Hyaline cast 04/03/2020 0-2  0 - 5 /lpf Final    SAMPLES BEING HELD 04/03/2020 1RD,1SST,1BL   Final    COMMENT 04/03/2020 Add-on orders for these samples will be processed based on acceptable specimen integrity and analyte stability, which may vary by analyte. Final    Urine culture hold 04/03/2020 Urine on hold in Microbiology dept for 2 days. If unpreserved urine is submitted, it cannot be used for addtional testing after 24 hours, recollection will be required.     Final    Hemoglobin A1c 04/03/2020 5.6  4.0 - 5.6 % Final    Comment: NEW METHOD  PLEASE NOTE NEW REFERENCE RANGE  (NOTE)  HbA1C Interpretive Ranges  <5.7              Normal  5.7 - 6.4         Consider Prediabetes  >6.5              Consider Diabetes      Est. average glucose 04/03/2020 114  mg/dL Final    AMPHETAMINES 04/03/2020 NEGATIVE   NEG   Final    BARBITURATES 04/03/2020 NEGATIVE   NEG   Final    BENZODIAZEPINES 04/03/2020 NEGATIVE   NEG   Final    COCAINE 04/03/2020 NEGATIVE   NEG   Final    METHADONE 04/03/2020 NEGATIVE   NEG   Final    OPIATES 04/03/2020 NEGATIVE   NEG   Final    PCP(PHENCYCLIDINE) 04/03/2020 NEGATIVE   NEG   Final    THC (TH-CANNABINOL) 04/03/2020 NEGATIVE   NEG   Final    Drug screen comment 04/03/2020 (NOTE)   Final    Comment: This test is a screen for drugs of abuse in a medical setting only   (i.e., they are unconfirmed results and as such must not be used for   non-medical purposes e.g., employment testing, legal testing). Due to   its inherent nature, false positive (FP) and false negative (FN)   results may be obtained. Therefore, if necessary for medical care,   recommend confirmation of positive findings by GC/MS. The cutoff   values (i.e., the level at which this screening test becomes positive   for a given drug group) are:    Amphetamine/Methamphetamine: 300 ng/mL  Barbiturates:                200 ng/mL  Benzodiazepines:             200 ng/mL  Cocaine:                     150 ng/mL  Methadone:                   300 ng/mL  Opiates:                     300 ng/mL   Phencyclidine, PCP:           25 ng/mL  Marijuana, THC:               50 ng/mL    This screening test can identify the presence of the following drugs   when above the cutoff value; see list posted on the intranet. It can   be viewed by carson                           ecting in sequence the following from the [address] home   page: [place]; [address] [place] , Resources; [facility], Physician Resources Q to Z; \"UDS (Urine Drug Screen   Automated) List of Detectable Drugs. \"     Or use web address:   [email or web address]  UDS%20List%20of%20Detectable%20Drugs. pdf      TSH 04/03/2020 0.80  0.36 - 3.74 uIU/mL Final    Comment:      Due to TSH heterogeneity, both structurally and degree of glycosylation, monoclonal antibodies used in the TSH assay may not accurately quantitate TSH.  Therefore, this result should be correlated with clinical findings as well as with other assessments of thyroid function, e.g., free T4, free T3.      Free Triiodothyronine (T3) 04/03/2020 3.4  2.2 - 4.0 pg/mL Final    T4, Free 04/03/2020 1.4  0.8 - 1.5 NG/DL Final    Aortic Valve Systolic Peak Velocity 84/38/0053 128.13  cm/s Final    Aortic Valve Area by Continuity of* 04/03/2020 1.9  cm2 Final    AoV PG 04/03/2020 6.6  mmHg Final    LVIDd 04/03/2020 4.16  3.9 - 5.3 cm Final    LVPWd 04/03/2020 0.68  0.6 - 0.9 cm Final    LVIDs 04/03/2020 2.99  cm Final    IVSd 04/03/2020 0.82  0.6 - 0.9 cm Final    LVOT d 04/03/2020 2.01  cm Final    LVOT Peak Velocity 04/03/2020 75.14  cm/s Final    LVOT Peak Gradient 04/03/2020 2.3  mmHg Final    MV A Fadi 04/03/2020 91.74  cm/s Final    MV E Fadi 04/03/2020 68.84  cm/s Final    MV E/A 04/03/2020 0.75   Final    Left Atrium to Aortic Root Ratio 04/03/2020 0.86   Final    RVIDd 04/03/2020 3.01  cm Final    LA Vol 4C 04/03/2020 25.82  22 - 52 mL Final    LA Vol 2C 04/03/2020 36.59  22 - 52 mL Final    LV Mass AL 04/03/2020 100.2  67 - 162 g Final    LV Mass AL Index 04/03/2020 64.5  43 - 95 g/m2 Final    RVSP 04/03/2020 29.3  mmHg Final    Est. RA Pressure 04/03/2020 3.0  mmHg Final    Mitral Regurgitant Peak Velocity 04/03/2020 284.82  cm/s Final    Mitral Valve E Wave Deceleration T* 04/03/2020 254.2  ms Final    Left Atrium Major Axis 04/03/2020 2.93  cm Final    Triscuspid Valve Regurgitation Pea* 04/03/2020 26.3  mmHg Final    Pulmonic Valve Max Velocity 04/03/2020 68.34  cm/s Final    TR Max Velocity 04/03/2020 256.57  cm/s Final    PASP 04/03/2020 29.3  mmHg Final    LA Vol Index 04/03/2020 23.54  16 - 28 ml/m2 Final    LA Vol Index 04/03/2020 16.61  16 - 28 ml/m2 Final    MR Peak Gradient 04/03/2020 32.4  mmHg Final    Left Ventricular Fractional [place]* 04/03/2020 34.840988018  % Final    PV End Diastolic Velocity 72/65/8766 1.0  mmHg Final    Mitral Valve Deceleration [place] 04/03/2020 2.0065828030   Final    AV Velocity Ratio 04/03/2020 0.59   Final    Pulmonary Artery Mean Presure 04/03/2020 14.3  mmHg Final    PI End Diastolic Pressure 79/91/7019 6.7  mmHg Final    PV peak gradient 04/03/2020 1.9  mmHg Final    LA Volume 04/03/2020 34.49  22 - 52 mL Final    Ao Root D 04/03/2020 3.40  cm Final    LA Vol Index 04/03/2020 22.19  16 - 28 ml/m2 Final    LIPID PROFILE 04/04/2020        Final    Cholesterol, total 04/04/2020 209* <200 MG/DL Final    Triglyceride 04/04/2020 141  <150 MG/DL Final    Based on NCEP-ATP III:  Triglycerides <150 mg/dL  is considered normal, 150-199 mg/dL  borderline high,  200-499 mg/dL high and  greater than or equal to 500 mg/dL very high.  HDL Cholesterol 04/04/2020 48  MG/DL Final    Based on NCEP ATP III, HDL Cholesterol <40 mg/dL is considered low and >60 mg/dL is elevated.  LDL, calculated 04/04/2020 132.8* 0 - 100 MG/DL Final    Comment: Based on the NCEP-ATP: LDL-C concentrations are considered  optimal <100 mg/dL,  near optimal/above Normal 100-129 mg/dL  Borderline High: 130-159, High: 160-189 mg/dL  Very High: Greater than or equal to 190 mg/dL      VLDL, calculated 04/04/2020 28.2  MG/DL Final    CHOL/HDL Ratio 04/04/2020 4.4  0.0 - 5.0   Final    Protein S-Functional 04/04/2020 114  63 - 140 % Final    Comment: (NOTE)  Protein S activity may be falsely increased (masking an abnormal, low  result) in patients receiving direct Xa inhibitor (e.g.,  rivaroxaban, apixaban, edoxaban) or a direct thrombin inhibitor  (e.g., dabigatran) anticoagulant treatment due to assay interference  by these drugs.   Performed At: [address] [id number]  Rosan Bence MD WX:[id number]      Factor II Activity 04/04/2020 109  50 - 154 % Final    Comment: (NOTE)  Performed At: [facility] [address] [id number]  Vale Hernadez MD QK:[id number]      PTT-LA 04/04/2020 38.0  0.0 - 51.9 sec Final    dRVVT 04/04/2020 35.3  0.0 - 47.0 sec Final    Comment: (NOTE)  Performed At: [address] [id number]  Vale Hernadez MD YL:[id number]      Interpretation 04/04/2020 Comment:   Final    Comment: (NOTE)  No lupus anticoagulant was detected. Performed At: [address] [id number]  Vale Hernadez MD UE:[id number]      Antithrombin Activity 04/04/2020 88  75 - 135 % Final    Comment: (NOTE)  Direct Xa inhibitor anticoagulants such as rivaroxaban, apixaban and  edoxaban will lead to spuriously elevated antithrombin activity  levels possibly masking a deficiency. Performed At: [address] [id number]  Vale Hernadez MD OR:[id number]      Protein C-Functional 04/04/2020 128  73 - 180 % Final    Comment: (NOTE)  Performed At: [address] [id number]  Vale Hernadez MD IS:[id number]      Factor V Leiden 04/04/2020 Comment    Final    Comment: (NOTE)  Result:  Negative (no mutation found)  Factor V Leiden is a specific mutation ([id number]) in the factor  V gene that is associated with an increased risk of venous  thrombosis. Factor V Leiden is more resistant to  inactivation by activated protein C. As a result, factor V  persists in the circulation leading to a mild hyper-  coagulable state. The Leiden mutation accounts for 90% -  95% of APC resistance. Factor V Leiden has been reported in  patients with deep vein thrombosis, pulmonary embolus,  central retinal vein occlusion, cerebral sinus thrombosis  and hepatic vein thrombosis.  Other risk factors to be  considered in the workup for venous thrombosis include the  [id number] mutation in the factor II (prothrombin) gene,  protein S and C deficiency, and antithrombin deficiencies. Anticardiolipin antibody and lupus anticoagulant analysis  may be appropriate for certain patients, as well as  homocysteine levels. Contact your local [facility] for information on how to order  additi                           onal testing if desired. **Genetic counselors are available for health care providers to**   discuss results at [phone] ([phone]). Methodology:  DNA analysis of the Factor V gene was performed by allele-specific  PCR. The diagnostic sensitivity and specificity is >99% for both. Molecular-based testing is highly accurate, but as in any  laboratory test, diagnostic errors may occur. All test results must  be combined with clinical information for the most accurate  interpretation. This test was developed and its performance characteristics  determined by [facility]. It has not been cleared or approved by the  Food and Drug Administration. References:  Bereket BISHOP (1996). Clin Lab Med 58:518-311.   Saul Waters, PhD, Chai Deleon, PhD, Yann Mtz M.S., PhD, Yamile Henderson, PhD, Nadia Aleman, PhD, Rashida Santana PhD, [facility], [facility].  Performed At: [facility] [address]  [address], [place] [id number]  Mikala Moncada MD KQ:[id number]                           7      Cardiolipin Ab, IgG 04/04/2020 <9  0 - 14 GPL U/mL Final    Comment: (NOTE)                           Negative:              <15                           Indeterminate:     15 - 20                           Low-Med Positive: >20 - 80                           High Positive:         >80  Performed At: [address] [id number]  Vale Hernadez MD BP:[id number]      Cardiolipin Ab, IgM 04/04/2020 <9  0 - 12 MPL U/mL Final    Comment: (NOTE)                           Negative:              <13                           Indeterminate:     13 - 20                           Low-Med Positive: >20 - 80                           High Positive:         >80  Performed At: [address] [id number]  Kapil THURMAN:[id number]      Vitamin B12 04/04/2020 327  193 - 986 pg/mL Final    Folate 04/04/2020 14.5  5.0 - 21.0 ng/mL Final    Homocysteine, plasma 04/04/2020 7.7  3.7 - 13.9 umol/L Final    LIPID PROFILE 04/05/2020        Final    Cholesterol, total 04/05/2020 186  <200 MG/DL Final    Triglyceride 04/05/2020 126  <150 MG/DL Final    Based on NCEP-ATP III:  Triglycerides <150 mg/dL  is considered normal, 150-199 mg/dL  borderline high,  200-499 mg/dL high and  greater than or equal to 500 mg/dL very high.  HDL Cholesterol 04/05/2020 43  MG/DL Final    Based on NCEP ATP III, HDL Cholesterol <40 mg/dL is considered low and >60 mg/dL is elevated.  LDL, calculated 04/05/2020 117.8* 0 - 100 MG/DL Final    Comment: Based on the NCEP-ATP: LDL-C concentrations are considered  optimal <100 mg/dL,  near optimal/above Normal 100-129 mg/dL  Borderline High: 130-159, High: 160-189 mg/dL  Very High: Greater than or equal to 190 mg/dL      VLDL, calculated 04/05/2020 25.2  MG/DL Final    CHOL/HDL Ratio 04/05/2020 4.3  0.0 - 5.0   Final         Assessment/ Plan:   Diagnoses and all orders for this visit:    1. Preop testing  -     NOVEL CORONAVIRUS (COVID-19); Future      Preop COVID test done   Provided education about    Educated patient on red flag symptoms to warrant return to clinic or emergency room visit. I have discussed the diagnosis with the patient and the intended plan as seen in the above orders. The patient has been offered or received an after-visit summary and questions were answered concerning future plans. I have discussed medication side effects and warnings with the patient as well. Follow-up and Dispositions    · Return if symptoms worsen or fail to improve.          Signed,    River Ko MD  5/16/2020

## 2020-05-16 NOTE — PATIENT INSTRUCTIONS
Learning About Coronavirus [phone]  Coronavirus [phone]): Overview  What is coronavirus (COVID-19)? The coronavirus disease (COVID-19) is caused by a virus. It is an illness that was first found in [place], [place], in December 2019. It has since spread worldwide. The virus can cause fever, cough, and trouble breathing. In severe cases, it can cause pneumonia and make it hard to breathe without help. It can cause death. Coronaviruses are a large group of viruses. They cause the common cold. They also cause more serious illnesses like Middle East respiratory syndrome (MERS) and severe acute respiratory syndrome (SARS). COVID-19 is caused by a novel coronavirus. That means it's a new type that has not been seen in people before. This virus spreads person-to-person through droplets from coughing and sneezing. It can also spread when you are close to someone who is infected. And it can spread when you touch something that has the virus on it, such as a doorknob or a tabletop. What can you do to protect yourself from coronavirus (COVID-19)? The best way to protect yourself from getting sick is to:  · Avoid areas where there is an outbreak. · Avoid contact with people who may be infected. · Wash your hands often with soap or alcohol-based hand sanitizers. · Avoid crowds and try to stay at least 6 feet away from other people. · Wash your hands often, especially after you cough or sneeze. Use soap and water, and scrub for at least 20 seconds. If soap and water aren't available, use an alcohol-based hand . · Avoid touching your mouth, nose, and eyes. What can you do to avoid spreading the virus to others? To help avoid spreading the virus to others:  · Cover your mouth with a tissue when you cough or sneeze. Then throw the tissue in the trash. · Use a disinfectant to clean things that you touch often. · Stay home if you are sick or have been exposed to the virus.  Don't go to school, work, or public areas. And don't use public transportation. · If you are sick:  ? Leave your home only if you need to get medical care. But call the doctor's office first so they know you're coming. And wear a face mask, if you have one.  ? If you have a face mask, wear it whenever you're around other people. It can help stop the spread of the virus when you cough or sneeze. ? Clean and disinfect your home every day. Use household  and disinfectant wipes or sprays. Take special care to clean things that you grab with your hands. These include doorknobs, remote controls, phones, and handles on your refrigerator and microwave. And don't forget countertops, tabletops, bathrooms, and computer keyboards. When to call for help  Call 911 anytime you think you may need emergency care. For example, call if:  · You have severe trouble breathing. (You can't talk at all.)  · You have constant chest pain or pressure. · You are severely dizzy or lightheaded. · You are confused or can't think clearly. · Your face and lips have a blue color. · You pass out (lose consciousness) or are very hard to wake up. Call your doctor now if you develop symptoms such as:  · Shortness of breath. · Fever. · Cough. If you need to get care, call ahead to the doctor's office for instructions before you go. Make sure you wear a face mask, if you have one, to prevent exposing other people to the virus. Where can you get the latest information? The following health organizations are tracking and studying this virus. Their websites contain the most up-to-date information. Wayne Thakkar also learn what to do if you think you may have been exposed to the virus. · U.S. Centers for Disease Control and Prevention (CDC): The CDC provides updated news about the disease and travel advice. The website also tells you how to prevent the spread of infection. www.cdc.gov  · World Health Organization [facility]): WHO offers information about the virus outbreaks.  WHO also has travel advice. www.who.int  Current as of: April 1, 2020               Content Version: 12.4  © 5364-2456 Healthwise, Incorporated. Care instructions adapted under license by your healthcare professional. If you have questions about a medical condition or this instruction, always ask your healthcare professional. [address] any warranty or liability for your use of this information.

## 2020-05-18 ENCOUNTER — DOCUMENTATION ONLY (OUTPATIENT)
Dept: CARDIOLOGY CLINIC | Age: 60
End: 2020-05-18

## 2020-05-18 NOTE — PROGRESS NOTES
Received serious loop alerts from the weekend    5/16/20   3:06am   Auto Trigger   Sinus Rhythm w/Run of VT (3 & 5 beats)/Bigeminal PVCs/MF PVCs (31 in 1 min) rates up to 143 bpm    5/16/20   4:09am   Auto Trigger   Sinus Rhythm w/Run of VT (4 beats)/Bigeminal PVCs/MF PVCs (30 in 1 min) rates up to 167 bpm    5/16/20   7:21pm   Auto Trigger   Sinus Rhythm/Sinus Tachycardia/Bigeminal PVCs/PVCs (16 in 1 min)/Lead Loss rates up to 120 bpm      Informed Dr Ina Parisi

## 2020-05-19 LAB — SARS-COV-2, NAA: NOT DETECTED

## 2020-05-20 ENCOUNTER — DOCUMENTATION ONLY (OUTPATIENT)
Dept: CARDIOLOGY CLINIC | Age: 60
End: 2020-05-20

## 2020-05-20 NOTE — PROGRESS NOTES
Outbound call placed to patient. Name and  verified. Patient reported understanding and appreciative of call.

## 2020-05-20 NOTE — PROGRESS NOTES
Received serious loop alert      5/20/20   4:03am   Auto Trigger   Sinus Rhythm w/VT (3 beats)/Couplet PVCs/Bigeminal PVCs/MF PVCs (24 in 1 min) rates up to 151 bpm      Informed Dr Aashish Grande

## 2020-05-20 NOTE — PROGRESS NOTES
Notify Patient:  The COVID-19 test, also known as novel coronavirus, result was negative  Until your symptoms are fully resolved, you may still be contagious. We recommend that you remain isolated for 7 days minimum or 72 hours after your symptoms have completely resolved, whichever is longer. Continually monitor symptoms. Contact a medical provider if symptoms are worsening.    If you have any additional questions, reach out to your Primary Care Provider or Care Team.  For more information visit the CDC website: DotProtection.gl

## 2020-05-21 ENCOUNTER — HOSPITAL ENCOUNTER (OUTPATIENT)
Dept: NON INVASIVE DIAGNOSTICS | Age: 60
Discharge: HOME | End: 2020-05-21
Attending: INTERNAL MEDICINE
Payer: MEDICAID

## 2020-05-21 ENCOUNTER — ANESTHESIA (OUTPATIENT)
Dept: NON INVASIVE DIAGNOSTICS | Age: 60
End: 2020-05-21
Payer: MEDICAID

## 2020-05-21 ENCOUNTER — ANESTHESIA EVENT (OUTPATIENT)
Dept: NON INVASIVE DIAGNOSTICS | Age: 60
End: 2020-05-21
Payer: MEDICAID

## 2020-05-21 VITALS
RESPIRATION RATE: 22 BRPM | DIASTOLIC BLOOD PRESSURE: 93 MMHG | SYSTOLIC BLOOD PRESSURE: 122 MMHG | HEART RATE: 79 BPM | OXYGEN SATURATION: 97 %

## 2020-05-21 DIAGNOSIS — Q21.11 OSTIUM SECUNDUM TYPE ATRIAL SEPTAL DEFECT: ICD-10-CM

## 2020-05-21 PROCEDURE — 76060000032 HC ANESTHESIA 0.5 TO 1 HR

## 2020-05-21 PROCEDURE — 74011250636 HC RX REV CODE- 250/636: Performed by: NURSE ANESTHETIST, CERTIFIED REGISTERED

## 2020-05-21 PROCEDURE — 93312 ECHO TRANSESOPHAGEAL: CPT

## 2020-05-21 PROCEDURE — 74011000250 HC RX REV CODE- 250: Performed by: NURSE ANESTHETIST, CERTIFIED REGISTERED

## 2020-05-21 RX ORDER — EPHEDRINE SULFATE/0.9% NACL/PF 50 MG/5 ML
SYRINGE (ML) INTRAVENOUS AS NEEDED
Status: DISCONTINUED | OUTPATIENT
Start: 2020-05-21 | End: 2020-05-21 | Stop reason: HOSPADM

## 2020-05-21 RX ORDER — SODIUM CHLORIDE 9 MG/ML
INJECTION, SOLUTION INTRAVENOUS
Status: DISCONTINUED | OUTPATIENT
Start: 2020-05-21 | End: 2020-05-21 | Stop reason: HOSPADM

## 2020-05-21 RX ORDER — PROPOFOL 10 MG/ML
INJECTION, EMULSION INTRAVENOUS AS NEEDED
Status: DISCONTINUED | OUTPATIENT
Start: 2020-05-21 | End: 2020-05-21 | Stop reason: HOSPADM

## 2020-05-21 RX ADMIN — PROPOFOL 30 MG: 10 INJECTION, EMULSION INTRAVENOUS at 13:25

## 2020-05-21 RX ADMIN — Medication 10 MG: at 13:42

## 2020-05-21 RX ADMIN — PROPOFOL 10 MG: 10 INJECTION, EMULSION INTRAVENOUS at 14:06

## 2020-05-21 RX ADMIN — PROPOFOL 30 MG: 10 INJECTION, EMULSION INTRAVENOUS at 13:30

## 2020-05-21 RX ADMIN — PROPOFOL 10 MG: 10 INJECTION, EMULSION INTRAVENOUS at 13:57

## 2020-05-21 RX ADMIN — PROPOFOL 50 MG: 10 INJECTION, EMULSION INTRAVENOUS at 13:24

## 2020-05-21 RX ADMIN — PROPOFOL 20 MG: 10 INJECTION, EMULSION INTRAVENOUS at 14:08

## 2020-05-21 RX ADMIN — SODIUM CHLORIDE: 900 INJECTION, SOLUTION INTRAVENOUS at 13:23

## 2020-05-21 RX ADMIN — PROPOFOL 50 MG: 10 INJECTION, EMULSION INTRAVENOUS at 13:26

## 2020-05-21 RX ADMIN — PHENYLEPHRINE HYDROCHLORIDE 40 MCG: 10 INJECTION INTRAVENOUS at 13:39

## 2020-05-21 RX ADMIN — PROPOFOL 30 MG: 10 INJECTION, EMULSION INTRAVENOUS at 13:35

## 2020-05-21 RX ADMIN — PROPOFOL 20 MG: 10 INJECTION, EMULSION INTRAVENOUS at 13:52

## 2020-05-21 RX ADMIN — PROPOFOL 20 MG: 10 INJECTION, EMULSION INTRAVENOUS at 13:41

## 2020-05-21 RX ADMIN — PROPOFOL 30 MG: 10 INJECTION, EMULSION INTRAVENOUS at 13:29

## 2020-05-21 RX ADMIN — PHENYLEPHRINE HYDROCHLORIDE 80 MCG: 10 INJECTION INTRAVENOUS at 13:54

## 2020-05-21 RX ADMIN — PROPOFOL 30 MG: 10 INJECTION, EMULSION INTRAVENOUS at 13:34

## 2020-05-21 RX ADMIN — PROPOFOL 10 MG: 10 INJECTION, EMULSION INTRAVENOUS at 13:58

## 2020-05-21 RX ADMIN — PROPOFOL 20 MG: 10 INJECTION, EMULSION INTRAVENOUS at 13:45

## 2020-05-21 RX ADMIN — PROPOFOL 30 MG: 10 INJECTION, EMULSION INTRAVENOUS at 13:27

## 2020-05-21 RX ADMIN — PROPOFOL 20 MG: 10 INJECTION, EMULSION INTRAVENOUS at 13:47

## 2020-05-21 RX ADMIN — PROPOFOL 20 MG: 10 INJECTION, EMULSION INTRAVENOUS at 14:05

## 2020-05-21 RX ADMIN — PROPOFOL 20 MG: 10 INJECTION, EMULSION INTRAVENOUS at 13:32

## 2020-05-21 RX ADMIN — PROPOFOL 10 MG: 10 INJECTION, EMULSION INTRAVENOUS at 13:55

## 2020-05-21 RX ADMIN — PROPOFOL 20 MG: 10 INJECTION, EMULSION INTRAVENOUS at 14:04

## 2020-05-21 RX ADMIN — PROPOFOL 20 MG: 10 INJECTION, EMULSION INTRAVENOUS at 13:44

## 2020-05-21 RX ADMIN — PROPOFOL 10 MG: 10 INJECTION, EMULSION INTRAVENOUS at 14:00

## 2020-05-21 RX ADMIN — PROPOFOL 20 MG: 10 INJECTION, EMULSION INTRAVENOUS at 13:43

## 2020-05-21 RX ADMIN — PROPOFOL 20 MG: 10 INJECTION, EMULSION INTRAVENOUS at 13:50

## 2020-05-21 RX ADMIN — PROPOFOL 20 MG: 10 INJECTION, EMULSION INTRAVENOUS at 13:36

## 2020-05-21 RX ADMIN — PROPOFOL 30 MG: 10 INJECTION, EMULSION INTRAVENOUS at 13:28

## 2020-05-21 RX ADMIN — PROPOFOL 20 MG: 10 INJECTION, EMULSION INTRAVENOUS at 13:48

## 2020-05-21 RX ADMIN — PHENYLEPHRINE HYDROCHLORIDE 80 MCG: 10 INJECTION INTRAVENOUS at 13:33

## 2020-05-21 RX ADMIN — PROPOFOL 20 MG: 10 INJECTION, EMULSION INTRAVENOUS at 13:42

## 2020-05-21 RX ADMIN — PROPOFOL 20 MG: 10 INJECTION, EMULSION INTRAVENOUS at 13:46

## 2020-05-21 RX ADMIN — PROPOFOL 20 MG: 10 INJECTION, EMULSION INTRAVENOUS at 13:39

## 2020-05-21 RX ADMIN — SODIUM CHLORIDE: 900 INJECTION, SOLUTION INTRAVENOUS at 13:41

## 2020-05-21 RX ADMIN — PROPOFOL 20 MG: 10 INJECTION, EMULSION INTRAVENOUS at 13:33

## 2020-05-21 RX ADMIN — PROPOFOL 20 MG: 10 INJECTION, EMULSION INTRAVENOUS at 13:54

## 2020-05-21 RX ADMIN — PROPOFOL 20 MG: 10 INJECTION, EMULSION INTRAVENOUS at 13:40

## 2020-05-21 RX ADMIN — PROPOFOL 30 MG: 10 INJECTION, EMULSION INTRAVENOUS at 13:38

## 2020-05-21 RX ADMIN — PROPOFOL 10 MG: 10 INJECTION, EMULSION INTRAVENOUS at 13:56

## 2020-05-21 RX ADMIN — PHENYLEPHRINE HYDROCHLORIDE 80 MCG: 10 INJECTION INTRAVENOUS at 13:48

## 2020-05-21 RX ADMIN — PROPOFOL 10 MG: 10 INJECTION, EMULSION INTRAVENOUS at 14:01

## 2020-05-21 RX ADMIN — Medication 10 MG: at 14:00

## 2020-05-21 RX ADMIN — PROPOFOL 30 MG: 10 INJECTION, EMULSION INTRAVENOUS at 13:31

## 2020-05-21 RX ADMIN — PROPOFOL 20 MG: 10 INJECTION, EMULSION INTRAVENOUS at 13:51

## 2020-05-21 NOTE — PROGRESS NOTES
Anesthesia name:  Summer Chapin    Anesthesia is present for case. Refer to anesthesia log for vitals.

## 2020-05-21 NOTE — PROGRESS NOTES
Patient sedated with assistance of anesthesia. KRIS probe passed without difficulty, images obtained, and probe removed without incident. Patient tolerated procedure well. Please see full note in Cardiology section. Summary:  normal LV systolic function. no evidence of LA thrombus, Small KATHLEEN  No evidence of valvular vegetation. ASD/Complex fossa defect rather than typical PFO in superior fossa with ASA.      (Concern regarding a more inferior defect on 3D, but eventually concluded to be echo washout)

## 2020-05-21 NOTE — ANESTHESIA PREPROCEDURE EVALUATION
Relevant Problems   No relevant active problems       Anesthetic History   No history of anesthetic complications            Review of Systems / Medical History  Patient summary reviewed, nursing notes reviewed and pertinent labs reviewed    Pulmonary    COPD: moderate               Neuro/Psych       CVA       Cardiovascular    Hypertension              Exercise tolerance: >4 METS  Comments: PFO vs secundum defect   GI/Hepatic/Renal  Within defined limits              Endo/Other    Diabetes: type 2  Hypothyroidism: well controlled       Other Findings              Physical Exam    Airway  Mallampati: II  TM Distance: > 6 cm  Neck ROM: normal range of motion   Mouth opening: Normal     Cardiovascular          Murmur: Grade 2, Mitral area     Dental    Dentition: Full upper dentures     Pulmonary  Breath sounds clear to auscultation               Abdominal  GI exam deferred       Other Findings            Anesthetic Plan    ASA: 3  Anesthesia type: general          Induction: Intravenous  Anesthetic plan and risks discussed with: Patient

## 2020-05-21 NOTE — ANESTHESIA POSTPROCEDURE EVALUATION
Post-Anesthesia Evaluation and Assessment    Patient: Garth Simmonds MRN: [id number]  SSN: [id number]    YOB: 1960  Age: 63 y.o. Sex: female      I have evaluated the patient and they are stable and ready for discharge from the PACU. Cardiovascular Function/Vital Signs  Visit Vitals  BP (!) 122/93 (BP 1 Location: Right arm, BP Patient Position: At rest)   Pulse 79   Resp 22   SpO2 97%       Patient is status post * No anesthesia type entered * anesthesia for * No procedures listed *. Nausea/Vomiting: None    Postoperative hydration reviewed and adequate. Pain:  Pain Scale 1: Numeric (0 - 10) (05/21/20 1219)  Pain Intensity 1: 0 (05/21/20 1219)   Managed    Neurological Status: At baseline    Mental Status, Level of Consciousness: Alert and  oriented to person, place, and time    Pulmonary Status:   O2 Device: Room air (05/21/20 1457)   Adequate oxygenation and airway patent    Complications related to anesthesia: None    Post-anesthesia assessment completed. No concerns    Signed By: Jose Shaw MD     May 21, 2020              * No procedures listed *.    general    <BSHSIANPOST>    No vitals data found for the desired time range.

## 2020-05-21 NOTE — PROGRESS NOTES
Anesthesia name:  Dr. Chely Adorno    Anesthesia is present for case. Refer to anesthesia log for vitals.

## 2020-05-22 ENCOUNTER — TELEPHONE (OUTPATIENT)
Dept: CARDIOLOGY CLINIC | Age: 60
End: 2020-05-22

## 2020-05-22 NOTE — TELEPHONE ENCOUNTER
Called patient. Two patient indentifiers verified. Pt was scheduled for PFO closure on 6/5/20 @ 9:30 am. Pt was given instructions over the phone. Pt will have COVID testing completed and self quarantine other then to get COVID testing. Pt verbalized understanding and denies any further questions at this time.      Future Appointments   Date Time Provider Celina Ingram   5/29/2020  3:40 PM Sole Caro MD [facility]

## 2020-05-22 NOTE — TELEPHONE ENCOUNTER
----- Message from Kodak Rosado MD sent at 5/21/2020  3:35 PM EDT -----  Can you set his patient up for PFO closure. Indication PFO/stroke. Hopefulyy early June. She is concerned.

## 2020-05-30 ENCOUNTER — OFFICE VISIT (OUTPATIENT)
Dept: PRIMARY CARE CLINIC | Age: 60
End: 2020-05-30

## 2020-05-30 DIAGNOSIS — Z01.818 PREOP TESTING: Primary | ICD-10-CM

## 2020-05-30 NOTE — PROGRESS NOTES
Patient was seen at [facility] flu clinic. Patient consented to receive care and bill insurance. Please seen scanned note for visit documentation.

## 2020-06-01 ENCOUNTER — VIRTUAL VISIT (OUTPATIENT)
Dept: CARDIOLOGY CLINIC | Age: 60
End: 2020-06-01

## 2020-06-01 DIAGNOSIS — I10 ESSENTIAL HYPERTENSION: ICD-10-CM

## 2020-06-01 DIAGNOSIS — I49.3 MULTIFOCAL PVCS WITH PAIRING: Primary | ICD-10-CM

## 2020-06-01 DIAGNOSIS — E11.9 TYPE 2 DIABETES MELLITUS WITHOUT COMPLICATION, WITHOUT LONG-TERM CURRENT USE OF INSULIN (HCC): ICD-10-CM

## 2020-06-01 DIAGNOSIS — Q21.12 PFO (PATENT FORAMEN OVALE): ICD-10-CM

## 2020-06-01 DIAGNOSIS — E78.2 MIXED HYPERLIPIDEMIA: ICD-10-CM

## 2020-06-01 DIAGNOSIS — J44.9 CHRONIC OBSTRUCTIVE PULMONARY DISEASE, UNSPECIFIED COPD TYPE (HCC): ICD-10-CM

## 2020-06-01 DIAGNOSIS — I63.9 ACUTE CVA (CEREBROVASCULAR ACCIDENT) (HCC): ICD-10-CM

## 2020-06-01 DIAGNOSIS — E78.5 DYSLIPIDEMIA: ICD-10-CM

## 2020-06-01 LAB — SARS-COV-2, NAA: NOT DETECTED

## 2020-06-01 RX ORDER — ALENDRONATE SODIUM 70 MG/1
70 TABLET ORAL
COMMUNITY
End: 2020-06-06

## 2020-06-01 RX ORDER — ARIPIPRAZOLE 10 MG/1
10 TABLET ORAL
COMMUNITY
Start: 2020-05-11 | End: 2021-08-24

## 2020-06-01 NOTE — Clinical Note
Please contact patient this Thursday whether she has experienced significant clinical improvement with holding her metoprolol.

## 2020-06-01 NOTE — PROGRESS NOTES
VIRTUAL VISIT DOCUMENTATION     Pursuant to the emergency declaration under the [facility] [place] [address] waiver authority and the [employer] Act, this Virtual  Visit was conducted, with patient's consent, to reduce the patient's risk of exposure to COVID-19 and provide continuity of care for an established patient. Services were provided through a video synchronous discussion virtually to substitute for in-person clinic visit. HISTORY OF PRESENTING ILLNESS      Galina Lara is a 63 y.o. female who was seen by synchronous (real-time) audio-video technology on 6/1/2020 with symptomatic PVCs associated with palpitations referred for further recommendations regarding management of her PVCs after monitoring demonstrated multiple multifocal PVCs with short runs of nonsustained ventricular tachycardia. Exercise nuclear stress test was unremarkable for ischemia. She has a patent foramen ovale with prominent right to left shunting on bubble study for which she is undergoing consideration for PFO closure with Dr. Austin Funk. She was started on Toprol-XL 25 mg daily for her PVCs however has experienced significant fatigue and dizziness with starting the metoprolol. She does not believe she has experienced clinical benefit with starting the Toprol however.        ACTIVE PROBLEM LIST     Patient Active Problem List    Diagnosis Date Noted    Multifocal PVCs with pairing 05/05/2020    Weakness of right upper extremity 04/05/2020    Hypothyroidism 04/03/2020    Dyslipidemia 04/03/2020    COPD (chronic obstructive pulmonary disease) ([address] 04/03/2020    HTN (hypertension), benign 04/03/2020    Acute CVA (cerebrovascular accident) ([address] 04/03/2020    PFO (patent foramen ovale) 04/03/2020           PAST MEDICAL HISTORY     Past Medical History:   Diagnosis Date    Chronic obstructive pulmonary disease ([address]     Diabetes ([address] Borderline    Hypercholesteremia     Hypertension     Stroke [facility]     Thyroid disease            PAST SURGICAL HISTORY     Past Surgical History:   Procedure Laterality Date    CARDIAC SURG PROCEDURE UNLIST      cath    HX APPENDECTOMY      HX CHOLECYSTECTOMY      HX GYN      hysterectomy          ALLERGIES     No Known Allergies       FAMILY HISTORY     Family History   Problem Relation Age of Onset    Stroke Neg Hx     negative for cardiac disease       SOCIAL HISTORY     Social History     Socioeconomic History    Marital status: SINGLE     Spouse name: Not on file    Number of children: Not on file    Years of education: Not on file    Highest education level: Not on file   Tobacco Use    Smoking status: Current Every Day Smoker     Packs/day: 1.00    Smokeless tobacco: Never Used   Substance and Sexual Activity    Alcohol use: Never     Frequency: Never    Drug use: Never         MEDICATIONS     Current Outpatient Medications   Medication Sig    ARIPiprazole (Abilify) 10 mg tablet 1 tablet    alendronate (Fosamax) 70 mg tablet Take 70 mg by mouth every seven (7) days.  atorvastatin (LIPITOR) 80 mg tablet Take 80 mg by mouth daily.  aspirin 81 mg chewable tablet Take 81 mg by mouth daily.  metoprolol succinate (TOPROL-XL) 25 mg XL tablet Take 1 Tab by mouth nightly. Indications: reduce PVCs    PARoxetine (PaxiL) 20 mg tablet Take 20 mg by mouth daily.  budesonide-formoteroL (Symbicort) 160-4.5 mcg/actuation HFAA Take 2 Puffs by inhalation two (2) times a day.  levothyroxine (SYNTHROID) 200 mcg tablet Take 200 mcg by mouth Daily (before breakfast).  risedronate (ActoneL) 35 mg tablet Take 35 mg by mouth every seven (7) days.  albuterol (PROVENTIL HFA, VENTOLIN HFA, PROAIR HFA) 90 mcg/actuation inhaler Take 2 Puffs by inhalation every six (6) hours as needed for Wheezing. No current facility-administered medications for this visit.         I have reviewed the nurses notes, vitals, problem list, allergy list, medical history, family, social history and medications. REVIEW OF SYMPTOMS      General: Pt denies excessive weight gain or loss. Pt is able to conduct ADL's  HEENT: Denies blurred vision, headaches, hearing loss, epistaxis and difficulty swallowing. Respiratory: Denies cough, congestion, shortness of breath, KNOX, wheezing or stridor. Cardiovascular: Denies precordial pain, palpitations, edema or PND  Gastrointestinal: Denies poor appetite, indigestion, abdominal pain or blood in stool  Genitourinary: Denies hematuria, dysuria, increased urinary frequency  Musculoskeletal: Denies joint pain or swelling from muscles or joints  Neurologic: Denies tremor, paresthesias, headache, or sensory motor disturbance  Psychiatric: Denies confusion, insomnia, depression  Integumentray: Denies rash, itching or ulcers. Hematologic: Denies easy bruising, bleeding       PHYSICAL EXAMINATION      Due to this being a TeleHealth evaluation, many elements of the physical examination are unable to be assessed. General: Well developed, in no acute distress, cooperative and alert  HEENT: Pupils equal/round. No marked JVD visible on video. Respiratory: No audible wheezing, no signs of respiratory distress, lips non cyanotic  Extremities:  No edema  Neuro: A&Ox3, speech clear, no facial droop, answering questions appropriately  Skin: Skin color is normal. No rashes or lesions.  Non diaphoretic on visible skin during exam       DIAGNOSTIC DATA      EKG:        LABORATORY DATA      Lab Results   Component Value Date/Time    WBC 8.6 04/03/2020 12:04 PM    HGB 15.5 04/03/2020 12:04 PM    HCT 46.8 04/03/2020 12:04 PM    PLATELET 302 33/79/1723 12:04 PM    MCV 97.9 04/03/2020 12:04 PM      Lab Results   Component Value Date/Time    Sodium 138 04/03/2020 12:04 PM    Potassium 4.4 04/03/2020 12:04 PM    Chloride 110 (H) 04/03/2020 12:04 PM    CO2 22 04/03/2020 12:04 PM    Anion gap 6 04/03/2020 12:04 PM    Glucose 111 (H) 2020 12:04 PM    BUN 13 2020 12:04 PM    Creatinine 0.74 2020 12:04 PM    BUN/Creatinine ratio 18 2020 12:04 PM    GFR est AA >60 2020 12:04 PM    GFR est non-AA >60 2020 12:04 PM    Calcium 9.2 2020 12:04 PM    Bilirubin, total 0.2 2020 12:04 PM    Alk. phosphatase 79 2020 12:04 PM    Protein, total 7.6 2020 12:04 PM    Albumin 3.8 2020 12:04 PM    Globulin 3.8 2020 12:04 PM    A-G Ratio 1.0 (L) 2020 12:04 PM    ALT (SGPT) 20 2020 12:04 PM           ASSESSMENT      1. PVCs  2. NSVT  3. Palpitations  4. Carotid stenosis  5. Patent foramen ovale   A. Left-to-right shunt          ICD-10-CM ICD-9-CM    1. 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 PFO (patent foramen ovale) Q21.1 745.5    3. Essential hypertension I10 401.9    4. Mixed hyperlipidemia E78.2 272.2    5. Chronic obstructive pulmonary disease, unspecified COPD type ([facility] J44.9 496    6. Dyslipidemia E78.5 272.4    7. Acute CVA (cerebrovascular accident) ([facility] I63.9 434.91    8. Type 2 diabetes mellitus without complication, without long-term current use of insulin (HCC) E11.9 250.00      Orders Placed This Encounter    ARIPiprazole (Abilify) 10 mg tablet     Si tablet    alendronate (Fosamax) 70 mg tablet     Sig: Take 70 mg by mouth every seven (7) days. PLAN     We will have patient hold her Toprol for several days and evaluate with her fatigue/dizziness resolves. If it does, will consider a trial of diltiazem versus an antiarrhythmic drug such as sotalol. However, if her fatigue/dizziness persist will restart Toprol however at 50 mg daily and plan to repeat a Holter monitor 2 weeks after adjusting her Toprol dose. If she is changed to a new medication, will plan for a Holter 2 weeks after changing her medications. We discussed the expected course, resolution and complications of the diagnosis(es) in detail. Medication risks, benefits, costs, interactions, and alternatives were discussed as indicated. I advised her to contact the office if her condition worsens, changes or fails to improve as anticipated. She expressed understanding with the diagnosis(es) and plan     FOLLOW-UP     TBD; will contact patient in 3 days to evaluate response to holding metoprolol    Patient was made aware and verbalized understanding that an appointment will be scheduled for them for a virtual visit and/or office visit within the above time frame. Patient understanding his/her responsibility to call and change time/date if he/she so chooses. Thank you, Natasha Coburn NP and Dr. Radha Williamson for allowing me to participate in the care of this extraordinarily pleasant female. Please do not hesitate to contact me for further questions/concerns. Donald Johns MD  Cardiac Electrophysiology / Cardiology    [place] [address].  [address] [place] [address], [address] [place] [address]  [phone] / [phone] Fax   [phone] / [phone] Fax        Greater than 30 minutes was spent in direct video patient care, planning and chart review. This visit was conducted using [employer] telemedicine services.

## 2020-06-02 NOTE — PROGRESS NOTES
Outbound call to patient. Name and  verified. Reviewed recent test results with patient. She was appreciative of call and expressed understanding.

## 2020-06-03 RX ORDER — SODIUM CHLORIDE 0.9 % (FLUSH) 0.9 %
5-40 SYRINGE (ML) INJECTION EVERY 8 HOURS
Status: CANCELLED | OUTPATIENT
Start: 2020-06-03

## 2020-06-04 ENCOUNTER — TELEPHONE (OUTPATIENT)
Dept: CARDIOLOGY CLINIC | Age: 60
End: 2020-06-04

## 2020-06-04 RX ORDER — METOPROLOL SUCCINATE 50 MG/1
50 TABLET, EXTENDED RELEASE ORAL DAILY
Qty: 30 TAB | Refills: 5 | Status: SHIPPED | OUTPATIENT
Start: 2020-06-04 | End: 2020-12-21

## 2020-06-04 NOTE — TELEPHONE ENCOUNTER
Spoke w/pt who states she has only seen slight improvement w/holding metoprolol & continues to have fatigue and dizziness. Per Dr. Kelsy Parham orders,\" if her fatigue/dizziness persist will restart Toprol however at 50 mg daily and plan to repeat a Holter monitor 2 weeks after adjusting her Toprol dose\". Pt verbalized understanding. Advised I will call her back w/information regarding picking up the Holter monitor in 2 weeks. Pt states she has not returned previous loop monitor. Advised pt to mail back. Pt states she is scheduled to have repair of atrial septal defect tomorrow. Per VO of Dr. Kelsy Parham  LOV: 6/1/2020    No future appointments. Requested Prescriptions     Pending Prescriptions Disp Refills    metoprolol succinate (TOPROL-XL) 50 mg XL tablet 30 Tab 5     Sig: Take 1 Tab by mouth daily.

## 2020-06-05 ENCOUNTER — TELEPHONE (OUTPATIENT)
Dept: CARDIOLOGY CLINIC | Age: 60
End: 2020-06-05

## 2020-06-05 ENCOUNTER — HOSPITAL ENCOUNTER (OUTPATIENT)
Age: 60
Setting detail: OBSERVATION
Discharge: HOME | End: 2020-06-06
Attending: INTERNAL MEDICINE | Admitting: INTERNAL MEDICINE
Payer: MEDICAID

## 2020-06-05 DIAGNOSIS — Q21.10 ASD (ATRIAL SEPTAL DEFECT): Primary | ICD-10-CM

## 2020-06-05 DIAGNOSIS — Q21.11 OSTIUM SECUNDUM TYPE ATRIAL SEPTAL DEFECT: ICD-10-CM

## 2020-06-05 PROBLEM — Q21.12 PATENT FORAMEN OVALE: Status: ACTIVE | Noted: 2020-06-05

## 2020-06-05 LAB
ACT BLD: 142 SECS (ref 79–138)
ACT BLD: 202 SECS (ref 79–138)
ACT BLD: 235 SECS (ref 79–138)
ANION GAP SERPL CALC-SCNC: 8 MMOL/L (ref 5–15)
BUN SERPL-MCNC: 15 MG/DL (ref 6–20)
BUN/CREAT SERPL: 21 (ref 12–20)
CALCIUM SERPL-MCNC: 9.1 MG/DL (ref 8.5–10.1)
CHLORIDE SERPL-SCNC: 111 MMOL/L (ref 97–108)
CO2 SERPL-SCNC: 23 MMOL/L (ref 21–32)
CREAT SERPL-MCNC: 0.7 MG/DL (ref 0.55–1.02)
ERYTHROCYTE [DISTWIDTH] IN BLOOD BY AUTOMATED COUNT: 13.4 % (ref 11.5–14.5)
GLUCOSE SERPL-MCNC: 137 MG/DL (ref 65–100)
HCT VFR BLD AUTO: 43.3 % (ref 35–47)
HGB BLD-MCNC: 14.1 G/DL (ref 11.5–16)
MCH RBC QN AUTO: 32.3 PG (ref 26–34)
MCHC RBC AUTO-ENTMCNC: 32.6 G/DL (ref 30–36.5)
MCV RBC AUTO: 99.1 FL (ref 80–99)
NRBC # BLD: 0 K/UL (ref 0–0.01)
NRBC BLD-RTO: 0 PER 100 WBC
PLATELET # BLD AUTO: 209 K/UL (ref 150–400)
PMV BLD AUTO: 10.1 FL (ref 8.9–12.9)
POTASSIUM SERPL-SCNC: 3.9 MMOL/L (ref 3.5–5.1)
RBC # BLD AUTO: 4.37 M/UL (ref 3.8–5.2)
SODIUM SERPL-SCNC: 142 MMOL/L (ref 136–145)
WBC # BLD AUTO: 8.8 K/UL (ref 3.6–11)

## 2020-06-05 PROCEDURE — 77030013797 HC KT TRNSDUC PRSSR EDWD -A: Performed by: INTERNAL MEDICINE

## 2020-06-05 PROCEDURE — 99218 HC RM OBSERVATION: CPT

## 2020-06-05 PROCEDURE — 99152 MOD SED SAME PHYS/QHP 5/>YRS: CPT | Performed by: INTERNAL MEDICINE

## 2020-06-05 PROCEDURE — 74011250636 HC RX REV CODE- 250/636: Performed by: INTERNAL MEDICINE

## 2020-06-05 PROCEDURE — 80048 BASIC METABOLIC PNL TOTAL CA: CPT

## 2020-06-05 PROCEDURE — 77030004532 HC CATH ANGI DX IMP BSC -A: Performed by: INTERNAL MEDICINE

## 2020-06-05 PROCEDURE — C1759 CATH, INTRA ECHOCARDIOGRAPHY: HCPCS | Performed by: INTERNAL MEDICINE

## 2020-06-05 PROCEDURE — 77030029065 HC DRSG HEMO QCLOT ZMED -B

## 2020-06-05 PROCEDURE — 93451 RIGHT HEART CATH: CPT | Performed by: INTERNAL MEDICINE

## 2020-06-05 PROCEDURE — C1769 GUIDE WIRE: HCPCS | Performed by: INTERNAL MEDICINE

## 2020-06-05 PROCEDURE — C1894 INTRO/SHEATH, NON-LASER: HCPCS | Performed by: INTERNAL MEDICINE

## 2020-06-05 PROCEDURE — 93580 TRANSCATH CLOSURE OF ASD: CPT | Performed by: INTERNAL MEDICINE

## 2020-06-05 PROCEDURE — 74011000250 HC RX REV CODE- 250: Performed by: INTERNAL MEDICINE

## 2020-06-05 PROCEDURE — 74011250637 HC RX REV CODE- 250/637: Performed by: INTERNAL MEDICINE

## 2020-06-05 PROCEDURE — C1751 CATH, INF, PER/CENT/MIDLINE: HCPCS | Performed by: INTERNAL MEDICINE

## 2020-06-05 PROCEDURE — 93662 INTRACARDIAC ECG (ICE): CPT | Performed by: INTERNAL MEDICINE

## 2020-06-05 PROCEDURE — 36415 COLL VENOUS BLD VENIPUNCTURE: CPT

## 2020-06-05 PROCEDURE — 99153 MOD SED SAME PHYS/QHP EA: CPT | Performed by: INTERNAL MEDICINE

## 2020-06-05 PROCEDURE — 85347 COAGULATION TIME ACTIVATED: CPT

## 2020-06-05 PROCEDURE — C1817 SEPTAL DEFECT IMP SYS: HCPCS | Performed by: INTERNAL MEDICINE

## 2020-06-05 PROCEDURE — 85027 COMPLETE CBC AUTOMATED: CPT

## 2020-06-05 DEVICE — CARDIOFORM SEPTAL OCCLUDER 30MM 10FR
Type: IMPLANTABLE DEVICE | Status: FUNCTIONAL
Brand: GORE CARDIOFORM SEPTAL OCCLUDER

## 2020-06-05 RX ORDER — GUAIFENESIN 100 MG/5ML
81 LIQUID (ML) ORAL DAILY
Status: DISCONTINUED | OUTPATIENT
Start: 2020-06-06 | End: 2020-06-06 | Stop reason: HOSPADM

## 2020-06-05 RX ORDER — HEPARIN SODIUM 1000 [USP'U]/ML
INJECTION, SOLUTION INTRAVENOUS; SUBCUTANEOUS AS NEEDED
Status: DISCONTINUED | OUTPATIENT
Start: 2020-06-05 | End: 2020-06-05 | Stop reason: HOSPADM

## 2020-06-05 RX ORDER — SODIUM CHLORIDE 0.9 % (FLUSH) 0.9 %
5-40 SYRINGE (ML) INJECTION AS NEEDED
Status: DISCONTINUED | OUTPATIENT
Start: 2020-06-05 | End: 2020-06-06 | Stop reason: HOSPADM

## 2020-06-05 RX ORDER — ATORVASTATIN CALCIUM 40 MG/1
80 TABLET, FILM COATED ORAL DAILY
Status: DISCONTINUED | OUTPATIENT
Start: 2020-06-06 | End: 2020-06-06 | Stop reason: HOSPADM

## 2020-06-05 RX ORDER — CLOPIDOGREL BISULFATE 75 MG/1
75 TABLET ORAL DAILY
Status: DISCONTINUED | OUTPATIENT
Start: 2020-06-06 | End: 2020-06-06 | Stop reason: HOSPADM

## 2020-06-05 RX ORDER — CLOPIDOGREL BISULFATE 75 MG/1
75 TABLET ORAL DAILY
Qty: 30 TAB | Refills: 5 | Status: SHIPPED | OUTPATIENT
Start: 2020-06-06 | End: 2020-12-22

## 2020-06-05 RX ORDER — FENTANYL CITRATE 50 UG/ML
INJECTION, SOLUTION INTRAMUSCULAR; INTRAVENOUS AS NEEDED
Status: DISCONTINUED | OUTPATIENT
Start: 2020-06-05 | End: 2020-06-05 | Stop reason: HOSPADM

## 2020-06-05 RX ORDER — HEPARIN SODIUM 200 [USP'U]/100ML
INJECTION, SOLUTION INTRAVENOUS
Status: COMPLETED | OUTPATIENT
Start: 2020-06-05 | End: 2020-06-05

## 2020-06-05 RX ORDER — LIDOCAINE HYDROCHLORIDE 10 MG/ML
INJECTION INFILTRATION; PERINEURAL AS NEEDED
Status: DISCONTINUED | OUTPATIENT
Start: 2020-06-05 | End: 2020-06-05 | Stop reason: HOSPADM

## 2020-06-05 RX ORDER — SODIUM CHLORIDE 0.9 % (FLUSH) 0.9 %
5-40 SYRINGE (ML) INJECTION EVERY 8 HOURS
Status: DISCONTINUED | OUTPATIENT
Start: 2020-06-05 | End: 2020-06-05 | Stop reason: HOSPADM

## 2020-06-05 RX ORDER — SODIUM CHLORIDE 9 MG/ML
75 INJECTION, SOLUTION INTRAVENOUS CONTINUOUS
Status: DISCONTINUED | OUTPATIENT
Start: 2020-06-05 | End: 2020-06-05 | Stop reason: HOSPADM

## 2020-06-05 RX ORDER — CLOPIDOGREL 300 MG/1
TABLET, FILM COATED ORAL AS NEEDED
Status: DISCONTINUED | OUTPATIENT
Start: 2020-06-05 | End: 2020-06-05 | Stop reason: HOSPADM

## 2020-06-05 RX ORDER — SODIUM CHLORIDE 9 MG/ML
500 INJECTION, SOLUTION INTRAVENOUS CONTINUOUS
Status: DISPENSED | OUTPATIENT
Start: 2020-06-05 | End: 2020-06-05

## 2020-06-05 RX ORDER — LEVOTHYROXINE SODIUM 200 UG/1
200 TABLET ORAL
Status: DISCONTINUED | OUTPATIENT
Start: 2020-06-06 | End: 2020-06-06 | Stop reason: HOSPADM

## 2020-06-05 RX ORDER — ACETAMINOPHEN 325 MG/1
650 TABLET ORAL
Status: DISCONTINUED | OUTPATIENT
Start: 2020-06-05 | End: 2020-06-06 | Stop reason: HOSPADM

## 2020-06-05 RX ORDER — ARIPIPRAZOLE 10 MG/1
10 TABLET ORAL DAILY
Status: DISCONTINUED | OUTPATIENT
Start: 2020-06-06 | End: 2020-06-06 | Stop reason: HOSPADM

## 2020-06-05 RX ORDER — GUAIFENESIN 100 MG/5ML
81 LIQUID (ML) ORAL DAILY
Status: DISCONTINUED | OUTPATIENT
Start: 2020-06-06 | End: 2020-06-05 | Stop reason: SDUPTHER

## 2020-06-05 RX ORDER — SODIUM CHLORIDE 0.9 % (FLUSH) 0.9 %
5-40 SYRINGE (ML) INJECTION EVERY 8 HOURS
Status: DISCONTINUED | OUTPATIENT
Start: 2020-06-05 | End: 2020-06-06 | Stop reason: HOSPADM

## 2020-06-05 RX ORDER — MIDAZOLAM HYDROCHLORIDE 1 MG/ML
INJECTION, SOLUTION INTRAMUSCULAR; INTRAVENOUS AS NEEDED
Status: DISCONTINUED | OUTPATIENT
Start: 2020-06-05 | End: 2020-06-05 | Stop reason: HOSPADM

## 2020-06-05 RX ORDER — METOPROLOL SUCCINATE 50 MG/1
50 TABLET, EXTENDED RELEASE ORAL DAILY
Status: DISCONTINUED | OUTPATIENT
Start: 2020-06-06 | End: 2020-06-06 | Stop reason: HOSPADM

## 2020-06-05 RX ORDER — PAROXETINE HYDROCHLORIDE 20 MG/1
20 TABLET, FILM COATED ORAL DAILY
Status: DISCONTINUED | OUTPATIENT
Start: 2020-06-06 | End: 2020-06-06 | Stop reason: HOSPADM

## 2020-06-05 RX ORDER — CEFAZOLIN SODIUM/WATER 2 G/20 ML
2 SYRINGE (ML) INTRAVENOUS
Status: COMPLETED | OUTPATIENT
Start: 2020-06-05 | End: 2020-06-05

## 2020-06-05 RX ORDER — GUAIFENESIN 100 MG/5ML
LIQUID (ML) ORAL AS NEEDED
Status: DISCONTINUED | OUTPATIENT
Start: 2020-06-05 | End: 2020-06-05 | Stop reason: HOSPADM

## 2020-06-05 RX ADMIN — Medication 10 ML: at 21:15

## 2020-06-05 RX ADMIN — Medication 2 G: at 09:30

## 2020-06-05 RX ADMIN — SODIUM CHLORIDE 75 ML/HR: 900 INJECTION, SOLUTION INTRAVENOUS at 08:00

## 2020-06-05 NOTE — ROUTINE PROCESS
1413  TRANSFER - IN REPORT:    Verbal report received from Libia Hutson, [facility] (name) on Stamford Williams  being received from Cath Lab (unit) for routine progression of care      Report consisted of patients Situation, Background, Assessment and   Recommendations(SBAR). Information from the following report(s) SBAR, Kardex, Procedure Summary, Intake/Output, MAR, Recent Results and Med Rec Status was reviewed with the receiving nurse. Opportunity for questions and clarification was provided. Assessment completed upon patients arrival to unit and care assumed.

## 2020-06-05 NOTE — PROGRESS NOTES
3681-      Cardiac Cath Lab Procedure Area Arrival Note:    Evette Alvarez arrived to Cardiac Cath Lab, Procedure Area. Patient identifiers verified with NAME and DATE OF BIRTH. Procedure verified with patient. Consent forms verified. Allergies verified. Patient informed of procedure and plan of care. Questions answered with review. Patient voiced understanding of procedure and plan of care. Patient on cardiac monitor, non-invasive blood pressure, SPO2 monitor. Placed On O2 @ 2 lpm via NC.  IV of NS on pump at 200 ml/hr. Patient status doing well without problems. Patient is A&Ox 4. Patient reports no complaints of pain. Patient medicated during procedure with orders obtained and verified by Dr. Rodrigo Hameed. Refer to patients Cardiac Cath Lab PROCEDURE REPORT for vital signs, assessment, status, and response during procedure, printed at end of case. Printed report on chart or scanned into chart. 1055-    TRANSFER - OUT REPORT:    Verbal report given to CG RN (name) on Evette Alvarez  being transferred to  (unit) for routine post - op       Report consisted of patients Situation, Background, Assessment and   Recommendations(SBAR). Information from the following report(s) Procedure Summary and MAR was reviewed with the receiving nurse. Lines:   Peripheral IV 06/05/20 (Active)        Opportunity for questions and clarification was provided.       Patient transported with:   Registered Nurse

## 2020-06-05 NOTE — PROGRESS NOTES
36 Pt received from [facility] cath lab RN s/p PFO closure; pt arousable but sleepy. VSS Denies pain or SOB. Dr. Jaren Mclean notified ot frequent PVCs; K 3.9. No new orders received. 1352 TRANSFER - OUT REPORT:    Verbal report given to Rizwan Damian, unit RN on Tyrell Griffin  being transferred to  for routine progression of care       Report consisted of patients Situation, Background, Assessment and   Recommendations(SBAR). Information from the following report(s) SBAR, Procedure Summary and MAR was reviewed with the receiving nurse. Lines:   Peripheral IV 06/05/20 (Active)        Opportunity for questions and clarification was provided. Patient transported with:   Monitor and rufina Hoyos lab RN on stretcher with belongings and PFO card; Dr. Jaren Mclean spoke with pt.

## 2020-06-05 NOTE — DISCHARGE SUMMARY
Cardiology Discharge Summary     Patient ID:  Shavon Nguyen  [id number]  53 y.o.  1960    Admit Date: 6/5/2020    Discharge Date: 6/6/2020    Admitting Physician: Chio Ansari MD     Discharge Physician: Maryann Calderon MD    Admission Diagnoses:   Ostium secundum type atrial septal defect [Q21.1]  Patent foramen ovale [Q21.1]  ASD (atrial septal defect) [Q21.1]    Discharge Diagnoses: Active Problems:    Patent foramen ovale (6/5/2020)      ASD (atrial septal defect) (6/5/2020)        Discharge Condition: Good    Cardiology Procedures this Admission:  Atrial septal Defect closure    Consults: None    Hospital Course:  Shavon Nguyen, a 63y.o. year-old who had PFO. She underwent  ICE guided closure of ASD. An echo was completed prior to discharge with noted appropriate positioning of closure device. She had no chest pain at discharge. She will be on ASA and plavix for 6 months and then ASA thereafter. She will follow up with Dr. Kavin Pelletier in office in 1 month with echocardiogram.   On day of dc, was ambulated, needs to use cane at home, She and nurse Alon Camacho said no issues overnight. Son will . Denies chest pain, edema, syncope or shortness of breath at rest   Has no tachycardia , palpitations or sense of arrythmia    She has now stopped smoking.   Future Appointments   Date Time Provider Celina Ingram   7/7/2020  1:00  [address], [address]   7/7/2020  1:00 PM [facility], [address]   7/7/2020  2:00 PM Renato Mercado  [address]         Visit Vitals  /85   Pulse 90   Temp 97.7 °F (36.5 °C)   Resp 20   Ht 5' 1\" (1.549 m)   Wt 135 lb (61.2 kg)   SpO2 98%   Breastfeeding No   BMI 25.51 kg/m²       Physical Exam-  Patient Vitals for the past 12 hrs:   Temp Pulse Resp BP SpO2   06/06/20 1233 97.6 °F (36.4 °C) 74 16 91/53 97 %   06/06/20 0952 -- (!) 109 -- 116/64 --   06/06/20 0748 98.3 °F (36.8 °C) 100 18 115/56 100 %   06/06/20 0652 98.2 °F (36.8 °C) 98 18 115/56 97 %   06/06/20 0342 98.1 °F (36.7 °C) 100 18 121/52 95 %      NC AT anicteric scleara  No JVD, supple neck  Extremities: no LE edema  Heart: regular rate and rhythm, S1, S2 normal, no murmurs, clicks, rubs or gallops  Lungs: clear to auscultation bilaterally  Neck: supple, symmetrical, trachea midline, no adenopathy, no JVD, no carotid bruits  Neurologic: Grossly normal    Labs:   Recent Labs     06/05/20  0759   WBC 8.8   HGB 14.1   HCT 43.3        Recent Labs     06/05/20  0759      K 3.9   *   CO2 23   *   BUN 15   CREA 0.70   CA 9.1       No results for input(s): TROIQ, CPK, CKMB in the last 72 hours. CXR:   Other Diagnostic Tests:   Echo:        Disposition: home    Patient Instructions:   Current Discharge Medication List      START taking these medications    Details   clopidogreL (PLAVIX) 75 mg tab Take 1 Tab by mouth daily. Qty: 30 Tab, Refills: 5         CONTINUE these medications which have NOT CHANGED    Details   atorvastatin (LIPITOR) 80 mg tablet Take 80 mg by mouth daily. aspirin 81 mg chewable tablet Take 81 mg by mouth daily. PARoxetine (PaxiL) 20 mg tablet Take 20 mg by mouth daily. budesonide-formoteroL (Symbicort) 160-4.5 mcg/actuation HFAA Take 2 Puffs by inhalation two (2) times a day. levothyroxine (SYNTHROID) 200 mcg tablet Take 200 mcg by mouth Daily (before breakfast). albuterol (PROVENTIL HFA, VENTOLIN HFA, PROAIR HFA) 90 mcg/actuation inhaler Take 2 Puffs by inhalation every six (6) hours as needed for Wheezing. metoprolol succinate (TOPROL-XL) 50 mg XL tablet Take 1 Tab by mouth daily. Qty: 30 Tab, Refills: 5      ARIPiprazole (Abilify) 10 mg tablet 1 tablet      risedronate (ActoneL) 35 mg tablet Take 35 mg by mouth every seven (7) days. STOP taking these medications       alendronate (Fosamax) 70 mg tablet Comments:   Reason for Stopping:             NOTE: pt was NOT taking FOSAMAX PTA. Reference discharge instructions provided by nursing for diet and activity. Follow-up with   Future Appointments   Date Time Provider Celina Ingram   7/7/2020  1:00 PM [address]   7/7/2020  1:00 PM [facility], [address]   7/7/2020  2:00 PM Robinson Hendrickson  [address]       Signed:  Yolanda Moya.  BUCKY Eaton MD 6/6/2020 2:19 PM

## 2020-06-05 NOTE — PROGRESS NOTES
CM attempted to meet with patient at bedside to conduct initial assessment and provide OBS letters- patient is currently sleeping soundly, will follow-up later today as able and appropriate.  JEINSE Marx

## 2020-06-05 NOTE — PROGRESS NOTES
Reason for Admission: Patient is s/p PFO/atrial level intracadiac shunt, history of CVA in April                      RUR Score: No re-admission score, OBS status                    Plan for utilizing home health:  Patient is not open to any home health services currently- patient endorses they tried to get her home health during her previous admission from 4/3-4/5/2020, however, patient did not have health insurance at that time and unable to [facility]        PCP: First and Last name:  Brennon Reasons   Name of Practice: [facility], [place], [place]    Are you a current patient: Yes/No: Yes   Approximate date of last visit: N/A   Can you participate in a virtual visit with your PCP: N/A                    Current Advanced Directive/Advance Care Plan: Not on file- patient's son is NOK                         Transition of Care Plan:  Home, would benefit from PT/OT evaluation     The CM met with the patient at bedside in order to introduce the role of CM and assess for patient needs. The patient lives in a one-story home alone, son Robert White lives approximately one hour away. The patient has 11 grandchildren and two great grandchildren, the patient endorses that she has grandchildren that live locally. The patient verified demographics and insurance information, Medicaid of [place]. The patient endorses that her granddaughter picks-up her medications from the pharmacy- she utilizes [facility] in [place]. The patient ambulates with a cane, endorses she does need some assistance with ADLs, weakness in right arm. The patient endorses that her neighbor (friends for 40 years) comes over and provides assistance when she needs it. The patient endorses that her son will transport home upon discharge. CM inquired about any concerns- patient became tearful, endorses she is a little scared with everything she has been through.  CM offered pastoral care, patient will notify staff if she would like pastoral care visit for additional support. The patient would benefit from PT/OT evaluation prior to discharge. The CM will follow for transitions of care needs. Observation notice provided in writing to patient and/or caregiver as well as verbal explanation of the policy. Patients who are in outpatient status also receive the Observation notice. JENISE Bolton     Care Management Interventions  PCP Verified by CM: Yes([facility]  Mode of Transport at Discharge:  Other (see comment)(Patient's son will transport home upon discharge)  Transition of Care Consult (CM Consult): Discharge Planning  MyChart Signup: No  Health Maintenance Reviewed: Yes  Physical Therapy Consult: No(Patient needs PT evaluation )  Occupational Therapy Consult: No(Patient would benefit from OT evaluation )  Current Support Network: Own Home, Lives Alone  Confirm Follow Up Transport: Family  Discharge Location  Discharge Placement: Home

## 2020-06-05 NOTE — ROUTINE PROCESS
Cardiac Cath Lab Recovery Arrival Note:      John Kramer arrived to Cardiac Cath Lab, Recovery Area. Staff introduced to patient. Patient identifiers verified with NAME and DATE OF BIRTH. Procedure verified with patient. Consent forms reviewed and signed by patient or authorized representative and verified. Allergies verified. Patient informed of procedure and plan of care. Questions answered with review. Patient prepped for procedure, per orders from physician, prior to arrival.    Patient on cardiac monitor, non-invasive blood pressure, SPO2 monitor. Patient is A&Ox 3. Patient reports no pain or nausea. Patient in stretcher, in low position, with side rails up, call bell within reach, patient instructed to call of assistance as needed. Patient prep in: [facility] Recovery Area, Bed# 7. Family in: son outside.    Prep by: Tyesha Padilla RN

## 2020-06-05 NOTE — H&P
[facility] [place]: Santino Allen  [phone]          Cardiology Consult/Progress Note      Requesting/referring provider: Chanell Stevenson  Reason for Consult: Stroke/PFO    HPI: Chris Owen, a 63y.o. year-old who presents for evaluation of PFO in the setting of stroke. Ms. Hans Rodarte has history of hypertension, diabetes, tobacco abuse. She recently suffered a stroke involving left hemiparesis, numbness on the face and speech disturbance. She subsequently has improved from her stroke. As a part of her stroke work-up she underwent evaluation for extra carotid disease. 3 days ago she reported an incidence of transient hand and face numbness which lasted for less than a minute. Otherwise she does not report any recurrent symptoms. She also has prior history of chest pain and hypertension for which she has seen Dr. Andrei Catherine and continues to do well        Investigations personally reviewed by me: While she has moderate bilateral carotid stenosis no critical lesion was identified. Her LV systolic function is normal.  30-day event monitor demonstrated no atrial fibrillation burden. TT Echocardiogram demonstrated intracardiac atrial level shunt likely from a PFO. Atrial septal aneurysm was also noted. KRIS showed fossa ovalis defect/small ASD rather than a PFO. Anatomically appears suitable for closure. Assessment/Plan:  1. PFO/atrial level intracardiac shunt with no evidence of right atrial or ventricular enlargement  2. Recent stroke with MRI suggestive of possible embolic etiology involving the left frontal and middle lobes  3. Hypertension  4. Diabetes mellitus  5. History of tobacco abuse recently quit after 30-pack-year smoking when she had a stroke    Mrs. Hans Rodarte was  evaluated at the request of Dr. Andrei Catherine for recent stroke and a PFO. She has multiple potential causes for stroke including hypertension diabetes and tobacco abuse in addition to likely PFO.   She will benefit from management of all these causes. She has already quit smoking. She is on high intensity statin therapy. I will defer the management of her diabetes mellitus to her primary care physician. Blood pressures are now well controlled on medication. No clear evidence of atrial fibrillation has been found on extended rhythm monitoring. Her rope score is 4. I informed her that the maximum stroke reduction benefit would be provided by controlling hypertension, diabetes and cessation of smoking. Benefit of PFO closure in patients with low to intermediate rope score is uncertain. Finally cumulative benefits of PFO closure in such cases are long-term and not short-term. She expressed her willingness to attempt any therapy that may provide any kind of stroke risk reduction and would like to proceed with closure if anatomically suitable. I will set her up for a KRIS to identify anatomy of her PFO, suitability for percutaneous closure in the relative risks and benefits of closure. []    High complexity decision making was performed  []    Patient is at high-risk of decompensation with multiple organ involvement  She  has a past medical history of Chronic obstructive pulmonary disease ([facility] Diabetes ([facility] Hypercholesteremia, Hypertension, Stroke ([facility] and Thyroid disease. Review of system:Patient reports no dyspnea/PND/Orthpnea/CP. She reports no cough/fever/abdominal pain. All other systems negative except as above.    Family History   Problem Relation Age of Onset    Stroke Neg Hx       Social History     Socioeconomic History    Marital status: SINGLE     Spouse name: Not on file    Number of children: Not on file    Years of education: Not on file    Highest education level: Not on file   Tobacco Use    Smoking status: Current Every Day Smoker     Packs/day: 1.00    Smokeless tobacco: Never Used   Substance and Sexual Activity    Alcohol use: Never     Frequency: Never    Drug use: Never PE  Vitals:    06/05/20 1201 06/05/20 1216 06/05/20 1232 06/05/20 1246   BP: (!) 139/103 (!) 144/92 177/80 135/66   Pulse: 91 97 90 86   Resp:       Temp:       SpO2: 99% 100% 100% 100%   Weight:       Height:        Body mass index is 25.51 kg/m². PHYSICAL EXAMINATION      /66   Pulse 86   Temp 97.7 °F (36.5 °C)   Resp 16   Ht 5' 1\" (1.549 m)   Wt 135 lb (61.2 kg)   SpO2 100%   Breastfeeding No   BMI 25.51 kg/m²   General:    Alert, cooperative, no distress. Psychiatric:    Normal Mood and affect    Eye/ENT:      Pupils equal, No asymmetry, Conjunctival pink. Able to hear voice at normal amplitude   Lungs:      Visibly symmetric chest expansion, No palpable tenderness. Clear to auscultation bilaterally. Heart[de-identified]    Regular rate and rhythm, S1, S2 normal, no murmur, click, rub or gallop. No JVD, Normal palpable peripheral pulses. No cyanosis   Abdomen:     Soft, non-tender. Bowel sounds normal. No masses,  No      organomegaly. Extremities:   Extremities normal, atraumatic, no edema. Neurologic:   CN II-XII grossly intact.  No gross focal deficits             Recent Labs:  Lab Results   Component Value Date/Time    Cholesterol, total 186 04/05/2020 03:03 AM    HDL Cholesterol 43 04/05/2020 03:03 AM    LDL, calculated 117.8 (H) 04/05/2020 03:03 AM    Triglyceride 126 04/05/2020 03:03 AM    CHOL/HDL Ratio 4.3 04/05/2020 03:03 AM     Lab Results   Component Value Date/Time    Creatinine 0.70 06/05/2020 07:59 AM     Lab Results   Component Value Date/Time    BUN 15 06/05/2020 07:59 AM     Lab Results   Component Value Date/Time    Potassium 3.9 06/05/2020 07:59 AM     Lab Results   Component Value Date/Time    Hemoglobin A1c 5.6 04/03/2020 12:04 PM     Lab Results   Component Value Date/Time    HGB 14.1 06/05/2020 07:59 AM     Lab Results   Component Value Date/Time    PLATELET 245 30/83/7039 07:59 AM       Reviewed:  Past Medical History:   Diagnosis Date    Chronic obstructive pulmonary disease ([facility]     Diabetes ([facility]     Borderline    Hypercholesteremia     Hypertension     Stroke ([facility]     Thyroid disease      Social History     Tobacco Use   Smoking Status Current Every Day Smoker    Packs/day: 1.00   Smokeless Tobacco Never Used     Social History     Substance and Sexual Activity   Alcohol Use Never    Frequency: Never     No Known Allergies  Family History   Problem Relation Age of Onset    Stroke Neg Hx         Current Facility-Administered Medications   Medication Dose Route Frequency    0.9% sodium chloride infusion  75 mL/hr IntraVENous CONTINUOUS    sodium chloride (NS) flush 5-40 mL  5-40 mL IntraVENous Q8H    0.9% sodium chloride infusion 500 mL  500 mL IntraVENous CONTINUOUS    sodium chloride (NS) flush 5-40 mL  5-40 mL IntraVENous Q8H    sodium chloride (NS) flush 5-40 mL  5-40 mL IntraVENous PRN    acetaminophen (TYLENOL) tablet 650 mg  650 mg Oral Q6H PRN    [START ON 6/6/2020] aspirin chewable tablet 81 mg  81 mg Oral DAILY    [START ON 6/6/2020] clopidogreL (PLAVIX) tablet 75 mg  75 mg Oral DAILY    [START ON 6/6/2020] aspirin chewable tablet 81 mg  81 mg Oral DAILY    [START ON 6/6/2020] atorvastatin (LIPITOR) tablet 80 mg  80 mg Oral DAILY    [START ON 6/6/2020] metoprolol succinate (TOPROL-XL) XL tablet 50 mg  50 mg Oral DAILY    [START ON 6/6/2020] levothyroxine (SYNTHROID) tablet 200 mcg  200 mcg Oral ACB    [START ON 6/6/2020] PARoxetine (PAXIL) tablet 20 mg  20 mg Oral DAILY    [START ON 6/6/2020] ARIPiprazole (ABILIFY) tablet 10 mg  10 mg Oral DAILY       David Delacruz MD06/05/20 There are other unrelated non-urgent complaints, but due to the busy schedule and the amount of time I've already spent with her, time does not permit me to address these routine issues at today's visit. I've requested another appointment to review these additional issues.     ATTENTION:   This medical record was transcribed using an electronic medical records/speech recognition system. Although proofread, it may and can contain electronic, spelling and other errors. Corrections may be executed at a later time. Please feel free to contact us for any clarifications as needed.     [facility]  [address], [address]

## 2020-06-05 NOTE — PROCEDURES
Findings:  1)NO pulmonary HTN  2)Small sized Fossa ovalis defect/ASD with previously document bidirectional shunt and stroke  3)ICE guided closure of the ASD with 30 mm Kirvin cardioform device. Procedure details:    Bilateral groins were cleaned prepped and draped. After lidocaine injection, bilateral femoral access was achieved with ultrasound guidance using a micropuncture system which was subsequently upsized to 9 [place] in the right common femoral and 11 [place] in the left common femoral.  Ice catheter with was advanced through the 9 [place] sheath into the right atrium with comprehensive assessment of interatrial septum. ASD was noted on the superior aspect of fossa with bidirectional shunt. NO additional ASD was noted on further evaluation. Interatrial atrial septum was aneurysmal.  Size of ASD was about 3 to 4 mm. There was bidirectional shunting noted both on color flow as well as on saline bubble injection. Subsequently, a multipurpose catheter was used to direct wire across the ASD which was subsequently positioned in the right upper pulmonary vein. 30mm Kirvin Cardiogorm 6 Q[place] delivery catheter was advanced through the left common femoral sheath across the PFO and positioned in the left atrium. A 30 mm device was gradually advanced with deployment of left atrial disc followed by pullback and deployment of right atrial disc. Device was interrogated with intracardiac echocardiogram.  Tug test was performed. These confirmed stable device positioning. Hence we decided to release the device after device release, position was again confirmed to be stable on device interrogation with intracardiac echocardiogram.Fianl ICE imaging showed stable device and no pericardial effusion. Intracardiac echocardiogram catheter was removed and hemostasis is achieved with manual pressure. Procedure was performed with anticoagulation using unfractionated heparin.     There were no procedural complications and patient was discharged to recovery in stable condition. Blood Loss minimal    Contrast 20 cc    Recommendations:  1)Aspirin and Plavix for 6 months, after that long term Aspirin. 2)GDMT for future stroke risk reduction and smoking cessation.

## 2020-06-06 ENCOUNTER — APPOINTMENT (OUTPATIENT)
Dept: NON INVASIVE DIAGNOSTICS | Age: 60
End: 2020-06-06
Attending: INTERNAL MEDICINE
Payer: MEDICAID

## 2020-06-06 VITALS
HEIGHT: 61 IN | OXYGEN SATURATION: 97 % | RESPIRATION RATE: 16 BRPM | SYSTOLIC BLOOD PRESSURE: 91 MMHG | BODY MASS INDEX: 24.35 KG/M2 | HEART RATE: 74 BPM | TEMPERATURE: 97.6 F | DIASTOLIC BLOOD PRESSURE: 53 MMHG | WEIGHT: 129 LBS

## 2020-06-06 LAB
ANION GAP SERPL CALC-SCNC: 6 MMOL/L (ref 5–15)
AV VELOCITY RATIO: 0.68
BUN SERPL-MCNC: 7 MG/DL (ref 6–20)
BUN/CREAT SERPL: 13 (ref 12–20)
CALCIUM SERPL-MCNC: 8.4 MG/DL (ref 8.5–10.1)
CHLORIDE SERPL-SCNC: 110 MMOL/L (ref 97–108)
CO2 SERPL-SCNC: 22 MMOL/L (ref 21–32)
CREAT SERPL-MCNC: 0.56 MG/DL (ref 0.55–1.02)
ECHO AO ROOT DIAM: 3.11 CM
ECHO AV AREA PEAK VELOCITY: 1.7 CM2
ECHO AV PEAK GRADIENT: 7.4 MMHG
ECHO AV PEAK VELOCITY: 135.81 CM/S
ECHO LA AREA 4C: 14.5 CM2
ECHO LA MAJOR AXIS: 3.25 CM
ECHO LA TO AORTIC ROOT RATIO: 1.04
ECHO LA VOL 2C: 36.04 ML (ref 22–52)
ECHO LA VOL 4C: 33.16 ML (ref 22–52)
ECHO LA VOL BP: 39.5 ML (ref 22–52)
ECHO LA VOL/BSA BIPLANE: 25.19 ML/M2 (ref 16–28)
ECHO LA VOLUME INDEX A2C: 22.99 ML/M2 (ref 16–28)
ECHO LA VOLUME INDEX A4C: 21.15 ML/M2 (ref 16–28)
ECHO LV E' LATERAL VELOCITY: 8.14 CM/S
ECHO LV E' SEPTAL VELOCITY: 6.85 CM/S
ECHO LV INTERNAL DIMENSION DIASTOLIC: 4.03 CM (ref 3.9–5.3)
ECHO LV INTERNAL DIMENSION SYSTOLIC: 2.65 CM
ECHO LV IVSD: 0.99 CM (ref 0.6–0.9)
ECHO LV MASS 2D: 129.6 G (ref 67–162)
ECHO LV MASS INDEX 2D: 82.7 G/M2 (ref 43–95)
ECHO LV POSTERIOR WALL DIASTOLIC: 0.86 CM (ref 0.6–0.9)
ECHO LVOT DIAM: 1.78 CM
ECHO LVOT PEAK GRADIENT: 3.5 MMHG
ECHO LVOT PEAK VELOCITY: 92.91 CM/S
ECHO MV A VELOCITY: 90.23 CM/S
ECHO MV AREA PHT: 5 CM2
ECHO MV E DECELERATION TIME (DT): 150.7 MS
ECHO MV E VELOCITY: 52.6 CM/S
ECHO MV E/A RATIO: 0.58
ECHO MV E/E' LATERAL: 6.46
ECHO MV E/E' RATIO (AVERAGED): 7.07
ECHO MV E/E' SEPTAL: 7.68
ECHO MV PRESSURE HALF TIME (PHT): 43.7 MS
ECHO PV MAX VELOCITY: 94.44 CM/S
ECHO PV PEAK GRADIENT: 3.6 MMHG
ECHO RV INTERNAL DIMENSION: 3.22 CM
ECHO RV TAPSE: 1.46 CM (ref 1.5–2)
ERYTHROCYTE [DISTWIDTH] IN BLOOD BY AUTOMATED COUNT: 13.2 % (ref 11.5–14.5)
GLUCOSE SERPL-MCNC: 111 MG/DL (ref 65–100)
HCT VFR BLD AUTO: 40.6 % (ref 35–47)
HGB BLD-MCNC: 13.4 G/DL (ref 11.5–16)
LVFS 2D: 34.38 %
MCH RBC QN AUTO: 32.5 PG (ref 26–34)
MCHC RBC AUTO-ENTMCNC: 33 G/DL (ref 30–36.5)
MCV RBC AUTO: 98.5 FL (ref 80–99)
MV DEC SLOPE: 3.49
NRBC # BLD: 0 K/UL (ref 0–0.01)
NRBC BLD-RTO: 0 PER 100 WBC
PLATELET # BLD AUTO: 184 K/UL (ref 150–400)
PMV BLD AUTO: 9.1 FL (ref 8.9–12.9)
POTASSIUM SERPL-SCNC: 3.9 MMOL/L (ref 3.5–5.1)
RBC # BLD AUTO: 4.12 M/UL (ref 3.8–5.2)
SODIUM SERPL-SCNC: 138 MMOL/L (ref 136–145)
WBC # BLD AUTO: 12.2 K/UL (ref 3.6–11)

## 2020-06-06 PROCEDURE — 99218 HC RM OBSERVATION: CPT

## 2020-06-06 PROCEDURE — 74011250637 HC RX REV CODE- 250/637: Performed by: INTERNAL MEDICINE

## 2020-06-06 PROCEDURE — 85027 COMPLETE CBC AUTOMATED: CPT

## 2020-06-06 PROCEDURE — 36415 COLL VENOUS BLD VENIPUNCTURE: CPT

## 2020-06-06 PROCEDURE — 93306 TTE W/DOPPLER COMPLETE: CPT

## 2020-06-06 PROCEDURE — 80048 BASIC METABOLIC PNL TOTAL CA: CPT

## 2020-06-06 RX ADMIN — PAROXETINE HYDROCHLORIDE 20 MG: 20 TABLET, FILM COATED ORAL at 09:53

## 2020-06-06 RX ADMIN — ACETAMINOPHEN 650 MG: 325 TABLET ORAL at 09:58

## 2020-06-06 RX ADMIN — METOPROLOL SUCCINATE 50 MG: 50 TABLET, EXTENDED RELEASE ORAL at 09:53

## 2020-06-06 RX ADMIN — ASPIRIN 81 MG 81 MG: 81 TABLET ORAL at 09:53

## 2020-06-06 RX ADMIN — ARIPIPRAZOLE 10 MG: 10 TABLET ORAL at 09:53

## 2020-06-06 RX ADMIN — ATORVASTATIN CALCIUM 80 MG: 40 TABLET, FILM COATED ORAL at 09:53

## 2020-06-06 RX ADMIN — LEVOTHYROXINE SODIUM 200 MCG: 0.2 TABLET ORAL at 09:53

## 2020-06-06 RX ADMIN — CLOPIDOGREL BISULFATE 75 MG: 75 TABLET ORAL at 09:52

## 2020-06-06 NOTE — PROGRESS NOTES
Problem: Falls - Risk of  Goal: *Absence of Falls  Description: Document [place] Flow Fall Risk and appropriate interventions in the flowsheet. Outcome: Progressing Towards Goal  Note: Fall Risk Interventions:  Medication Interventions: Teach patient to arise slowly, Patient to call before getting OOB       Problem: Cath Lab Procedures: Post-Cath Day of Procedure (Initiate SCIP Measures for Post-Op Care)  Goal: Psychosocial  Outcome: Progressing Towards Goal  Goal: *Procedure site is without bleeding and signs of infection six hours post sheath removal  Outcome: Progressing Towards Goal  Goal: *Hemodynamically stable  Outcome: Progressing Towards Goal  Goal: *Optimal pain control at patient's stated goal  Outcome: Progressing Towards Goal       0730: Bedside and Verbal shift change report given to JAZZ Danielson (oncoming nurse) by Stevie Francis (offgoing nurse). Report included the following information SBAR, Kardex, Intake/Output, MAR, Recent Results, Med Rec Status and Cardiac Rhythm NSR w/ PVCs.

## 2020-06-08 ENCOUNTER — PATIENT OUTREACH (OUTPATIENT)
Dept: CASE MANAGEMENT | Age: 60
End: 2020-06-08

## 2020-06-08 NOTE — PROGRESS NOTES
Patient contacted regarding recent discharge and COVID-19 risk. Discussed COVID-19 related testing which was available at this time. Test results were negative. Patient informed of results, if available? No, pt had pre-operative testing  Care Transition Nurse/ Ambulatory Care Manager contacted the family by telephone to perform post discharge assessment. Verified name and  with family as identifiers. Patient has following risk factors of: diabetes and recent hosital visit to [facility], discharged 2020. CTN/ACM reviewed discharge instructions, medical action plan and red flags related to discharge diagnosis. Reviewed and educated them on any new and changed medications related to discharge diagnosis. Advised obtaining a 90-day supply of all daily and as-needed medications. Education provided regarding infection prevention, and signs and symptoms of COVID-19 and when to seek medical attention with family who verbalized understanding. Discussed exposure protocols and quarantine from [address] you at higher risk for severe illness  and given an opportunity for questions and concerns. The family agrees to contact the COVID-19 hotline [phone] or PCP office for questions related to their healthcare. CTN/ACM provided contact information for future reference. From CDC: Are you at higher risk for severe illness?  Wash your hands often.  Avoid close contact (6 feet, which is about two arm lengths) with people who are sick.  Put distance between yourself and other people if COVID-19 is spreading in your community.  Clean and disinfect frequently touched surfaces.  Avoid all cruise travel and non-essential air travel.  Call your healthcare professional if you have concerns about COVID-19 and your underlying condition or if you are sick.     For more information on steps you can take to protect yourself, see CDC's How to Protect Yourself      Patient/family/caregiver given information for [employer] [employer] and agrees to enroll   Yes  Patient email:  [email or web address]. [email or web address]  Patient telephone number: [phone]    Plan for follow-up call in 1-2 days based on severity of symptoms and risk factors. Called pt and unable to LVM as VM is full. Called pt's emergency contact Wendy Sow who states that pt has been on the telephone with physician's office all day. Son states that he will call pt and give her my contact information. I provided pt with [address] telephone number as well as my contact information. Son reports that pt has medications as he picked it up from pharmacy before getting pt from the hospital.      Pt returned my call and we reviewed that she is out of atorvastatin and she will have her pharmacy request that medication. Pt states that she if feeling better. Pt reports that she has been tested for COVID19 x 2 and has been negative and states understanding that she is at risk. Pt reports that her granddaughter, a nurse, tested positive. Granddaughter had been living with pt and has moved out. Pt has [address] telephone number as well as my contact information.

## 2020-06-19 ENCOUNTER — CLINICAL SUPPORT (OUTPATIENT)
Dept: CARDIOLOGY CLINIC | Age: 60
End: 2020-06-19

## 2020-06-19 DIAGNOSIS — I49.3 PVC (PREMATURE VENTRICULAR CONTRACTION): ICD-10-CM

## 2020-06-19 DIAGNOSIS — R42 DIZZINESS: Primary | ICD-10-CM

## 2020-06-19 NOTE — PROGRESS NOTES
Applied 24 hr holter per Dr Geraldine Tang dx: dizzy, PVCs. Pt has #[id number]   Chargeable visit.   [employer]

## 2020-06-25 ENCOUNTER — PATIENT OUTREACH (OUTPATIENT)
Dept: INTERNAL MEDICINE CLINIC | Age: 60
End: 2020-06-25

## 2020-06-25 NOTE — PROGRESS NOTES
Patient resolved from Transition of Care episode on 6/25/2020  Discussed COVID-19 related testing which was not done at this time. Test results were not done. Patient informed of results, if available? n/a     Patient/family has been provided the following resources and education related to COVID-19:                         Signs, symptoms and red flags related to COVID-19            [facility] exposure and quarantine guidelines            [facility] exposure contact - [phone]            Contact for their local Department of Health                 Patient currently reports that the following symptoms have improved:  no new symptoms. No further outreach scheduled with this CTN/ACM/LPN/HC/ MA. Episode of Care resolved. Patient has this CTN/ACM/LPN/HC/MA contact information if future needs arise.

## 2020-06-26 RX ORDER — ATORVASTATIN CALCIUM 80 MG/1
80 TABLET, FILM COATED ORAL
Qty: 90 TAB | Refills: 2 | Status: SHIPPED | OUTPATIENT
Start: 2020-06-26 | End: 2021-08-24

## 2020-07-28 ENCOUNTER — OFFICE VISIT (OUTPATIENT)
Dept: CARDIOLOGY CLINIC | Age: 60
End: 2020-07-28

## 2020-07-28 VITALS
HEART RATE: 76 BPM | RESPIRATION RATE: 17 BRPM | HEIGHT: 61 IN | DIASTOLIC BLOOD PRESSURE: 58 MMHG | SYSTOLIC BLOOD PRESSURE: 100 MMHG | BODY MASS INDEX: 24.17 KG/M2 | OXYGEN SATURATION: 96 % | WEIGHT: 128 LBS

## 2020-07-28 DIAGNOSIS — Q21.12 PFO (PATENT FORAMEN OVALE): Primary | ICD-10-CM

## 2020-07-28 RX ORDER — IPRATROPIUM BROMIDE AND ALBUTEROL SULFATE 2.5; .5 MG/3ML; MG/3ML
3 SOLUTION RESPIRATORY (INHALATION)
COMMUNITY
Start: 2020-07-09

## 2020-07-28 RX ORDER — VARENICLINE TARTRATE 0.5 MG/1
TABLET, FILM COATED ORAL
COMMUNITY
Start: 2020-07-09 | End: 2021-08-04

## 2020-07-28 RX ORDER — TRAMADOL HYDROCHLORIDE 50 MG/1
50 TABLET ORAL
COMMUNITY
Start: 2020-07-09

## 2020-07-28 NOTE — LETTER
7/28/20 Patient: Brigid Holliday YOB: 1960 Date of Visit: 7/28/2020 Rashaad Ku NP 
[facility] [address] [facility] [address] VIA Facsimile: [phone] Dear Rashaad Ku NP, Thank you for referring Ms. Yas Hutchison to [facility] for evaluation. My notes for this consultation are attached. If you have questions, please do not hesitate to call me. I look forward to following your patient along with you.  
 
 
Sincerely, 
 
Melody Reyes MD

## 2020-07-28 NOTE — PROGRESS NOTES
[facility] [place]: Rhonda Mukherjee  [phone]          Cardiology structural heart disease progress note      Requesting/referring provider: BUCKY Singh  Reason for Consult: Stroke with fossa ovalis defect/ASD    HPI: Rimma Grayson, a 63y.o. year-old who presents for evaluation of PFO in the setting of stroke. Ms. Radha Contreras has history of hypertension, diabetes, tobacco abuse. She recently suffered a stroke involving left hemiparesis, numbness on the face and speech disturbance. She subsequently has improved from her stroke. As a part of her stroke work-up she underwent evaluation for extra carotid disease. 3 days ago she reported an incidence of transient hand and face numbness which lasted for less than a minute. Otherwise she does not report any recurrent symptoms. She also has prior history of chest pain and hypertension for which she has seen Dr. Luz Maria Wharton. Atrial fibrillation was ruled out on 30-day event monitor. Eventually she underwent PFO closure with a 30 mm Stinnett cardio form device. Postoperatively she has done very well. She occasionally has atypical mild chest discomfort extending to her epigastrium which is unrelated to activity and does not radiate. This is something that she has noted in the past even before PFO closure. She also reports occasional short bursts of palpitation which last few seconds to a minute. She does not report any associated dizziness or lightheadedness with these. She denies any symptoms consistent with congestive heart failure at this time. Other cardiovascular issues include hypertension, hyperlipidemia and diabetes mellitus all of which are stable. Investigations personally reviewed by me: While she has moderate bilateral carotid stenosis no critical lesion was identified. Her LV systolic function is normal.  30-day event monitor demonstrated no atrial fibrillation burden.   TT Echocardiogram demonstrated intracardiac atrial level shunt likely from a PFO. Atrial septal aneurysm was also noted. KRIS showed fossa ovalis defect/small ASD rather than a PFO. Anatomically appears suitable for closure. 30-day event monitor did not demonstrate any significant atrial fibrillation burden. Assessment/Plan:  1. Atrial level intracardiac shunt with secondary to complex fossa ovalis defect/ASD. No evidence of right atrial or ventricular enlargement/ volume overload. Only manifestation being possible recurrent embolic strokes. Now status post PFO closure with Wickes cardio form 30 mm device. 2.  Recent stroke with MRI suggestive of possible embolic etiology involving the left frontal and middle lobes  3. Hypertension  4. Diabetes mellitus  5. History of tobacco abuse recently quit after 30-pack-year smoking when she had a stroke  6. Hyperlipidemia stable and controlled on her intensity statin therapy    Mrs. Celi Daniel was  evaluated at the request of Dr. Graciela Howe for recent stroke and a PFO. She has multiple potential causes for stroke including hypertension diabetes and tobacco abuse in addition to likely PFO. She will benefit from management of all these causes. She has already quit smoking. She is on high intensity statin therapy. I will defer the management of her diabetes mellitus to her primary care physician. Blood pressures are now well controlled on medication. No clear evidence of atrial fibrillation has been found on extended rhythm monitoring. She underwent PFO closure about 6 weeks ago with a 30 mm Wickes cardio form device. She was supposed to get an echocardiogram today but unfortunately due to technical issues could not get it. She is reporting occasional palpitations which could be short bursts of atrial tachycardia post PFO closure which are expected in the first 3 months. I expect this to settle down hopefully by September.   I would like her to be seen by Dr. Graciela Howe in a month or 2 and she can get her echocardiogram at that point of time. Infective endocarditis prophylaxis lifelong was discussed with the patient for any invasive dental work-up. She should continue Plavix and aspirin based therapy for a total of 6 months post PFO closure device placement. After that Plavix can be discontinued. Long-term statin therapy and aspirin therapy is recommended. Last LDL was 132 and since then statin therapy has been increased. I recommend follow-up lipids with Dr. Saman Guzmán. I will probably see her at 1 year anniversary post PFO closure. Rest of the cardiology follow-up can be continued with Dr. Gerold Halsted. []    High complexity decision making was performed  []    Patient is at high-risk of decompensation with multiple organ involvement  She  has a past medical history of Chronic obstructive pulmonary disease ([facility] [address] Diabetes ([facility] [address] Hypercholesteremia, Hypertension, Stroke ([facility] [address] and Thyroid disease. Review of system:Patient reports no dyspnea/PND/Orthpnea/CP. She reports no cough/fever/abdominal pain. All other systems negative except as above. Family History   Problem Relation Age of Onset    Stroke Neg Hx       Social History     Socioeconomic History    Marital status: SINGLE     Spouse name: Not on file    Number of children: Not on file    Years of education: Not on file    Highest education level: Not on file   Tobacco Use    Smoking status: Current Every Day Smoker     Packs/day: 1.00    Smokeless tobacco: Never Used   Substance and Sexual Activity    Alcohol use: Never     Frequency: Never    Drug use: Never      PE  Vitals:    07/28/20 0942   BP: 100/58   Pulse: 76   Resp: 17   SpO2: 96%   Weight: 128 lb (58.1 kg)   Height: 5' 1\" (1.549 m)    Body mass index is 24.19 kg/m².         PHYSICAL EXAMINATION      /58 (BP 1 Location: Right arm, BP Patient Position: Sitting)   Pulse 76   Resp 17   Ht 5' 1\" (1.549 m)   Wt 128 lb (58.1 kg)   SpO2 96%   BMI 24.19 kg/m²   General:    Alert, cooperative, no distress. Psychiatric:    Normal Mood and affect    Eye/ENT:      Pupils equal, No asymmetry, Conjunctival pink. Able to hear voice at normal amplitude   Lungs:      Visibly symmetric chest expansion, No palpable tenderness. Clear to auscultation bilaterally. Heart[de-identified]    Regular rate and rhythm, S1, S2 normal, no murmur, click, rub or gallop. No JVD, Normal palpable peripheral pulses. No cyanosis   Abdomen:     Soft, non-tender. Bowel sounds normal. No masses,  No      organomegaly. Extremities:   Extremities normal, atraumatic, no edema. Neurologic:   CN II-XII grossly intact.  No gross focal deficits             Recent Labs:  Lab Results   Component Value Date/Time    Cholesterol, total 186 04/05/2020 03:03 AM    HDL Cholesterol 43 04/05/2020 03:03 AM    LDL, calculated 117.8 (H) 04/05/2020 03:03 AM    Triglyceride 126 04/05/2020 03:03 AM    CHOL/HDL Ratio 4.3 04/05/2020 03:03 AM     Lab Results   Component Value Date/Time    Creatinine 0.56 06/06/2020 03:51 AM     Lab Results   Component Value Date/Time    BUN 7 06/06/2020 03:51 AM     Lab Results   Component Value Date/Time    Potassium 3.9 06/06/2020 03:51 AM     Lab Results   Component Value Date/Time    Hemoglobin A1c 5.6 04/03/2020 12:04 PM     Lab Results   Component Value Date/Time    HGB 13.4 06/06/2020 03:51 AM     Lab Results   Component Value Date/Time    PLATELET 204 07/98/6379 03:51 AM       Reviewed:  Past Medical History:   Diagnosis Date    Chronic obstructive pulmonary disease ([facility]     Diabetes ([facility]     Borderline    Hypercholesteremia     Hypertension     Stroke ([facility]     Thyroid disease      Social History     Tobacco Use   Smoking Status Current Every Day Smoker    Packs/day: 1.00   Smokeless Tobacco Never Used     Social History     Substance and Sexual Activity   Alcohol Use Never    Frequency: Never     No Known Allergies  Family History   Problem Relation Age of Onset    Stroke Neg Hx         Current Outpatient Medications   Medication Sig    traMADoL (ULTRAM) 50 mg tablet TAKE 1 TABLET BY MOUTH EVERY 6 HOURS AS NEEDED FOR 7 DAYS    albuterol-ipratropium (DUO-NEB) 2.5 mg-0.5 mg/3 ml nebu USE 1 AMPULE IN NEBULIZER EVERY 6 HOURS AS NEEDED FOR 30 DAYS    Chantix 0.5 mg tablet TAKE 1 TABLET BY MOUTH ON DAYS 1 3 1 TABLET TWICE DAILY ON DAYS 4 7 THEN 2 TABLETS TWICE DAILY FOR 30 DAYS    atorvastatin (LIPITOR) 80 mg tablet Take 1 Tab by mouth nightly.  clopidogreL (PLAVIX) 75 mg tab Take 1 Tab by mouth daily.  metoprolol succinate (TOPROL-XL) 50 mg XL tablet Take 1 Tab by mouth daily.  ARIPiprazole (Abilify) 10 mg tablet 1 tablet    aspirin 81 mg chewable tablet Take 81 mg by mouth daily.  PARoxetine (PaxiL) 20 mg tablet Take 20 mg by mouth daily.  budesonide-formoteroL (Symbicort) 160-4.5 mcg/actuation HFAA Take 2 Puffs by inhalation two (2) times a day.  levothyroxine (SYNTHROID) 175 mcg tablet Take 175 mcg by mouth Daily (before breakfast).  risedronate (ActoneL) 35 mg tablet Take 35 mg by mouth every seven (7) days.  albuterol (PROVENTIL HFA, VENTOLIN HFA, PROAIR HFA) 90 mcg/actuation inhaler Take 2 Puffs by inhalation every six (6) hours as needed for Wheezing. No current facility-administered medications for this visit. Alphonso Pelletier MD07/28/20 There are other unrelated non-urgent complaints, but due to the busy schedule and the amount of time I've already spent with her, time does not permit me to address these routine issues at today's visit. I've requested another appointment to review these additional issues. ATTENTION:   This medical record was transcribed using an electronic medical records/speech recognition system. Although proofread, it may and can contain electronic, spelling and other errors. Corrections may be executed at a later time. Please feel free to contact us for any clarifications as needed.     [employer] heart and [facility] [address], [address]  [address] [facility], [address] [place] [address]

## 2020-08-28 ENCOUNTER — ANCILLARY PROCEDURE (OUTPATIENT)
Dept: CARDIOLOGY CLINIC | Age: 60
End: 2020-08-28
Payer: MEDICAID

## 2020-08-28 VITALS
HEIGHT: 60 IN | SYSTOLIC BLOOD PRESSURE: 126 MMHG | BODY MASS INDEX: 25.13 KG/M2 | WEIGHT: 128 LBS | DIASTOLIC BLOOD PRESSURE: 70 MMHG

## 2020-08-28 DIAGNOSIS — Q21.12 PFO (PATENT FORAMEN OVALE): ICD-10-CM

## 2020-08-28 PROCEDURE — 93306 TTE W/DOPPLER COMPLETE: CPT | Performed by: INTERNAL MEDICINE

## 2020-08-28 RX ORDER — SODIUM CHLORIDE 0.9 % (FLUSH) 0.9 %
10 SYRINGE (ML) INJECTION AS NEEDED
Status: SHIPPED | OUTPATIENT
Start: 2020-08-28

## 2020-08-31 ENCOUNTER — TELEPHONE (OUTPATIENT)
Dept: CARDIOLOGY CLINIC | Age: 60
End: 2020-08-31

## 2020-08-31 LAB
ECHO AO ROOT DIAM: 3.04 CM
ECHO AV AREA PEAK VELOCITY: 2.25 CM2
ECHO AV AREA VTI: 1.97 CM2
ECHO AV AREA/BSA PEAK VELOCITY: 1.5 CM2/M2
ECHO AV AREA/BSA VTI: 1.3 CM2/M2
ECHO AV MEAN GRADIENT: 4.43 MMHG
ECHO AV PEAK GRADIENT: 8.2 MMHG
ECHO AV PEAK VELOCITY: 143.16 CM/S
ECHO AV VTI: 29.42 CM
ECHO LA AREA 4C: 16.54 CM2
ECHO LA MAJOR AXIS: 3.12 CM
ECHO LA MINOR AXIS: 2.02 CM
ECHO LA VOL 2C: 55.01 ML (ref 22–52)
ECHO LA VOL 4C: 41.85 ML (ref 22–52)
ECHO LA VOL BP: 50.7 ML (ref 22–52)
ECHO LA VOL/BSA BIPLANE: 32.83 ML/M2 (ref 16–28)
ECHO LA VOLUME INDEX A2C: 35.63 ML/M2 (ref 16–28)
ECHO LA VOLUME INDEX A4C: 27.1 ML/M2 (ref 16–28)
ECHO LV E' LATERAL VELOCITY: 6.72 CM/S
ECHO LV E' SEPTAL VELOCITY: 5.21 CM/S
ECHO LV EDV A2C: 95.11 ML
ECHO LV EDV A4C: 89.43 ML
ECHO LV EDV BP: 93.32 ML (ref 56–104)
ECHO LV EDV INDEX A4C: 57.9 ML/M2
ECHO LV EDV INDEX BP: 60.4 ML/M2
ECHO LV EDV NDEX A2C: 61.6 ML/M2
ECHO LV EJECTION FRACTION A2C: 58 PERCENT
ECHO LV EJECTION FRACTION A4C: 62 PERCENT
ECHO LV EJECTION FRACTION BIPLANE: 58.1 PERCENT (ref 55–100)
ECHO LV ESV A2C: 39.82 ML
ECHO LV ESV A4C: 34.33 ML
ECHO LV ESV BP: 39.14 ML (ref 19–49)
ECHO LV ESV INDEX A2C: 25.8 ML/M2
ECHO LV ESV INDEX A4C: 22.2 ML/M2
ECHO LV ESV INDEX BP: 25.3 ML/M2
ECHO LV INTERNAL DIMENSION DIASTOLIC: 4.48 CM (ref 3.9–5.3)
ECHO LV INTERNAL DIMENSION SYSTOLIC: 2.87 CM
ECHO LV IVSD: 0.76 CM (ref 0.6–0.9)
ECHO LV MASS 2D: 101.9 G (ref 67–162)
ECHO LV MASS INDEX 2D: 66 G/M2 (ref 43–95)
ECHO LV POSTERIOR WALL DIASTOLIC: 0.72 CM (ref 0.6–0.9)
ECHO LVOT DIAM: 2 CM
ECHO LVOT PEAK GRADIENT: 4.26 MMHG
ECHO LVOT PEAK VELOCITY: 103.15 CM/S
ECHO LVOT SV: 57.9 ML
ECHO LVOT VTI: 18.5 CM
ECHO MV A VELOCITY: 96.99 CM/S
ECHO MV E DECELERATION TIME (DT): 0.36 S
ECHO MV E VELOCITY: 71.02 CM/S
ECHO MV E/A RATIO: 0.73
ECHO MV E/E' LATERAL: 10.57
ECHO MV E/E' RATIO (AVERAGED): 12.1
ECHO MV E/E' SEPTAL: 13.63
ECHO MV MAX VELOCITY: 99.67 CM/S
ECHO MV MEAN GRADIENT: 1.38 MMHG
ECHO MV PEAK GRADIENT: 3.97 MMHG
ECHO MV VTI: 26.84 CM
ECHO RA AREA 4C: 12.03 CM2
ECHO RV INTERNAL DIMENSION: 3.25 CM

## 2020-08-31 NOTE — TELEPHONE ENCOUNTER
----- Message from Yung Garcia MD sent at 8/31/2020 10:58 AM EDT -----  Inform her that device is working well with no residual shunt across the interatrial septum. She should see Dr Sue Porter in December and me at 1 year.

## 2020-08-31 NOTE — LETTER
9/21/2020 10:04 AM 
 
Ms. Radene Kawasaki [address] [address] We have attempted several times to get in touch with you via phone to give you your results. The echo showed device is working well with no residual shunt across the interatrial septum. You should see Dr Annamarie Nevarez in December and Dr. Consuela Closs at 1 year. Future Appointments Date Time Provider [facility] 12/18/2020  3:00 PM MD SILVIA Flores BS AMB  
7/27/2021 10:00 AM MACIE LUZ BS AMB  
7/27/2021 10:00 AM MACIE CHURCH BS AMB  
7/27/2021 11:00 AM MD SRINI Schwab BS AMB Sincerely, 
 
 
Candice Szymanski MD

## 2020-09-01 ENCOUNTER — TELEPHONE (OUTPATIENT)
Dept: CARDIOLOGY CLINIC | Age: 60
End: 2020-09-01

## 2020-09-21 NOTE — TELEPHONE ENCOUNTER
Mailed letter with results.    Future Appointments   Date Time Provider Celina Ingram   12/18/2020  3:00 PM MD SILVIA Duran BS AMB   7/27/2021 10:00 AM MACIE LUZ AMB   7/27/2021 10:00 AM MACIE CHURCH AMB   7/27/2021 11:00 AM MD SIRNI Camacho BS AMB

## 2020-12-21 RX ORDER — METOPROLOL SUCCINATE 50 MG/1
TABLET, EXTENDED RELEASE ORAL
Qty: 30 TAB | Refills: 0 | Status: SHIPPED | OUTPATIENT
Start: 2020-12-21 | End: 2021-06-24 | Stop reason: SDUPTHER

## 2020-12-23 ENCOUNTER — TELEPHONE (OUTPATIENT)
Dept: CARDIOLOGY CLINIC | Age: 60
End: 2020-12-23

## 2020-12-23 NOTE — TELEPHONE ENCOUNTER
You 22 hours ago (12:12 PM)        Attempted to call patient. Unable to leave a voicemail due to mailbox is full. Documentation       Pham Atkinson MD  You; Scar Adan., NP 2 days ago     Viktoria Sal can you call patient to d/c Plavix once she is done with left over pills. . And take it off her chart

## 2021-01-06 NOTE — PROGRESS NOTES
6/5/20 Closure of PFO Hospital Course: [facility], [place] y. o. year-old who had PFO. She underwent  ICE guided closure of ASD. An echo was completed prior to discharge with noted appropriate positioning of closure device. She had no chest pain at discharge. She will be on ASA and plavix for 6 months and then ASA thereafter. She will follow up with Dr. Laith Carr in office in 1 month with echocardiogram.  
On day of dc, was ambulated, needs to use cane at home, She and nurse Jose Alejandro Gao said no issues overnight. Son will . Denies chest pain, edema, syncope or shortness of breath at rest  
Has no tachycardia , palpitations or sense of arrythmia She has now stopped smoking. 4/3/20: 
Seen in the ER by Barbara Hill Assessment · Acute left frontoparietal stroke possibly cardioembolic in etiology · HTN 
· Large PFO by echo · Nonobstructive carotid disease · Nicotine dependence · COPD Discussion/Recommendations: 
Presentation not typical for paradoxical stroke and she has multiple atherosclerotic vascular RFs, also at risk for AF Venous duplex 
antiplt Rx for now Consider 30 day event recorder vs ILR to fully exclude AF Consider PFO closure if above negative Await neuro input 
  
Subjective: 
Admitted with left hemispheric stroke with dysarthria and right arm weakness. No lytic given given delayed presentation. She reports somewhat stuttering course. MRI today with left frontoparietal infarction suggestive of embolic etiology. Head/neck CTA with nonobstructive disease. Tele - frequent PVCs, no AF. Echo with significant PFP with right to left shunt, more prominent post Valsalva. Sx improving.  
  
Has chronic KNOX, no chest pain. No palpitations. Patient denies any exertional chest pain, palpitations, syncope, orthopnea, edema or paroxysmal nocturnal dyspnea. No leg swelling Smokes daily Hx of heart attack per pt several years ago but no cath 
  
 
 Audrey Godinez is a 63 y.o. female who was seen by synchronous (real-time) audio-video technology on 5/5/2020. Patient is being seen today for followup from recent hosp with stroke with findings of PFO, left ICA disease and frequent complex ventricular ectopy on loop recorder 
  
 ASSESSMENT  
  
    ICD-10-CM ICD-9-CM    
1. 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    
2. Dyslipidemia E78.5 272.4    
3. Chronic obstructive pulmonary disease, unspecified COPD type ([facility] J44.9 496    
4. HTN (hypertension), benign I10 401. 1    
5. PFO (patent foramen ovale) Q21.1 745. 5    
6. [facility] I63.9 434.91    
  
  
 PLAN  
  
Complex ventricular ectopy - NSVT, multifocal PVCs, largely asx. lexiscan cardiolite normal. Normal LVEF by gated nuclear and recent echo. - start toprol xl 25 mg qhs 
- refer to Dr Costa Rincon 
PFO, prominent right to left shunting by bubble study  
- refer to Dr Ace Todd for consideration of percutaneous closure 
  
Moderate left ICA disease 
- continue aspirin and statin Rx 
  
Left frontal/parietal infarction April 2637, embolic pattern. Etiology includes PFO vs atherothrombotic due to moderate left ICA disease. No AF by event recorder 
- continue aspirin 
- structural heart referral as noted above 
  
COPD/nicotine use - smoke free since stroke 
 
 
 Requesting/referring provider: Mariela Linear Reason for Consult: Stroke/PFO 
  
Carole note HPI: Shavon Nguyen, a 63y.o. year-old who presents for evaluation of PFO in the setting of stroke. Ms. Dipesh Raya has history of hypertension, diabetes, tobacco abuse. She recently suffered a stroke involving left hemiparesis, numbness on the face and speech disturbance. She subsequently has improved from her stroke. As a part of her stroke work-up she underwent evaluation for extra carotid disease. 3 days ago she reported an incidence of transient hand and face numbness which lasted for less than a minute. Otherwise she does not report any recurrent symptoms. She also has prior history of chest pain and hypertension for which she has seen Dr. Rio Ku and continues to do well 
  
  
  Investigations personally reviewed by me: While she has moderate bilateral carotid stenosis no critical lesion was identified. Her LV systolic function is normal.  30-day event monitor demonstrated no atrial fibrillation burden. Echocardiogram demonstrated intracardiac atrial level shunt likely from a PFO. Atrial septal aneurysm was also noted. Assessment/Plan: 1. PFO/atrial level intracardiac shunt with no evidence of right atrial or ventricular enlargement 2. Recent stroke with MRI suggestive of possible embolic etiology involving the left frontal and middle lobes 3. Hypertension 4. Diabetes mellitus 5.   History of tobacco abuse recently quit after 30-pack-year smoking when she had a stroke 
  
 Mrs. Willie Felix was  evaluated at the request of Dr. Aashish Grande for recent stroke and a PFO. She has multiple potential causes for stroke including hypertension diabetes and tobacco abuse in addition to likely PFO. She will benefit from management of all these causes. She has already quit smoking. She is on high intensity statin therapy. I will defer the management of her diabetes mellitus to her primary care physician. Blood pressures are now well controlled on medication. No clear evidence of atrial fibrillation has been found on extended rhythm monitoring.  
  
Her rope score is 4. I informed her that the maximum stroke reduction benefit would be provided by controlling hypertension, diabetes and cessation of smoking. Benefit of PFO closure in patients with low to intermediate rope score is uncertain. Finally cumulative benefits of PFO closure in such cases are long-term and not short-term. She expressed her willingness to attempt any therapy that may provide any kind of stroke risk reduction and would like to proceed with closure if anatomically suitable. I will set her up for a KRIS to identify anatomy of her PFO, suitability for percutaneous closure in the relative risks and benefits of closure. [place] note We will have patient hold her Toprol for several days and evaluate with her fatigue/dizziness resolves. If it does, will consider a trial of diltiazem versus an antiarrhythmic drug such as sotalol. However, if her fatigue/dizziness persist will restart Toprol however at 50 mg daily and plan to repeat a Holter monitor 2 weeks after adjusting her Toprol dose.   If she is changed to a new medication, will plan for a Holter 2 weeks after changing her medications. 
  
  
 We discussed the expected course, resolution and complications of the diagnosis(es) in detail. Medication risks, benefits, costs, interactions, and alternatives were discussed as indicated. I advised her to contact the office if her condition worsens, changes or fails to improve as anticipated. She expressed understanding with the diagnosis(es) and plan 
  
 FOLLOW-UP  
  
TBD; will contact patient in 3 days to evaluate response to holding metoprolol CARDIOLOGY OFFICE NOTE Stanley Patterson MD, [facility] [address] [place] [address] Nw Phone [phone]; Fax [phone] Mobile [phone]   Voice Mail [phone] LAST OFFICE VISIT : 8/28/2020 Rocael Eden NP 
 
  
ATTENTION:  
This medical record was transcribed using an electronic medical records/speech recognition system. Although proofread, it may and can contain electronic, spelling and other errors. Corrections may be executed at a later time. Please feel free to contact us for any clarifications as needed. No diagnosis found. Katherine Lynn is a 62 y.o. female with  referred for      . The patient denies chest pain/ shortness of breath, orthopnea, PND, LE edema, palpitations, syncope, presyncope or fatigue. Cardiac risk factors: {Causes; risk factors heart disease:510} I have personally obtained the history from the patient. HISTORY OF PRESENTING ILLNESS  
  
 
 
 
 ACTIVE PROBLEM LIST Patient Active Problem List  
 Diagnosis Date Noted  Patent foramen ovale 06/05/2020 Priority: 1 - One  
 ASD (atrial septal defect) 06/05/2020 Priority: 1 - One  Multifocal PVCs with pairing 05/05/2020  Weakness of right upper extremity 04/05/2020  Hypothyroidism 04/03/2020  Dyslipidemia 04/03/2020  COPD (chronic obstructive pulmonary disease) ([facility] [address] 04/03/2020  
 HTN (hypertension), benign 04/03/2020  Acute CVA (cerebrovascular accident) ([facility] [address] [facility] 04/03/2020  PFO (patent foramen ovale) 04/03/2020 PAST MEDICAL HISTORY Past Medical History:  
Diagnosis Date  Chronic obstructive pulmonary disease ([facility]  Diabetes ([facility] Borderline  Hypercholesteremia  Hypertension  [facility]  Thyroid disease PAST SURGICAL HISTORY Past Surgical History:  
Procedure Laterality Date  CARDIAC SURG PROCEDURE UNLIST    
 cath  HX APPENDECTOMY  HX CHOLECYSTECTOMY  HX GYN    
 hysterectomy ALLERGIES No Known Allergies FAMILY HISTORY Family History Problem Relation Age of Onset  Stroke Neg Hx   
 negative for cardiac disease SOCIAL HISTORY Social History Socioeconomic History  Marital status: SINGLE Spouse name: Not on file  Number of children: Not on file  Years of education: Not on file  Highest education level: Not on file Tobacco Use  Smoking status: Current Every Day Smoker Packs/day: 1.00  Smokeless tobacco: Never Used Substance and Sexual Activity  Alcohol use: Never Frequency: Never  Drug use: Never MEDICATIONS Current Outpatient Medications Medication Sig  
 metoprolol succinate (TOPROL-XL) 50 mg XL tablet Take 1 tablet by mouth once daily  traMADoL (ULTRAM) 50 mg tablet TAKE 1 TABLET BY MOUTH EVERY 6 HOURS AS NEEDED FOR 7 DAYS  albuterol-ipratropium (DUO-NEB) 2.5 mg-0.5 mg/3 ml nebu USE 1 AMPULE IN NEBULIZER EVERY 6 HOURS AS NEEDED FOR 30 DAYS  Chantix 0.5 mg tablet TAKE 1 TABLET BY MOUTH ON DAYS 1 3 1 TABLET TWICE DAILY ON DAYS 4 7 THEN 2 TABLETS TWICE DAILY FOR 30 DAYS  atorvastatin (LIPITOR) 80 mg tablet Take 1 Tab by mouth nightly.  ARIPiprazole (Abilify) 10 mg tablet 1 tablet  aspirin 81 mg chewable tablet Take 81 mg by mouth daily.  PARoxetine (PaxiL) 20 mg tablet Take 20 mg by mouth daily.  budesonide-formoteroL (Symbicort) 160-4.5 mcg/actuation HFAA Take 2 Puffs by inhalation two (2) times a day.  levothyroxine (SYNTHROID) 175 mcg tablet Take 175 mcg by mouth Daily (before breakfast).  risedronate (ActoneL) 35 mg tablet Take 35 mg by mouth every seven (7) days.  albuterol (PROVENTIL HFA, VENTOLIN HFA, PROAIR HFA) 90 mcg/actuation inhaler Take 2 Puffs by inhalation every six (6) hours as needed for Wheezing. No current facility-administered medications for this visit. Facility-Administered Medications Ordered in Other Visits Medication Dose Route Frequency  sodium chloride (NS) flush 10 mL  10 mL IntraVENous PRN I have reviewed the nurses notes, vitals, problem list, allergy list, medical history, family, social history and medications. REVIEW OF SYMPTOMS General: Pt denies excessive weight gain or loss. Pt is able to conduct ADL's HEENT: Denies blurred vision, headaches, hearing loss, epistaxis and difficulty swallowing. Respiratory: Denies cough, congestion, shortness of breath, KNOX, wheezing or stridor. Cardiovascular: Denies precordial pain, palpitations, edema or PND Gastrointestinal: Denies poor appetite, indigestion, abdominal pain or blood in stool Genitourinary: Denies hematuria, dysuria, increased urinary frequency Musculoskeletal: Denies joint pain or swelling from muscles or joints Neurologic: Denies tremor, paresthesias, headache, or sensory motor disturbance Psychiatric: Denies confusion, insomnia, depression Integumentray: Denies rash, itching or ulcers. Hematologic: Denies easy bruising, bleeding PHYSICAL EXAMINATION There were no vitals filed for this visit. General: Well developed, in no acute distress. HEENT: No jaundice, oral mucosa moist, no oral ulcers Neck: Supple, no stiffness, no lymphadenopathy, supple Heart:  Normal S1/S2 negative S3 or S4. Regular, no murmur, gallop or rub, no jugular venous distention Respiratory: Clear bilaterally x 4, no wheezing or rales Abdomen:   Soft, non-tender, bowel sounds are active. Extremities:  No edema, normal cap refill, no cyanosis. Musculoskeletal: No clubbing, no deformities Neuro: A&Ox3, speech clear, gait stable, cooperative, no focal neurologic deficits Skin: Skin color is normal. No rashes or lesions. Non diaphoretic, moist. 
Vascular: 2+ pulses symmetric in all extremities EKG: *** DIAGNOSTIC DATA Neck CTA 4/2020 - moderate bilateral ICA disease (left 60%) Echo 4/3/20 - EF 60%, PFO significant right to left shunt Venous duplex 4/4/20 - no DVT Event recorder April 2020 - frequent PVCs, multifocal, short runs NSVT Lexiscan cardiolite 5/1/20 - normal perfusion, EF 61% Echo 8/31/20 - EF 60-65%. Normal cavity size and wall thickness. No WMA. 30 mm [place] Cardioform device closure device present in expected position without residual shunting. LABORATORY DATA Lab Results Component Value Date/Time WBC 12.2 (H) 06/06/2020 03:51 AM  
 HGB 13.4 06/06/2020 03:51 AM  
 HCT 40.6 06/06/2020 03:51 AM  
 PLATELET 281 52/93/3275 03:51 AM  
 MCV 98.5 06/06/2020 03:51 AM  
  
Lab Results Component Value Date/Time Sodium 138 06/06/2020 03:51 AM  
 Potassium 3.9 06/06/2020 03:51 AM  
 Chloride 110 (H) 06/06/2020 03:51 AM  
 CO2 22 06/06/2020 03:51 AM  
 Anion gap 6 06/06/2020 03:51 AM  
 Glucose 111 (H) 06/06/2020 03:51 AM  
 BUN 7 06/06/2020 03:51 AM  
 Creatinine 0.56 06/06/2020 03:51 AM  
 BUN/Creatinine ratio 13 06/06/2020 03:51 AM  
 GFR est AA >60 06/06/2020 03:51 AM  
 GFR est non-AA >60 06/06/2020 03:51 AM  
 Calcium 8.4 (L) 06/06/2020 03:51 AM  
 Bilirubin, total 0.2 04/03/2020 12:04 PM  
 Alk.  phosphatase 79 04/03/2020 12:04 PM  
 Protein, total 7.6 04/03/2020 12:04 PM  
 Albumin 3.8 04/03/2020 12:04 PM  
 Globulin 3.8 04/03/2020 12:04 PM  
 A-G Ratio 1.0 (L) 04/03/2020 12:04 PM  
 ALT (SGPT) 20 04/03/2020 12:04 PM  
  
 
 
 ASSESSMENT/RECOMMENDATIONS:.  
  
1.  
2.  
3.  
 
No orders of the defined types were placed in this encounter. We discussed the expected course, resolution and complications of the diagnosis(es) in detail. Medication risks, benefits, costs, interactions, and alternatives were discussed as indicated. I advised him to contact the office if his condition worsens, changes or fails to improve as anticipated. He expressed understanding with the diagnosis(es) and plan I have discussed the diagnosis with  Adiel Mahoney and the intended plan as seen in the above orders. Questions were answered concerning future plans. I have discussed medication side effects and warnings with the patient as well. Thank you,  Jade Rutledge NP for involving me in the care of  Adiel Mahoney. Please do not hesitate to contact me for further questions/concerns. Stanley Prakash MD, [facility] - [address], [facility] [address]     
[phone] / [phone] Fax

## 2021-01-07 ENCOUNTER — OFFICE VISIT (OUTPATIENT)
Dept: CARDIOLOGY CLINIC | Age: 61
End: 2021-01-07

## 2021-01-07 DIAGNOSIS — E78.2 MIXED HYPERLIPIDEMIA: ICD-10-CM

## 2021-01-07 DIAGNOSIS — Q21.12 PFO (PATENT FORAMEN OVALE): Primary | ICD-10-CM

## 2021-01-07 DIAGNOSIS — E11.9 TYPE 2 DIABETES MELLITUS WITHOUT COMPLICATION, WITHOUT LONG-TERM CURRENT USE OF INSULIN (HCC): ICD-10-CM

## 2021-01-07 DIAGNOSIS — I49.3 PVC (PREMATURE VENTRICULAR CONTRACTION): ICD-10-CM

## 2021-01-07 DIAGNOSIS — J44.9 CHRONIC OBSTRUCTIVE PULMONARY DISEASE, UNSPECIFIED COPD TYPE (HCC): ICD-10-CM

## 2021-01-07 DIAGNOSIS — I10 ESSENTIAL HYPERTENSION: ICD-10-CM

## 2021-01-07 DIAGNOSIS — I63.9 ACUTE CVA (CEREBROVASCULAR ACCIDENT) (HCC): ICD-10-CM

## 2021-01-12 ENCOUNTER — OFFICE VISIT (OUTPATIENT)
Dept: CARDIOLOGY CLINIC | Age: 61
End: 2021-01-12
Payer: MEDICAID

## 2021-01-12 VITALS
BODY MASS INDEX: 26.31 KG/M2 | HEART RATE: 80 BPM | SYSTOLIC BLOOD PRESSURE: 110 MMHG | DIASTOLIC BLOOD PRESSURE: 60 MMHG | WEIGHT: 134 LBS | HEIGHT: 60 IN

## 2021-01-12 DIAGNOSIS — I10 ESSENTIAL HYPERTENSION: ICD-10-CM

## 2021-01-12 DIAGNOSIS — J44.9 CHRONIC OBSTRUCTIVE PULMONARY DISEASE, UNSPECIFIED COPD TYPE (HCC): ICD-10-CM

## 2021-01-12 DIAGNOSIS — E78.2 MIXED HYPERLIPIDEMIA: ICD-10-CM

## 2021-01-12 DIAGNOSIS — I63.9 ACUTE CVA (CEREBROVASCULAR ACCIDENT) (HCC): ICD-10-CM

## 2021-01-12 DIAGNOSIS — I49.3 PVC (PREMATURE VENTRICULAR CONTRACTION): ICD-10-CM

## 2021-01-12 DIAGNOSIS — Q21.12 PFO (PATENT FORAMEN OVALE): Primary | ICD-10-CM

## 2021-01-12 DIAGNOSIS — E11.9 TYPE 2 DIABETES MELLITUS WITHOUT COMPLICATION, WITHOUT LONG-TERM CURRENT USE OF INSULIN (HCC): ICD-10-CM

## 2021-01-12 DIAGNOSIS — E78.5 DYSLIPIDEMIA: ICD-10-CM

## 2021-01-12 PROCEDURE — 99214 OFFICE O/P EST MOD 30 MIN: CPT | Performed by: SPECIALIST

## 2021-01-12 RX ORDER — LEVOTHYROXINE SODIUM 150 UG/1
125 TABLET ORAL
COMMUNITY
End: 2021-08-04

## 2021-01-12 RX ORDER — CLOPIDOGREL BISULFATE 75 MG/1
TABLET ORAL
COMMUNITY
End: 2021-08-04

## 2021-01-12 NOTE — Clinical Note
1) stop Plavix after prescription runs out    2) get cholesterol checked in the next several weeks    3) LDL goal is less than 100 and your is 117  At a lab basia fasting    4) follow up in 3 mo.

## 2021-01-12 NOTE — PROGRESS NOTES
CARDIOLOGY OFFICE NOTE    Stanley Vides MD, [address], [place], [address] [facility]  Phone [phone]; Fax [phone]  Mobile [phone]   Voice Mail [phone]    LAST OFFICE VISIT : 8/28/2020  Pawel May NP       ATTENTION:   This medical record was transcribed using an electronic medical records/speech recognition system. Although proofread, it may and can contain electronic, spelling and other errors. Corrections may be executed at a later time. Please feel free to contact us for any clarifications as needed. ICD-10-CM ICD-9-CM   1. PFO (patent foramen ovale)  Q21.1 745.5   2. PVC (premature ventricular contraction)  I49.3 427.69   3. Essential hypertension  I10 401.9   4. Mixed hyperlipidemia  E78.2 272.2   5. Chronic obstructive pulmonary disease, unspecified COPD type ([facility]  J44.9 496   6. Acute CVA (cerebrovascular accident) ([facility]  I63.9 434.91   7. Type 2 diabetes mellitus without complication, without long-term current use of insulin (HCC)  E11.9 250.00   8. Dyslipidemia  E78.5 272.4            Paige Porter is a 62 y.o. female with  referred for   PFO hypertension and dyslipidemia   . The patient denies chest pain/ shortness of breath, orthopnea, PND, LE edema, palpitations, syncope,     presyncope or fatigue. Cardiac risk factors: smoking/ tobacco exposure, dyslipidemia, hypertension, post-menopausal  I have personally obtained the history from the patient. Massieluziel Rodriguez      Mrs. Brandie Quinones was followed by Dr. Mak Myers in the past she is a 32-year-old lady who has a history of a left frontoparietal stroke with dysarthria and right arm weakness. She did have a MRI of her head that showed a left frontoparietal infarct suggestive of embolic etiology. She had an echo at that time that showed a significant patent foramen ovale that was outjodedlog. felt to be cardioembolic.   She subsequently underwent a PFO closure on 6/5/2020. This was performed by Dr. Cathleen Hutson.   At that time she stopped smoking    She is doing well and has gotten more movement in her right arm          ACTIVE PROBLEM LIST     Patient Active Problem List    Diagnosis Date Noted    Patent foramen ovale 06/05/2020     Priority: 1 - One    ASD (atrial septal defect) 06/05/2020     Priority: 1 - One    Multifocal PVCs with pairing 05/05/2020    Weakness of right upper extremity 04/05/2020    Hypothyroidism 04/03/2020    Dyslipidemia 04/03/2020    COPD (chronic obstructive pulmonary disease) ([facility] 04/03/2020    HTN (hypertension), benign 04/03/2020    Acute CVA (cerebrovascular accident) ([facility] 04/03/2020    PFO (patent foramen ovale) 04/03/2020           PAST MEDICAL HISTORY     Past Medical History:   Diagnosis Date    Chronic obstructive pulmonary disease ([facility]     Diabetes ([facility]     Borderline    Hypercholesteremia     Hypertension     [facility]     Thyroid disease            PAST SURGICAL HISTORY     Past Surgical History:   Procedure Laterality Date    CARDIAC SURG PROCEDURE UNLIST      cath    HX APPENDECTOMY      HX CHOLECYSTECTOMY      HX GYN      hysterectomy          ALLERGIES     No Known Allergies       FAMILY HISTORY     Family History   Problem Relation Age of Onset    Stroke Neg Hx     negative for cardiac disease       SOCIAL HISTORY     Social History     Socioeconomic History    Marital status: SINGLE     Spouse name: Not on file    Number of children: Not on file    Years of education: Not on file    Highest education level: Not on file   Tobacco Use    Smoking status: Current Every Day Smoker     Packs/day: 1.00    Smokeless tobacco: Never Used   Substance and Sexual Activity    Alcohol use: Never     Frequency: Never    Drug use: Never         MEDICATIONS     Current Outpatient Medications   Medication Sig    metoprolol succinate (TOPROL-XL) 50 mg XL tablet Take 1 tablet by mouth once daily    traMADoL (ULTRAM) 50 mg tablet TAKE 1 TABLET BY MOUTH EVERY 6 HOURS AS NEEDED FOR 7 DAYS    albuterol-ipratropium (DUO-NEB) 2.5 mg-0.5 mg/3 ml nebu USE 1 AMPULE IN NEBULIZER EVERY 6 HOURS AS NEEDED FOR 30 DAYS    Chantix 0.5 mg tablet TAKE 1 TABLET BY MOUTH ON DAYS 1 3 1 TABLET TWICE DAILY ON DAYS 4 7 THEN 2 TABLETS TWICE DAILY FOR 30 DAYS    atorvastatin (LIPITOR) 80 mg tablet Take 1 Tab by mouth nightly.  ARIPiprazole (Abilify) 10 mg tablet 1 tablet    aspirin 81 mg chewable tablet Take 81 mg by mouth daily.  PARoxetine (PaxiL) 20 mg tablet Take 20 mg by mouth daily.  budesonide-formoteroL (Symbicort) 160-4.5 mcg/actuation HFAA Take 2 Puffs by inhalation two (2) times a day.  levothyroxine (SYNTHROID) 175 mcg tablet Take 175 mcg by mouth Daily (before breakfast).  risedronate (ActoneL) 35 mg tablet Take 35 mg by mouth every seven (7) days.  albuterol (PROVENTIL HFA, VENTOLIN HFA, PROAIR HFA) 90 mcg/actuation inhaler Take 2 Puffs by inhalation every six (6) hours as needed for Wheezing. No current facility-administered medications for this visit. Facility-Administered Medications Ordered in Other Visits   Medication Dose Route Frequency    sodium chloride (NS) flush 10 mL  10 mL IntraVENous PRN       I have reviewed the nurses notes, vitals, problem list, allergy list, medical history, family, social history and medications. REVIEW OF SYMPTOMS      General: Pt denies excessive weight gain or loss. Pt is able to conduct ADL's  HEENT: Denies blurred vision, headaches, hearing loss, epistaxis and difficulty swallowing. Respiratory: Denies cough, congestion, shortness of breath, KNOX, wheezing or stridor.   Cardiovascular: Denies precordial pain, palpitations, edema or PND  Gastrointestinal: Denies poor appetite, indigestion, abdominal pain or blood in stool  Genitourinary: Denies hematuria, dysuria, increased urinary frequency  Musculoskeletal: Denies joint pain or swelling from muscles or joints  Neurologic: Denies tremor, paresthesias, headache, or sensory motor disturbance  Psychiatric: Denies confusion, insomnia, depression  Integumentray: Denies rash, itching or ulcers. Hematologic: Denies easy bruising, bleeding     PHYSICAL EXAMINATION      There were no vitals filed for this visit. General: Well developed, in no acute distress. HEENT: No jaundice, oral mucosa moist, no oral ulcers  Neck: Supple, no stiffness, no lymphadenopathy, supple  Heart:  Normal S1/S2 negative S3 or S4. Regular, no murmur, gallop or rub, no jugular venous distention  Respiratory: Clear bilaterally x 4, no wheezing or rales  Abdomen:   Soft, non-tender, bowel sounds are active. Extremities:  No edema, normal cap refill, no cyanosis. Musculoskeletal: No clubbing, no deformities  Neuro: A&Ox3, speech clear, gait stable, cooperative, no focal neurologic deficits  Skin: Skin color is normal. No rashes or lesions. Non diaphoretic, moist.  Vascular: 2+ pulses symmetric in all extremities           DIAGNOSTIC DATA     1. Neck CTA 4/2020   - moderate bilateral ICA disease (left 60%)     2. Echo   4/3/20 - EF 60%, PFO significant right to left shunt   8/31/20 - EF 60-65%. Normal cavity size and wall thickness. No WMA. 30 mm Whitewright Cardioform device closure device present in expected position without residual shunting. 3. Venous duplex    4/4/20 - no DVT     4. Event recorder    April 2020 - frequent PVCs, multifocal, short runs NSVT     5. Stress Test   Lexiscan cardiolite 5/1/20 - normal perfusion, EF 61%     6. Lipids   4/5/20- , HDL 43, .8,        7. PFO Closure -6/5/20   NO pulmonary HTN   Small sized Fossa ovalis defect/ASD with previously document bidirectional shunt and stroke   ICE guided closure of the ASD with 30 mm Whitewright cardioform device.          LABORATORY DATA            Lab Results   Component Value Date/Time    WBC 12.2 (H) 06/06/2020 03:51 AM    HGB 13.4 06/06/2020 03:51 AM    HCT 40.6 06/06/2020 03:51 AM    PLATELET 339 47/75/2128 03:51 AM    MCV 98.5 06/06/2020 03:51 AM      Lab Results   Component Value Date/Time    Sodium 138 06/06/2020 03:51 AM    Potassium 3.9 06/06/2020 03:51 AM    Chloride 110 (H) 06/06/2020 03:51 AM    CO2 22 06/06/2020 03:51 AM    Anion gap 6 06/06/2020 03:51 AM    Glucose 111 (H) 06/06/2020 03:51 AM    BUN 7 06/06/2020 03:51 AM    Creatinine 0.56 06/06/2020 03:51 AM    BUN/Creatinine ratio 13 06/06/2020 03:51 AM    GFR est AA >60 06/06/2020 03:51 AM    GFR est non-AA >60 06/06/2020 03:51 AM    Calcium 8.4 (L) 06/06/2020 03:51 AM    Bilirubin, total 0.2 04/03/2020 12:04 PM    Alk. phosphatase 79 04/03/2020 12:04 PM    Protein, total 7.6 04/03/2020 12:04 PM    Albumin 3.8 04/03/2020 12:04 PM    Globulin 3.8 04/03/2020 12:04 PM    A-G Ratio 1.0 (L) 04/03/2020 12:04 PM    ALT (SGPT) 20 04/03/2020 12:04 PM           ASSESSMENT/RECOMMENDATIONS:.     1) PFO complicated by left frontoparietal strokes status post closure 6/5/2020  -Okay to stop Plavix after last prescription per Dr. Valery Armenta    2)COPD  -Is attempting to stop smoking and is using a nicotine patch currently    3)Complex ventricular ectopy   -Myocardial perfusion study and EF normal  -She is tolerating the metoprolol at 50 mg a day  -Followed by Dr. Emily Denver    4) carotid disease  Moderate in nature we will work on controlling cholesterol           5) dyslipidemia LDL not at goal at 117 and if it remains elevated I would switch her Lipitor to Crestor 40  -Gave her a requisition have her cholesterol checked       6) hypertension  Under good control currently continue current medical regimen      No orders of the defined types were placed in this encounter. We discussed the expected course, resolution and complications of the diagnosis(es) in detail. Medication risks, benefits, costs, interactions, and alternatives were discussed as indicated.   I advised him to contact the office if his condition worsens, changes or fails to improve as anticipated. He expressed understanding with the diagnosis(es) and plan          Follow-up and Dispositions  ·   Return in about 6 months (around 7/12/2021). I have discussed the diagnosis with  Kendra Laurent and the intended plan as seen in the above orders. Questions were answered concerning future plans. I have discussed medication side effects and warnings with the patient as well. Thank you,  Cheryal Schaumann, NP for involving me in the care of  Kendra Laurent. Please do not hesitate to contact me for further questions/concerns. Stanley Patterson MD, [facility] [address]      [facility], [address] [place] [address] [address]      [phone] / [phone] Fax

## 2021-01-12 NOTE — PROGRESS NOTES
Visit Vitals  /60   Pulse 80   Ht 5' (1.524 m)   Wt 134 lb (60.8 kg)   BMI 26.17 kg/m²     Pt was previous pt of Dr Aashish Grande

## 2021-06-24 ENCOUNTER — HOSPITAL ENCOUNTER (EMERGENCY)
Age: 61
Discharge: HOME | End: 2021-06-24
Attending: EMERGENCY MEDICINE
Payer: MEDICAID

## 2021-06-24 ENCOUNTER — APPOINTMENT (OUTPATIENT)
Dept: GENERAL RADIOLOGY | Age: 61
End: 2021-06-24
Attending: PHYSICIAN ASSISTANT
Payer: MEDICAID

## 2021-06-24 ENCOUNTER — TELEPHONE (OUTPATIENT)
Dept: CARDIOLOGY CLINIC | Age: 61
End: 2021-06-24

## 2021-06-24 VITALS
DIASTOLIC BLOOD PRESSURE: 72 MMHG | HEIGHT: 60 IN | BODY MASS INDEX: 26.9 KG/M2 | SYSTOLIC BLOOD PRESSURE: 122 MMHG | RESPIRATION RATE: 16 BRPM | TEMPERATURE: 97.3 F | WEIGHT: 137 LBS | HEART RATE: 86 BPM | OXYGEN SATURATION: 94 %

## 2021-06-24 DIAGNOSIS — R07.89 CHEST TIGHTNESS: ICD-10-CM

## 2021-06-24 DIAGNOSIS — I49.3 FREQUENT PVCS: Primary | ICD-10-CM

## 2021-06-24 LAB
ALBUMIN SERPL-MCNC: 3.3 G/DL (ref 3.5–5)
ALBUMIN/GLOB SERPL: 1 {RATIO} (ref 1.1–2.2)
ALP SERPL-CCNC: 95 U/L (ref 45–117)
ALT SERPL-CCNC: 32 U/L (ref 12–78)
ANION GAP SERPL CALC-SCNC: 5 MMOL/L (ref 5–15)
AST SERPL-CCNC: 18 U/L (ref 15–37)
ATRIAL RATE: 89 BPM
BASOPHILS # BLD: 0 K/UL (ref 0–0.1)
BASOPHILS NFR BLD: 0 % (ref 0–1)
BILIRUB SERPL-MCNC: 0.5 MG/DL (ref 0.2–1)
BNP SERPL-MCNC: 136 PG/ML
BUN SERPL-MCNC: 11 MG/DL (ref 6–20)
BUN/CREAT SERPL: 19 (ref 12–20)
CALCIUM SERPL-MCNC: 8.6 MG/DL (ref 8.5–10.1)
CALCULATED P AXIS, ECG09: 32 DEGREES
CALCULATED T AXIS, ECG11: 60 DEGREES
CHLORIDE SERPL-SCNC: 110 MMOL/L (ref 97–108)
CO2 SERPL-SCNC: 24 MMOL/L (ref 21–32)
CREAT SERPL-MCNC: 0.59 MG/DL (ref 0.55–1.02)
DIAGNOSIS, 93000: NORMAL
DIFFERENTIAL METHOD BLD: ABNORMAL
EOSINOPHIL # BLD: 0.2 K/UL (ref 0–0.4)
EOSINOPHIL NFR BLD: 2 % (ref 0–7)
ERYTHROCYTE [DISTWIDTH] IN BLOOD BY AUTOMATED COUNT: 13.7 % (ref 11.5–14.5)
GLOBULIN SER CALC-MCNC: 3.3 G/DL (ref 2–4)
GLUCOSE SERPL-MCNC: 92 MG/DL (ref 65–100)
HCT VFR BLD AUTO: 42.9 % (ref 35–47)
HGB BLD-MCNC: 14.5 G/DL (ref 11.5–16)
IMM GRANULOCYTES # BLD AUTO: 0.1 K/UL (ref 0–0.04)
IMM GRANULOCYTES NFR BLD AUTO: 1 % (ref 0–0.5)
LYMPHOCYTES # BLD: 3 K/UL (ref 0.8–3.5)
LYMPHOCYTES NFR BLD: 31 % (ref 12–49)
MAGNESIUM SERPL-MCNC: 2 MG/DL (ref 1.6–2.4)
MCH RBC QN AUTO: 32.2 PG (ref 26–34)
MCHC RBC AUTO-ENTMCNC: 33.8 G/DL (ref 30–36.5)
MCV RBC AUTO: 95.1 FL (ref 80–99)
MONOCYTES # BLD: 1 K/UL (ref 0–1)
MONOCYTES NFR BLD: 10 % (ref 5–13)
NEUTS SEG # BLD: 5.5 K/UL (ref 1.8–8)
NEUTS SEG NFR BLD: 56 % (ref 32–75)
NRBC # BLD: 0 K/UL (ref 0–0.01)
NRBC BLD-RTO: 0 PER 100 WBC
P-R INTERVAL, ECG05: 128 MS
PLATELET # BLD AUTO: 208 K/UL (ref 150–400)
PMV BLD AUTO: 9.8 FL (ref 8.9–12.9)
POTASSIUM SERPL-SCNC: 4.1 MMOL/L (ref 3.5–5.1)
PROT SERPL-MCNC: 6.6 G/DL (ref 6.4–8.2)
Q-T INTERVAL, ECG07: 358 MS
QRS DURATION, ECG06: 76 MS
QTC CALCULATION (BEZET), ECG08: 435 MS
RBC # BLD AUTO: 4.51 M/UL (ref 3.8–5.2)
SODIUM SERPL-SCNC: 139 MMOL/L (ref 136–145)
TROPONIN I SERPL-MCNC: <0.05 NG/ML
TROPONIN I SERPL-MCNC: <0.05 NG/ML
VENTRICULAR RATE, ECG03: 89 BPM
WBC # BLD AUTO: 9.8 K/UL (ref 3.6–11)

## 2021-06-24 PROCEDURE — 80053 COMPREHEN METABOLIC PANEL: CPT

## 2021-06-24 PROCEDURE — 83880 ASSAY OF NATRIURETIC PEPTIDE: CPT

## 2021-06-24 PROCEDURE — 71046 X-RAY EXAM CHEST 2 VIEWS: CPT

## 2021-06-24 PROCEDURE — 74011250637 HC RX REV CODE- 250/637: Performed by: EMERGENCY MEDICINE

## 2021-06-24 PROCEDURE — 93005 ELECTROCARDIOGRAM TRACING: CPT

## 2021-06-24 PROCEDURE — 84484 ASSAY OF TROPONIN QUANT: CPT

## 2021-06-24 PROCEDURE — 85025 COMPLETE CBC W/AUTO DIFF WBC: CPT

## 2021-06-24 PROCEDURE — 36415 COLL VENOUS BLD VENIPUNCTURE: CPT

## 2021-06-24 PROCEDURE — 83735 ASSAY OF MAGNESIUM: CPT

## 2021-06-24 PROCEDURE — 99285 EMERGENCY DEPT VISIT HI MDM: CPT

## 2021-06-24 RX ORDER — METOPROLOL SUCCINATE 25 MG/1
25 TABLET, EXTENDED RELEASE ORAL
Status: DISCONTINUED | OUTPATIENT
Start: 2021-06-24 | End: 2021-06-24

## 2021-06-24 RX ORDER — METOPROLOL SUCCINATE 25 MG/1
75 TABLET, EXTENDED RELEASE ORAL
Status: COMPLETED | OUTPATIENT
Start: 2021-06-24 | End: 2021-06-24

## 2021-06-24 RX ORDER — METOPROLOL SUCCINATE 50 MG/1
75 TABLET, EXTENDED RELEASE ORAL DAILY
Qty: 30 TABLET | Refills: 0 | Status: SHIPPED | OUTPATIENT
Start: 2021-06-24 | End: 2021-07-24

## 2021-06-24 RX ADMIN — METOPROLOL SUCCINATE 75 MG: 25 TABLET, EXTENDED RELEASE ORAL at 14:07

## 2021-06-24 NOTE — ED NOTES
Discharged by Dr. Ciaran Garcia, RN, with verbal and written instructions provided to pt. Patient ambulated out of ED accompanied by self.

## 2021-06-24 NOTE — ED PROVIDER NOTES
60-year-old female with a history of COPD, HTN, HLD, prior CVA, on aspirin with a history of prior intermittent palpitations presents to the emergency department noting a constant chest pressure sensation accompanied by palpitations and mild shortness of breath since yesterday evening. The symptoms came on while at rest and have been persistent since the onset. She notes some mild intermittent nausea but denies any currently. No vomiting, abdominal pain, diarrhea, cough, pleurisy, fever, URI symptoms, or any other medical concerns. She was noted to have frequent ectopy on cardiac monitor on arrival.  She denies any significant caffeine intake or EtOH or other stimulant type medication intake.            Past Medical History:   Diagnosis Date    [facility] disease ([facility]     Diabetes ([facility]     Borderline    Hypercholesteremia     Hypertension     Stroke ([facility]     Thyroid disease        Past Surgical History:   Procedure Laterality Date    HX APPENDECTOMY      HX CHOLECYSTECTOMY      HX GYN      hysterectomy    GA CARDIAC SURG PROCEDURE UNLIST      cath         Family History:   Problem Relation Age of Onset    Stroke Neg Hx        Social History     Socioeconomic History    Marital status: SINGLE     Spouse name: Not on file    Number of children: Not on file    Years of education: Not on file    Highest education level: Not on file   Occupational History    Not on file   Tobacco Use    Smoking status: Current Every Day Smoker     Packs/day: 1.00    Smokeless tobacco: Never Used   Substance and Sexual Activity    Alcohol use: Never    Drug use: Never    Sexual activity: Not on file   Other Topics Concern    Not on file   Social History Narrative    Not on file     Social Determinants of Health     Financial Resource Strain:     Difficulty of Paying Living Expenses:    Food Insecurity:     Worried About Running Out of Food in the Last Year:     [address] in the Last Year:    Transportation Needs:     Lack of Transportation (Medical):  Lack of Transportation (Non-Medical):    Physical Activity:     Days of Exercise per Week:     Minutes of Exercise per Session:    Stress:     Feeling of Stress :    Social Connections:     Frequency of Communication with Friends and Family:     Frequency of Social Gatherings with Friends and Family:     Attends [employer] Services:     Active Member of Clubs or Organizations:     Attends Club or Organization Meetings:     Marital Status:    Intimate Partner Violence:     Fear of Current or Ex-Partner:     Emotionally Abused:     Physically Abused:     Sexually Abused: ALLERGIES: Patient has no known allergies. Review of Systems   Constitutional: Negative for activity change, appetite change, chills and fever. HENT: Negative for congestion, rhinorrhea, sinus pressure, sneezing and sore throat. Eyes: Negative for photophobia and visual disturbance. Respiratory: Positive for chest tightness and shortness of breath. Negative for cough. Cardiovascular: Negative for chest pain. Gastrointestinal: Positive for nausea. Negative for abdominal pain, blood in stool, constipation, diarrhea and vomiting. Genitourinary: Negative for difficulty urinating, dysuria, flank pain, frequency, hematuria, menstrual problem, urgency, vaginal bleeding and vaginal discharge. Musculoskeletal: Negative for arthralgias, back pain, myalgias and neck pain. Skin: Negative for rash and wound. Neurological: Negative for syncope, weakness, numbness and headaches. Psychiatric/Behavioral: Negative for self-injury and suicidal ideas. All other systems reviewed and are negative. Vitals:    06/24/21 1135 06/24/21 1205   BP: (!) 103/51 117/65   Pulse: 70 80   Resp: 16 19   Temp: 97.3 °F (36.3 °C)    SpO2: 96% 95%   Weight: 62.1 kg (137 lb)    Height: 5' (1.524 m)             Physical Exam  Vitals and nursing note reviewed. Constitutional:       General: She is not in acute distress. Appearance: Normal appearance. She is well-developed. She is not diaphoretic. HENT:      Head: Normocephalic and atraumatic. Nose: Nose normal.   Eyes:      Extraocular Movements: Extraocular movements intact. Conjunctiva/sclera: Conjunctivae normal.      Pupils: Pupils are equal, round, and reactive to light. Cardiovascular:      Rate and Rhythm: Normal rate. Rhythm irregular. Heart sounds: Normal heart sounds. Pulmonary:      Effort: Pulmonary effort is normal.      Breath sounds: Wheezing (Scattered, mild, known COPD) present. Abdominal:      General: There is no distension. Palpations: Abdomen is soft. Tenderness: There is no abdominal tenderness. Musculoskeletal:         General: No tenderness. Cervical back: Neck supple. Right lower leg: No edema. Left lower leg: No edema. Skin:     General: Skin is warm and dry. Neurological:      General: No focal deficit present. Mental Status: She is alert and oriented to person, place, and time. Cranial Nerves: No cranial nerve deficit. Sensory: No sensory deficit. Motor: No weakness. Coordination: Coordination normal.          [facility]  ED Course as of Jun 24 1233   Thu Jun 24, 2021   1137 11:37 AM  I have evaluated the patient as the Provider in Triage. I have reviewed Her vital signs and the triage nurse assessment. I have talked with the patient and any available family and advised that I am the provider in triage and have ordered the appropriate study to initiate their work up based on the clinical presentation during my assessment. I have advised that the patient will be accommodated in the Main ED as soon as possible. I have also requested to contact the triage nurse or myself immediately if the patient experiences any changes in their condition during this brief waiting period.   Clyde Denver, PA-C          [EK]      ED Course User Index  [EK] Alicia BIANCA Neil     60-year-old female presents with chest tightness sensation since last night. Noted to have frequent PVCs on the monitor, likely etiology for her symptoms. She is afebrile with vital signs stable no acute distress    Labs returned reassuringly showing no significant abnormalities, normal electrolytes, negative troponin x2    CXR viewed by myself and read by radiology showing no acute abnormalities. Her case was discussed with Dr. Festus Beck, cardiology who recommends increasing her metoprolol succinate to 75 mg daily up from 50 mg. This dose was given to her in the ED and she was Rx for same. Recommended close follow-up with her cardiologist, Dr. Ovidio Lemus.  Return precautions were given for worsening or concerns. This plan was discussed with the patient at the bedside and she stated both understanding and agreement. Procedures    1123 EKG shows normal sinus rhythm with frequent PVCs with a rate of 89 bpm with no acute ST or T wave abnormalities suggestive of ischemia.

## 2021-06-24 NOTE — ED TRIAGE NOTES
Pt sent by PCP for left sided chest pressure starting last night that is worse this morning. Reports nausea. Denies SOB.   Reports previous MI.

## 2021-06-24 NOTE — TELEPHONE ENCOUNTER
Pt states she woke up with chest heaviness - similar to symptom to have PFO closure. Was in PCP office yesterday for hospital F/U for hosp stay 2 weeks ago for Bronchitis and EKG was HR 40's. - having faxed to office.      Discussed pt with Dr Kyree López - he wants her to go to ER

## 2021-08-04 ENCOUNTER — APPOINTMENT (OUTPATIENT)
Dept: GENERAL RADIOLOGY | Age: 61
DRG: 190 | End: 2021-08-04
Attending: EMERGENCY MEDICINE
Payer: MEDICAID

## 2021-08-04 ENCOUNTER — OFFICE VISIT (OUTPATIENT)
Dept: CARDIOLOGY CLINIC | Age: 61
End: 2021-08-04
Payer: MEDICAID

## 2021-08-04 ENCOUNTER — HOSPITAL ENCOUNTER (INPATIENT)
Age: 61
LOS: 6 days | Discharge: HOME | DRG: 190 | End: 2021-08-10
Attending: EMERGENCY MEDICINE | Admitting: HOSPITALIST
Payer: MEDICAID

## 2021-08-04 VITALS
RESPIRATION RATE: 16 BRPM | DIASTOLIC BLOOD PRESSURE: 80 MMHG | WEIGHT: 134 LBS | BODY MASS INDEX: 26.17 KG/M2 | HEART RATE: 123 BPM | SYSTOLIC BLOOD PRESSURE: 110 MMHG

## 2021-08-04 DIAGNOSIS — R00.0 RAPID HEART RATE: Primary | ICD-10-CM

## 2021-08-04 DIAGNOSIS — R07.9 CHEST PAIN: ICD-10-CM

## 2021-08-04 DIAGNOSIS — R06.02 SOB (SHORTNESS OF BREATH): ICD-10-CM

## 2021-08-04 DIAGNOSIS — I21.4 NSTEMI (NON-ST ELEVATED MYOCARDIAL INFARCTION) (HCC): Primary | ICD-10-CM

## 2021-08-04 DIAGNOSIS — R79.89 ELEVATED BRAIN NATRIURETIC PEPTIDE (BNP) LEVEL: ICD-10-CM

## 2021-08-04 PROBLEM — J44.1 COPD WITH ACUTE EXACERBATION (HCC): Status: ACTIVE | Noted: 2021-08-04

## 2021-08-04 PROBLEM — E11.9 DM (DIABETES MELLITUS), TYPE 2 (HCC): Status: ACTIVE | Noted: 2021-08-04

## 2021-08-04 PROBLEM — R06.89 ACUTE RESPIRATORY INSUFFICIENCY: Status: ACTIVE | Noted: 2021-08-04

## 2021-08-04 PROBLEM — R06.00 DYSPNEA: Status: ACTIVE | Noted: 2021-08-04

## 2021-08-04 LAB
ALBUMIN SERPL-MCNC: 3.6 G/DL (ref 3.5–5)
ALBUMIN/GLOB SERPL: 1 {RATIO} (ref 1.1–2.2)
ALP SERPL-CCNC: 98 U/L (ref 45–117)
ALT SERPL-CCNC: 26 U/L (ref 12–78)
ANION GAP SERPL CALC-SCNC: 3 MMOL/L (ref 5–15)
AST SERPL-CCNC: 19 U/L (ref 15–37)
BASOPHILS # BLD: 0 K/UL (ref 0–0.1)
BASOPHILS NFR BLD: 0 % (ref 0–1)
BILIRUB SERPL-MCNC: 0.2 MG/DL (ref 0.2–1)
BNP SERPL-MCNC: 7046 PG/ML
BUN SERPL-MCNC: 16 MG/DL (ref 6–20)
BUN/CREAT SERPL: 20 (ref 12–20)
CALCIUM SERPL-MCNC: 8.8 MG/DL (ref 8.5–10.1)
CHLORIDE SERPL-SCNC: 107 MMOL/L (ref 97–108)
CO2 SERPL-SCNC: 29 MMOL/L (ref 21–32)
COVID-19 RAPID TEST, COVR: NOT DETECTED
CREAT SERPL-MCNC: 0.82 MG/DL (ref 0.55–1.02)
DIFFERENTIAL METHOD BLD: ABNORMAL
EOSINOPHIL # BLD: 0 K/UL (ref 0–0.4)
EOSINOPHIL NFR BLD: 0 % (ref 0–7)
ERYTHROCYTE [DISTWIDTH] IN BLOOD BY AUTOMATED COUNT: 14.3 % (ref 11.5–14.5)
GLOBULIN SER CALC-MCNC: 3.6 G/DL (ref 2–4)
GLUCOSE SERPL-MCNC: 175 MG/DL (ref 65–100)
HCT VFR BLD AUTO: 43.8 % (ref 35–47)
HGB BLD-MCNC: 14.8 G/DL (ref 11.5–16)
IMM GRANULOCYTES # BLD AUTO: 0 K/UL (ref 0–0.04)
IMM GRANULOCYTES NFR BLD AUTO: 0 % (ref 0–0.5)
LYMPHOCYTES # BLD: 1.5 K/UL (ref 0.8–3.5)
LYMPHOCYTES NFR BLD: 14 % (ref 12–49)
MCH RBC QN AUTO: 32.2 PG (ref 26–34)
MCHC RBC AUTO-ENTMCNC: 33.8 G/DL (ref 30–36.5)
MCV RBC AUTO: 95.4 FL (ref 80–99)
MONOCYTES # BLD: 0.8 K/UL (ref 0–1)
MONOCYTES NFR BLD: 8 % (ref 5–13)
NEUTS SEG # BLD: 8.2 K/UL (ref 1.8–8)
NEUTS SEG NFR BLD: 78 % (ref 32–75)
NRBC # BLD: 0 K/UL (ref 0–0.01)
NRBC BLD-RTO: 0 PER 100 WBC
PLATELET # BLD AUTO: 244 K/UL (ref 150–400)
PMV BLD AUTO: 9.1 FL (ref 8.9–12.9)
POTASSIUM SERPL-SCNC: 4.6 MMOL/L (ref 3.5–5.1)
PROT SERPL-MCNC: 7.2 G/DL (ref 6.4–8.2)
RBC # BLD AUTO: 4.59 M/UL (ref 3.8–5.2)
SODIUM SERPL-SCNC: 139 MMOL/L (ref 136–145)
SOURCE, COVRS: NORMAL
TROPONIN I SERPL-MCNC: 2.31 NG/ML
WBC # BLD AUTO: 10.5 K/UL (ref 3.6–11)

## 2021-08-04 PROCEDURE — 85025 COMPLETE CBC W/AUTO DIFF WBC: CPT

## 2021-08-04 PROCEDURE — 87635 SARS-COV-2 COVID-19 AMP PRB: CPT

## 2021-08-04 PROCEDURE — 74011250637 HC RX REV CODE- 250/637: Performed by: EMERGENCY MEDICINE

## 2021-08-04 PROCEDURE — 96372 THER/PROPH/DIAG INJ SC/IM: CPT

## 2021-08-04 PROCEDURE — 71045 X-RAY EXAM CHEST 1 VIEW: CPT

## 2021-08-04 PROCEDURE — 83880 ASSAY OF NATRIURETIC PEPTIDE: CPT

## 2021-08-04 PROCEDURE — 74011250636 HC RX REV CODE- 250/636: Performed by: EMERGENCY MEDICINE

## 2021-08-04 PROCEDURE — 84484 ASSAY OF TROPONIN QUANT: CPT

## 2021-08-04 PROCEDURE — 94664 DEMO&/EVAL PT USE INHALER: CPT

## 2021-08-04 PROCEDURE — 65660000000 HC RM CCU STEPDOWN

## 2021-08-04 PROCEDURE — 77010033678 HC OXYGEN DAILY

## 2021-08-04 PROCEDURE — 36415 COLL VENOUS BLD VENIPUNCTURE: CPT

## 2021-08-04 PROCEDURE — 99215 OFFICE O/P EST HI 40 MIN: CPT | Performed by: INTERNAL MEDICINE

## 2021-08-04 PROCEDURE — 80053 COMPREHEN METABOLIC PANEL: CPT

## 2021-08-04 PROCEDURE — 74011250636 HC RX REV CODE- 250/636: Performed by: HOSPITALIST

## 2021-08-04 PROCEDURE — 93000 ELECTROCARDIOGRAM COMPLETE: CPT | Performed by: INTERNAL MEDICINE

## 2021-08-04 PROCEDURE — 94640 AIRWAY INHALATION TREATMENT: CPT

## 2021-08-04 PROCEDURE — 96374 THER/PROPH/DIAG INJ IV PUSH: CPT

## 2021-08-04 PROCEDURE — 94760 N-INVAS EAR/PLS OXIMETRY 1: CPT

## 2021-08-04 PROCEDURE — 93005 ELECTROCARDIOGRAM TRACING: CPT

## 2021-08-04 PROCEDURE — 77030013140 HC MSK NEB VYRM -A

## 2021-08-04 PROCEDURE — 96375 TX/PRO/DX INJ NEW DRUG ADDON: CPT

## 2021-08-04 PROCEDURE — 74011000250 HC RX REV CODE- 250: Performed by: HOSPITALIST

## 2021-08-04 PROCEDURE — 99284 EMERGENCY DEPT VISIT MOD MDM: CPT

## 2021-08-04 RX ORDER — MORPHINE SULFATE 4 MG/ML
4 INJECTION INTRAVENOUS
Status: DISCONTINUED | OUTPATIENT
Start: 2021-08-04 | End: 2021-08-04 | Stop reason: ALTCHOICE

## 2021-08-04 RX ORDER — MAGNESIUM SULFATE 100 %
4 CRYSTALS MISCELLANEOUS AS NEEDED
Status: DISCONTINUED | OUTPATIENT
Start: 2021-08-04 | End: 2021-08-10 | Stop reason: HOSPADM

## 2021-08-04 RX ORDER — ALBUTEROL SULFATE 0.83 MG/ML
2.5 SOLUTION RESPIRATORY (INHALATION)
Status: DISCONTINUED | OUTPATIENT
Start: 2021-08-04 | End: 2021-08-10 | Stop reason: HOSPADM

## 2021-08-04 RX ORDER — MORPHINE SULFATE 4 MG/ML
4 INJECTION INTRAVENOUS ONCE
Status: COMPLETED | OUTPATIENT
Start: 2021-08-04 | End: 2021-08-04

## 2021-08-04 RX ORDER — MORPHINE SULFATE 2 MG/ML
2 INJECTION, SOLUTION INTRAMUSCULAR; INTRAVENOUS
Status: DISCONTINUED | OUTPATIENT
Start: 2021-08-04 | End: 2021-08-10 | Stop reason: HOSPADM

## 2021-08-04 RX ORDER — ONDANSETRON 2 MG/ML
4 INJECTION INTRAMUSCULAR; INTRAVENOUS
Status: COMPLETED | OUTPATIENT
Start: 2021-08-04 | End: 2021-08-04

## 2021-08-04 RX ORDER — INSULIN LISPRO 100 [IU]/ML
INJECTION, SOLUTION INTRAVENOUS; SUBCUTANEOUS EVERY 6 HOURS
Status: DISCONTINUED | OUTPATIENT
Start: 2021-08-05 | End: 2021-08-06

## 2021-08-04 RX ORDER — LORAZEPAM 2 MG/ML
1 INJECTION INTRAMUSCULAR
Status: DISCONTINUED | OUTPATIENT
Start: 2021-08-04 | End: 2021-08-10 | Stop reason: HOSPADM

## 2021-08-04 RX ORDER — IPRATROPIUM BROMIDE AND ALBUTEROL SULFATE 2.5; .5 MG/3ML; MG/3ML
3 SOLUTION RESPIRATORY (INHALATION)
Status: DISCONTINUED | OUTPATIENT
Start: 2021-08-04 | End: 2021-08-04

## 2021-08-04 RX ORDER — ENOXAPARIN SODIUM 100 MG/ML
1 INJECTION SUBCUTANEOUS EVERY 12 HOURS
Status: DISCONTINUED | OUTPATIENT
Start: 2021-08-05 | End: 2021-08-09

## 2021-08-04 RX ORDER — GUAIFENESIN 100 MG/5ML
81 LIQUID (ML) ORAL DAILY
Status: DISCONTINUED | OUTPATIENT
Start: 2021-08-05 | End: 2021-08-10 | Stop reason: HOSPADM

## 2021-08-04 RX ORDER — ACETAMINOPHEN 325 MG/1
650 TABLET ORAL
Status: DISCONTINUED | OUTPATIENT
Start: 2021-08-04 | End: 2021-08-10 | Stop reason: HOSPADM

## 2021-08-04 RX ORDER — LEVOTHYROXINE SODIUM 125 UG/1
125 TABLET ORAL
COMMUNITY
End: 2021-08-10

## 2021-08-04 RX ORDER — CLOPIDOGREL BISULFATE 75 MG/1
75 TABLET ORAL
Status: COMPLETED | OUTPATIENT
Start: 2021-08-04 | End: 2021-08-04

## 2021-08-04 RX ORDER — CALCIUM CARB/MAGNESIUM CARB 311-232MG
5 TABLET ORAL
Status: DISCONTINUED | OUTPATIENT
Start: 2021-08-04 | End: 2021-08-10 | Stop reason: HOSPADM

## 2021-08-04 RX ORDER — OXYCODONE HYDROCHLORIDE 5 MG/1
5 TABLET ORAL
Status: DISCONTINUED | OUTPATIENT
Start: 2021-08-04 | End: 2021-08-10 | Stop reason: HOSPADM

## 2021-08-04 RX ORDER — ACETAMINOPHEN 325 MG/1
650 TABLET ORAL
COMMUNITY

## 2021-08-04 RX ORDER — FUROSEMIDE 10 MG/ML
40 INJECTION INTRAMUSCULAR; INTRAVENOUS
Status: COMPLETED | OUTPATIENT
Start: 2021-08-04 | End: 2021-08-04

## 2021-08-04 RX ORDER — LEVOTHYROXINE SODIUM 125 UG/1
125 TABLET ORAL
Status: DISCONTINUED | OUTPATIENT
Start: 2021-08-05 | End: 2021-08-10 | Stop reason: HOSPADM

## 2021-08-04 RX ORDER — SODIUM CHLORIDE 0.9 % (FLUSH) 0.9 %
5-40 SYRINGE (ML) INJECTION EVERY 8 HOURS
Status: DISCONTINUED | OUTPATIENT
Start: 2021-08-04 | End: 2021-08-10 | Stop reason: HOSPADM

## 2021-08-04 RX ORDER — BUDESONIDE 0.5 MG/2ML
500 INHALANT ORAL
Status: DISCONTINUED | OUTPATIENT
Start: 2021-08-04 | End: 2021-08-05

## 2021-08-04 RX ORDER — BUDESONIDE 0.5 MG/2ML
500 INHALANT ORAL
Status: DISCONTINUED | OUTPATIENT
Start: 2021-08-04 | End: 2021-08-04

## 2021-08-04 RX ORDER — DEXTROSE 50 % IN WATER (D50W) INTRAVENOUS SYRINGE
25-50 AS NEEDED
Status: DISCONTINUED | OUTPATIENT
Start: 2021-08-04 | End: 2021-08-10 | Stop reason: HOSPADM

## 2021-08-04 RX ORDER — SODIUM CHLORIDE 0.9 % (FLUSH) 0.9 %
5-40 SYRINGE (ML) INJECTION AS NEEDED
Status: DISCONTINUED | OUTPATIENT
Start: 2021-08-04 | End: 2021-08-10 | Stop reason: HOSPADM

## 2021-08-04 RX ORDER — ENOXAPARIN SODIUM 100 MG/ML
60 INJECTION SUBCUTANEOUS ONCE
Status: COMPLETED | OUTPATIENT
Start: 2021-08-04 | End: 2021-08-04

## 2021-08-04 RX ORDER — METOPROLOL SUCCINATE 25 MG/1
25 TABLET, EXTENDED RELEASE ORAL DAILY
COMMUNITY
End: 2021-08-24

## 2021-08-04 RX ORDER — CLOPIDOGREL BISULFATE 75 MG/1
75 TABLET ORAL DAILY
Status: DISCONTINUED | OUTPATIENT
Start: 2021-08-05 | End: 2021-08-10 | Stop reason: HOSPADM

## 2021-08-04 RX ORDER — IPRATROPIUM BROMIDE AND ALBUTEROL SULFATE 2.5; .5 MG/3ML; MG/3ML
3 SOLUTION RESPIRATORY (INHALATION)
Status: DISCONTINUED | OUTPATIENT
Start: 2021-08-04 | End: 2021-08-06

## 2021-08-04 RX ORDER — MONTELUKAST SODIUM 10 MG/1
10 TABLET ORAL EVERY EVENING
COMMUNITY

## 2021-08-04 RX ORDER — ASPIRIN 325 MG
325 TABLET ORAL
Status: COMPLETED | OUTPATIENT
Start: 2021-08-04 | End: 2021-08-04

## 2021-08-04 RX ORDER — ATORVASTATIN CALCIUM 20 MG/1
80 TABLET, FILM COATED ORAL
Status: DISCONTINUED | OUTPATIENT
Start: 2021-08-04 | End: 2021-08-10 | Stop reason: HOSPADM

## 2021-08-04 RX ADMIN — IPRATROPIUM BROMIDE AND ALBUTEROL SULFATE 3 ML: .5; 3 SOLUTION RESPIRATORY (INHALATION) at 20:22

## 2021-08-04 RX ADMIN — ASPIRIN 325 MG: 325 TABLET, FILM COATED ORAL at 18:56

## 2021-08-04 RX ADMIN — BUDESONIDE 500 MCG: 0.5 SUSPENSION RESPIRATORY (INHALATION) at 22:21

## 2021-08-04 RX ADMIN — IPRATROPIUM BROMIDE AND ALBUTEROL 1 PUFF: 20; 100 SPRAY, METERED RESPIRATORY (INHALATION) at 19:50

## 2021-08-04 RX ADMIN — CLOPIDOGREL BISULFATE 75 MG: 75 TABLET ORAL at 18:56

## 2021-08-04 RX ADMIN — MORPHINE SULFATE 4 MG: 4 INJECTION, SOLUTION INTRAMUSCULAR; INTRAVENOUS at 19:48

## 2021-08-04 RX ADMIN — MORPHINE SULFATE 2 MG: 2 INJECTION, SOLUTION INTRAMUSCULAR; INTRAVENOUS at 20:59

## 2021-08-04 RX ADMIN — METHYLPREDNISOLONE SODIUM SUCCINATE 125 MG: 125 INJECTION, POWDER, FOR SOLUTION INTRAMUSCULAR; INTRAVENOUS at 18:56

## 2021-08-04 RX ADMIN — ONDANSETRON 4 MG: 2 INJECTION INTRAMUSCULAR; INTRAVENOUS at 20:59

## 2021-08-04 RX ADMIN — FUROSEMIDE 40 MG: 10 INJECTION, SOLUTION INTRAMUSCULAR; INTRAVENOUS at 18:56

## 2021-08-04 RX ADMIN — ENOXAPARIN SODIUM 60 MG: 60 INJECTION SUBCUTANEOUS at 19:47

## 2021-08-04 NOTE — LETTER
8/4/2021    Patient: Naty Gonzalez   YOB: 1960   Date of Visit: 8/4/2021     BUCKY Cuellar  [address] [address]  [address] [facility] [address]  Via Fax: [phone]    Dear Rocael Rudolph NP,      Thank you for referring Ms. Rebeka Cabral to [facility] for evaluation. My notes for this consultation are attached. If you have questions, please do not hesitate to call me. I look forward to following your patient along with you.       Sincerely,    Godfrey Deluca MD

## 2021-08-04 NOTE — ED PROVIDER NOTES
Is a 52-year-old female with history of COPD, diabetes, hyperlipidemia, hypertension, CVA presents with midsternal chest pain and dyspnea on exertion that began yesterday. Patient reports that she sees cardiology for a PFO that was diagnosed recently when she had a stroke. Has no prior history of coronary artery disease. Was seen by her cardiologist earlier today who thought that her symptoms were being caused by her lungs and sent to the ED for further management. Patient reports she has a history of asthma, COPD does not wear home oxygen. Has never been admitted for her COPD symptoms, has never been intubated or use BiPAP. Patient reports she has been using her nebulizer treatments at home with minimal relief. Reports this does not feel like prior COPD, asthma flares.            Past Medical History:   Diagnosis Date    Chronic obstructive pulmonary disease ([address]     Diabetes ([facility]     Borderline    Hypercholesteremia     Hypertension     Stroke ([facility]     Thyroid disease        Past Surgical History:   Procedure Laterality Date    HX APPENDECTOMY      HX CHOLECYSTECTOMY      HX GYN      hysterectomy    GA CARDIAC SURG PROCEDURE UNLIST      cath         Family History:   Problem Relation Age of Onset    Stroke Neg Hx        Social History     Socioeconomic History    Marital status: SINGLE     Spouse name: Not on file    Number of children: Not on file    Years of education: Not on file    Highest education level: Not on file   Occupational History    Not on file   Tobacco Use    Smoking status: Current Every Day Smoker     Packs/day: 1.00    Smokeless tobacco: Never Used   Substance and Sexual Activity    Alcohol use: Never    Drug use: Never    Sexual activity: Not on file   Other Topics Concern    Not on file   Social History Narrative    Not on file     Social Determinants of Health     Financial Resource Strain:     Difficulty of Paying Living Expenses:    Food Insecurity:     Worried About [facility][1] in the Last Year:    [address] in the Last Year:    Transportation Needs:     Lack of Transportation (Medical):  Lack of Transportation (Non-Medical):    Physical Activity:     Days of Exercise per Week:     Minutes of Exercise per Session:    Stress:     Feeling of Stress :    Social Connections:     Frequency of Communication with Friends and Family:     Frequency of Social Gatherings with Friends and Family:     Attends Anabaptism Services:     Active Member of Clubs or Organizations:     Attends Club or Organization Meetings:     Marital Status:    Intimate Partner Violence:     Fear of Current or Ex-Partner:     Emotionally Abused:     Physically Abused:     Sexually Abused: ALLERGIES: Patient has no known allergies. Review of Systems   Constitutional: Negative for chills and fever. HENT: Negative for drooling and nosebleeds. Eyes: Negative for pain and itching. Respiratory: Positive for shortness of breath. Negative for choking and stridor. Cardiovascular: Positive for chest pain. Negative for leg swelling. Gastrointestinal: Negative for abdominal distention and rectal pain. Endocrine: Negative for heat intolerance and polyphagia. Genitourinary: Negative for enuresis and genital sores. Musculoskeletal: Negative for arthralgias and joint swelling. Skin: Negative for color change. Allergic/Immunologic: Negative for immunocompromised state. Neurological: Negative for tremors and speech difficulty. Hematological: Negative for adenopathy. Psychiatric/Behavioral: Negative for dysphoric mood and sleep disturbance.        Vitals:    08/04/21 1622 08/04/21 1735 08/04/21 1858 08/04/21 1900   BP: 126/89 114/84 (!) 114/90 122/86   Pulse: (!) 120 (!) 109 (!) 111 (!) 109   Resp: 24 19 18 16   Temp: 99 °F (37.2 °C)      SpO2: 94% 93% 94% 92%   Weight: 61.4 kg (135 lb 5.8 oz)      Height: 5' (1.524 m)               Physical Exam  Vitals and nursing note reviewed. Constitutional:       General: She is not in acute distress. Appearance: She is well-developed. She is not toxic-appearing or diaphoretic. HENT:      Head: Normocephalic and atraumatic. Nose: Nose normal.   Eyes:      Conjunctiva/sclera: Conjunctivae normal.   Cardiovascular:      Rate and Rhythm: Regular rhythm. Heart sounds: Normal heart sounds. Pulmonary:      Effort: Pulmonary effort is normal. No respiratory distress. Breath sounds: Examination of the right-upper field reveals wheezing. Examination of the left-upper field reveals wheezing. Examination of the right-middle field reveals wheezing. Examination of the left-middle field reveals wheezing. Examination of the right-lower field reveals wheezing. Examination of the left-lower field reveals wheezing. Wheezing present. Abdominal:      General: There is no distension. Palpations: Abdomen is soft. Musculoskeletal:         General: No deformity. Normal range of motion. Cervical back: Normal range of motion and neck supple. Right lower leg: Edema present. Left lower leg: Edema present. Skin:     General: Skin is warm and dry. Neurological:      Mental Status: She is alert and oriented to person, place, and time. Coordination: Coordination normal.   Psychiatric:         Behavior: Behavior normal.          MDM  Number of Diagnoses or Management Options  Elevated brain natriuretic peptide (BNP) level  NSTEMI (non-ST elevated myocardial infarction) (HCC)  SOB (shortness of breath)  Diagnosis management comments: NSTEMI and discussed with cardiology. Pt with no respiratory distress on exam, but solumedrol and duoneb given for significant wheezing on auscultation. Admitted for further management. Critical care time 65 mins. ED Course as of Aug 04 1931   Wed Aug 04, 2021   4471 ED EKG interpretation:  Rhythm: sinus tachycardia; and regular .  Rate (approx.): 106; Axis: left axis deviation; ST/T wave: non-specific changes; No STEMI        [AL]      ED Course User Index  [AL] Alphonse Velasco MD       Procedures    Perfect Serve Consult for Admission  7:32 PM    ED Room Number: ER07/07  Patient Name and age:  Donn Luis 62 y.o.  female  Working Diagnosis:   1. NSTEMI (non-ST elevated myocardial infarction) (HCC)    2. Elevated brain natriuretic peptide (BNP) level    3. SOB (shortness of breath)        COVID-19 Suspicion:  no  Sepsis present:  no  Reassessment needed: no  Code Status:  Full Code  Readmission: no  Isolation Requirements:  no  Recommended Level of Care:  telemetry  Department:[facility] ED - [phone]  Other:  CP and KNOX that began yesterday. Followed by Jaspreet for PFO repair and PVCs. No hx of CAD. Discussed with Gordo. ASA, plavix, lovenox, lasix given. Patient is being admitted to the hospital.  The results of their tests and reasons for their admission have been discussed with them and/or available family. They convey agreement and understanding for the need to be admitted and for their admission diagnosis.

## 2021-08-04 NOTE — Clinical Note
TRANSFER - OUT REPORT:     Verbal report given to: FUNMI (at bedside). Report consisted of patient's Situation, Background, Assessment and   Recommendations(SBAR). Opportunity for questions and clarification was provided. Patient transported with a Registered Nurse. Patient transported to: recovery.

## 2021-08-04 NOTE — PROGRESS NOTES
[facility] [place]: Jerry Smith  [phone]          Cardiology structural heart disease progress note      Requesting/referring provider: BUCKY Catherine,   Reason for Consult: Stroke with fossa ovalis defect/ASD now status post PFO closure    Assessment/Plan:  1. Atrial level intracardiac shunt with secondary to complex fossa ovalis defect/ASD. No evidence of right atrial or ventricular enlargement/ volume overload. Only manifestation being possible recurrent embolic strokes. Now status post PFO closure with [place] cardio form 30 mm device. 2.  History of stroke with MRI suggestive of possible embolic etiology involving the left frontal and middle lobes  3. Hypertension  4. Diabetes mellitus  5. History of tobacco abuse quit after 30-pack-year smoking when she had a stroke  6. Hyperlipidemia stable and controlled on her intensity statin therapy  7. Acute COPD exacerbation    Mrs. Ria Staples was seen originally for stroke in the setting of a PFO. She underwent PFO closure about 1 year ago with a 30 mm [place] cardio form device. She was supposed to get an echocardiogram today but unfortunately due to technical issues could not get it. She is reporting occasional palpitations which could be short bursts of atrial tachycardia post PFO closure which are expected in the first 3 months. Plavix therapy may be discontinued and aspirin should be continued on him. Long-term statin therapy and aspirin therapy is recommended. Last LDL was 132 and since then statin therapy has been increased. Rest of the cardiology follow-up can be continued with Dr. Richa Garcia. I recommend  echocardiograms every 1 to 2 years for surveillance post PFO closure. Today on her visit she appears to be acute exacerbation of asthma/COPD. She appears to be tachycardic but ECG only shows sinus tachycardia which appears to be a response to her severe asthma.   No hypoxemia is noted but I feel that she needs to be seen in the emergency room, probably get a chest x-ray and may require treatment for acute exacerbation of COPD/asthma potentially with steroid taper and/or antibiotics. I discussed this with the patient and called Dr. Michelle Cao in the emergency room at [facility] for potential management of acute COPD exacerbation in the hospital.    [x]    High complexity decision making was performed  [x]    Patient is at high-risk of decompensation with multiple organ involvement    HPI: Damari Beck, a 62y.o. year-old who presents for evaluation of PFO in the setting of stroke. Ms. Rendell Harada has history of hypertension, diabetes, tobacco abuse. In 2020 she suffered a stroke involving left hemiparesis, numbness on the face and speech disturbance in 2020. She subsequently has improved from her stroke. As a part of her stroke work-up she underwent evaluation for extra carotid disease. 3 days ago she reported an incidence of transient hand and face numbness which lasted for less than a minute. Otherwise she does not report any recurrent symptoms. She also has prior history of chest pain and hypertension for which she has seen Dr. Alise Calvillo. Atrial fibrillation was ruled out on 30-day event monitor. Eventually she underwent PFO closure with a 30 mm [place] cardio form device. Postoperatively she has done very well. She occasionally has atypical mild chest discomfort extending to her epigastrium which is unrelated to activity and does not radiate. This is something that she has noted in the past even before PFO closure. She also reports occasional short bursts of palpitation which last few seconds to a minute. She does not report any associated dizziness or lightheadedness with these. She denies any symptoms consistent with congestive heart failure at this time. Other cardiovascular issues include hypertension, hyperlipidemia and diabetes mellitus all of which are stable. Investigations personally reviewed by me: While she has moderate bilateral carotid stenosis no critical lesion was identified. Her LV systolic function is normal.  30-day event monitor demonstrated no atrial fibrillation burden. TT Echocardiogram demonstrated intracardiac atrial level shunt likely from a PFO. Atrial septal aneurysm was also noted. KRIS showed fossa ovalis defect/small ASD rather than a PFO. Anatomically appears suitable for closure. 30-day event monitor did not demonstrate any significant atrial fibrillation burden. EKG August 4, 2021: Sinus tachycardia, nonspecific ST-T changes probably related to sinus tach cardia    She  has a past medical history of Chronic obstructive pulmonary disease ([facility] Diabetes ([facility] Hypercholesteremia, Hypertension, Stroke [facility]), and Thyroid disease. Review of system:Patient reports no dyspnea/PND/Orthpnea/CP. She reports no cough/fever/abdominal pain. All other systems negative except as above. Family History   Problem Relation Age of Onset    Stroke Neg Hx       Social History     Socioeconomic History    Marital status: SINGLE     Spouse name: Not on file    Number of children: Not on file    Years of education: Not on file    Highest education level: Not on file   Tobacco Use    Smoking status: Current Every Day Smoker     Packs/day: 1.00    Smokeless tobacco: Never Used   Substance and Sexual Activity    Alcohol use: Never    Drug use: Never     Social Determinants of Health     Financial Resource Strain:     Difficulty of Paying Living Expenses:    Food Insecurity:     Worried About Running Out of Food in the Last Year:     Ran Out of Food in the Last Year:    Transportation Needs:     Lack of Transportation (Medical):      Lack of Transportation (Non-Medical):    Physical Activity:     Days of Exercise per Week:     Minutes of Exercise per Session:    Stress:     Feeling of Stress :    Social Connections:     Frequency of Communication with Friends and Family:     Frequency of Social Gatherings with Friends and Family:     Attends Yazidi Services:     Active Member of Clubs or Organizations:     Attends Club or Organization Meetings:     Marital Status:       PE  Vitals:    08/04/21 1442   BP: 110/80   Pulse: (!) 123   Resp: 16   Weight: 134 lb (60.8 kg)    Body mass index is 26.17 kg/m². PHYSICAL EXAMINATION      /80 (BP 1 Location: Left upper arm, BP Patient Position: Sitting)   Pulse (!) 123   Resp 16   Wt 134 lb (60.8 kg)   BMI 26.17 kg/m²   General:    Alert, cooperative, no distress. Psychiatric:    Normal Mood and affect    Eye/ENT:      Pupils equal, No asymmetry, Conjunctival pink. Able to hear voice at normal amplitude   Lungs:      Visibly symmetric chest expansion, No palpable tenderness. Severe bilateral wheezing with reduced breath sounds bilaterally. Prolonged expiratory phase. Heart[de-identified]    Regular rate and rhythm, S1, S2 normal, no murmur, click, rub or gallop. No JVD, Normal palpable peripheral pulses. No cyanosis   Abdomen:     Soft, non-tender. Bowel sounds normal. No masses,  No      organomegaly. Extremities:   Extremities normal, atraumatic, no edema. Neurologic:   CN II-XII grossly intact.  No gross focal deficits             Recent Labs:  Lab Results   Component Value Date/Time    Cholesterol, total 186 04/05/2020 03:03 AM    HDL Cholesterol 43 04/05/2020 03:03 AM    LDL, calculated 117.8 (H) 04/05/2020 03:03 AM    Triglyceride 126 04/05/2020 03:03 AM    CHOL/HDL Ratio 4.3 04/05/2020 03:03 AM     Lab Results   Component Value Date/Time    Creatinine 0.59 06/24/2021 11:58 AM     Lab Results   Component Value Date/Time    BUN 11 06/24/2021 11:58 AM     Lab Results   Component Value Date/Time    Potassium 4.1 06/24/2021 11:58 AM     Lab Results   Component Value Date/Time    Hemoglobin A1c 5.6 04/03/2020 12:04 PM     Lab Results   Component Value Date/Time    HGB 14.5 06/24/2021 11:58 AM     Lab Results   Component Value Date/Time    PLATELET 339 41/38/3486 11:58 AM       Reviewed:  Past Medical History:   Diagnosis Date    Chronic [facility] disease ([facility]     Diabetes ([facility]     Borderline    Hypercholesteremia     Hypertension     Stroke ([facility]     Thyroid disease      Social History     Tobacco Use   Smoking Status Current Every Day Smoker    Packs/day: 1.00   Smokeless Tobacco Never Used     Social History     Substance and Sexual Activity   Alcohol Use Never     No Known Allergies  Family History   Problem Relation Age of Onset    Stroke Neg Hx         Current Outpatient Medications   Medication Sig    levothyroxine (SYNTHROID) 150 mcg tablet Take 125 mcg by mouth Daily (before breakfast).  clopidogreL (Plavix) 75 mg tab Take  by mouth.  traMADoL (ULTRAM) 50 mg tablet TAKE 1 TABLET BY MOUTH EVERY 6 HOURS AS NEEDED FOR 7 DAYS    albuterol-ipratropium (DUO-NEB) 2.5 mg-0.5 mg/3 ml nebu USE 1 AMPULE IN NEBULIZER EVERY 6 HOURS AS NEEDED FOR 30 DAYS    atorvastatin (LIPITOR) 80 mg tablet Take 1 Tab by mouth nightly.  ARIPiprazole (Abilify) 10 mg tablet 1 tablet    aspirin 81 mg chewable tablet Take 81 mg by mouth daily.  PARoxetine (PaxiL) 20 mg tablet Take 20 mg by mouth daily.  budesonide-formoteroL (Symbicort) 160-4.5 mcg/actuation HFAA Take 2 Puffs by inhalation two (2) times a day.  risedronate (ActoneL) 35 mg tablet Take 35 mg by mouth every seven (7) days.  albuterol (PROVENTIL HFA, VENTOLIN HFA, PROAIR HFA) 90 mcg/actuation inhaler Take 2 Puffs by inhalation every six (6) hours as needed for Wheezing.  Chantix 0.5 mg tablet TAKE 1 TABLET BY MOUTH ON DAYS 1 3 1 TABLET TWICE DAILY ON DAYS 4 7 THEN 2 TABLETS TWICE DAILY FOR 30 DAYS (Patient not taking: Reported on 8/4/2021)     No current facility-administered medications for this visit.      Facility-Administered Medications Ordered in Other Visits   Medication Dose Route Frequency    sodium chloride (NS) flush 10 mL  10 mL IntraVENous PRN       Meche aPredes MD08/04/21     ATTENTION:   This medical record was transcribed using an electronic medical records/speech recognition system. Although proofread, it may and can contain electronic, spelling and other errors. Corrections may be executed at a later time. Please feel free to contact us for any clarifications as needed.     [facility] heart and [facility] [place]  [address], [address], [address] [place] [address] Avenue

## 2021-08-04 NOTE — ED TRIAGE NOTES
Pt referred by cardiologist Dr. Hanna Willoughby. Pt states \"the doctor said my lungs are constricted\". 93 % on RA.  Labored breathing and SOB x 2 days, tachycardic in triage

## 2021-08-04 NOTE — ED NOTES
Bedside and Verbal shift change report given to Ester Gibbons RN and Litzy Camarena RN (oncoming nurse) by Katherine Schilling RN (offgoing nurse). Report included the following information SBAR, Kardex, ED Summary, [facility] and Recent Results.

## 2021-08-05 ENCOUNTER — APPOINTMENT (OUTPATIENT)
Dept: NON INVASIVE DIAGNOSTICS | Age: 61
DRG: 190 | End: 2021-08-05
Attending: HOSPITALIST
Payer: MEDICAID

## 2021-08-05 LAB
ANION GAP SERPL CALC-SCNC: 3 MMOL/L (ref 5–15)
ATRIAL RATE: 102 BPM
ATRIAL RATE: 106 BPM
BUN SERPL-MCNC: 26 MG/DL (ref 6–20)
BUN/CREAT SERPL: 25 (ref 12–20)
CALCIUM SERPL-MCNC: 8.5 MG/DL (ref 8.5–10.1)
CALCULATED P AXIS, ECG09: 62 DEGREES
CALCULATED P AXIS, ECG09: 70 DEGREES
CALCULATED R AXIS, ECG10: -45 DEGREES
CALCULATED R AXIS, ECG10: 33 DEGREES
CALCULATED T AXIS, ECG11: 75 DEGREES
CALCULATED T AXIS, ECG11: 87 DEGREES
CHLORIDE SERPL-SCNC: 107 MMOL/L (ref 97–108)
CHOLEST SERPL-MCNC: 188 MG/DL
CO2 SERPL-SCNC: 26 MMOL/L (ref 21–32)
CREAT SERPL-MCNC: 1.06 MG/DL (ref 0.55–1.02)
DIAGNOSIS, 93000: NORMAL
DIAGNOSIS, 93000: NORMAL
ECHO AO ASC DIAM: 3.16 CM
ECHO AO ROOT DIAM: 2.87 CM
ECHO AV AREA PEAK VELOCITY: 2.19 CM2
ECHO AV AREA/BSA PEAK VELOCITY: 1.4 CM2/M2
ECHO AV PEAK GRADIENT: 11.77 MMHG
ECHO AV PEAK VELOCITY: 171.56 CM/S
ECHO IVC PROX: 1.79 CM
ECHO LA AREA 4C: 10.2 CM2
ECHO LA MAJOR AXIS: 3.09 CM
ECHO LA MINOR AXIS: 1.97 CM
ECHO LA VOL 2C: 20.88 ML (ref 22–52)
ECHO LA VOL 4C: 17.1 ML (ref 22–52)
ECHO LA VOL BP: 25.22 ML (ref 22–52)
ECHO LA VOL/BSA BIPLANE: 16.06 ML/M2 (ref 16–28)
ECHO LA VOLUME INDEX A2C: 13.3 ML/M2 (ref 16–28)
ECHO LA VOLUME INDEX A4C: 10.89 ML/M2 (ref 16–28)
ECHO LV E' LATERAL VELOCITY: 7.81 CENTIMETER/SECOND
ECHO LV E' SEPTAL VELOCITY: 6.6 CENTIMETER/SECOND
ECHO LV EDV A2C: 83.6 ML
ECHO LV EDV A4C: 55.79 ML
ECHO LV EDV BP: 70.21 ML (ref 56–104)
ECHO LV EDV INDEX A4C: 35.5 ML/M2
ECHO LV EDV INDEX BP: 44.7 ML/M2
ECHO LV EDV NDEX A2C: 53.2 ML/M2
ECHO LV EJECTION FRACTION A2C: 41 PERCENT
ECHO LV EJECTION FRACTION A4C: 21 PERCENT
ECHO LV EJECTION FRACTION BIPLANE: 32.4 PERCENT (ref 55–100)
ECHO LV ESV A2C: 49.1 ML
ECHO LV ESV A4C: 44.09 ML
ECHO LV ESV BP: 47.49 ML (ref 19–49)
ECHO LV ESV INDEX A2C: 31.3 ML/M2
ECHO LV ESV INDEX A4C: 28.1 ML/M2
ECHO LV ESV INDEX BP: 30.2 ML/M2
ECHO LV INTERNAL DIMENSION DIASTOLIC: 4.52 CM (ref 3.9–5.3)
ECHO LV INTERNAL DIMENSION SYSTOLIC: 3.41 CM
ECHO LV IVSD: 0.63 CM (ref 0.6–0.9)
ECHO LV MASS 2D: 96.4 G (ref 67–162)
ECHO LV MASS INDEX 2D: 61.4 G/M2 (ref 43–95)
ECHO LV POSTERIOR WALL DIASTOLIC: 0.77 CM (ref 0.6–0.9)
ECHO LVOT DIAM: 2.18 CM
ECHO LVOT PEAK GRADIENT: 4.07 MMHG
ECHO LVOT PEAK VELOCITY: 100.88 CM/S
ECHO MV A VELOCITY: 105.55 CENTIMETER/SECOND
ECHO MV AREA PHT: 4.33 CM2
ECHO MV E DECELERATION TIME (DT): 175.18 MS
ECHO MV E VELOCITY: 76.46 CENTIMETER/SECOND
ECHO MV PRESSURE HALF TIME (PHT): 50.8 MS
ECHO PV MAX VELOCITY: 101.57 CM/S
ECHO PV PEAK INSTANTANEOUS GRADIENT SYSTOLIC: 4.13 MMHG
ECHO RV INTERNAL DIMENSION: 2.84 CM
ECHO RV TAPSE: 1.51 CM (ref 1.5–2)
ECHO TV REGURGITANT MAX VELOCITY: 180.34 CM/S
ECHO TV REGURGITANT PEAK GRADIENT: 13.01 MMHG
EST. AVERAGE GLUCOSE BLD GHB EST-MCNC: 126 MG/DL
GLUCOSE BLD STRIP.AUTO-MCNC: 113 MG/DL (ref 65–117)
GLUCOSE BLD STRIP.AUTO-MCNC: 153 MG/DL (ref 65–117)
GLUCOSE BLD STRIP.AUTO-MCNC: 163 MG/DL (ref 65–117)
GLUCOSE BLD STRIP.AUTO-MCNC: 200 MG/DL (ref 65–117)
GLUCOSE SERPL-MCNC: 146 MG/DL (ref 65–100)
HBA1C MFR BLD: 6 % (ref 4–5.6)
HDLC SERPL-MCNC: 47 MG/DL
HDLC SERPL: 4 {RATIO} (ref 0–5)
LA VOL DISK BP: 21.42 ML (ref 22–52)
LDLC SERPL CALC-MCNC: 106.6 MG/DL (ref 0–100)
P-R INTERVAL, ECG05: 136 MS
P-R INTERVAL, ECG05: 136 MS
POTASSIUM SERPL-SCNC: 5.2 MMOL/L (ref 3.5–5.1)
Q-T INTERVAL, ECG07: 322 MS
Q-T INTERVAL, ECG07: 342 MS
QRS DURATION, ECG06: 70 MS
QRS DURATION, ECG06: 78 MS
QTC CALCULATION (BEZET), ECG08: 419 MS
QTC CALCULATION (BEZET), ECG08: 454 MS
SERVICE CMNT-IMP: ABNORMAL
SERVICE CMNT-IMP: NORMAL
SODIUM SERPL-SCNC: 136 MMOL/L (ref 136–145)
T4 FREE SERPL-MCNC: 1.5 NG/DL (ref 0.8–1.5)
TRIGL SERPL-MCNC: 172 MG/DL (ref ?–150)
TROPONIN I SERPL-MCNC: 1.52 NG/ML
TROPONIN I SERPL-MCNC: 2.13 NG/ML
TSH SERPL DL<=0.05 MIU/L-ACNC: <0.01 UIU/ML (ref 0.36–3.74)
VENTRICULAR RATE, ECG03: 102 BPM
VENTRICULAR RATE, ECG03: 106 BPM
VLDLC SERPL CALC-MCNC: 34.4 MG/DL

## 2021-08-05 PROCEDURE — 74011250636 HC RX REV CODE- 250/636

## 2021-08-05 PROCEDURE — 80061 LIPID PANEL: CPT

## 2021-08-05 PROCEDURE — 83036 HEMOGLOBIN GLYCOSYLATED A1C: CPT

## 2021-08-05 PROCEDURE — C1769 GUIDE WIRE: HCPCS | Performed by: STUDENT IN AN ORGANIZED HEALTH CARE EDUCATION/TRAINING PROGRAM

## 2021-08-05 PROCEDURE — 74011000636 HC RX REV CODE- 636: Performed by: STUDENT IN AN ORGANIZED HEALTH CARE EDUCATION/TRAINING PROGRAM

## 2021-08-05 PROCEDURE — 77030008543 HC TBNG MON PRSS MRTM -A: Performed by: STUDENT IN AN ORGANIZED HEALTH CARE EDUCATION/TRAINING PROGRAM

## 2021-08-05 PROCEDURE — 82962 GLUCOSE BLOOD TEST: CPT

## 2021-08-05 PROCEDURE — 36415 COLL VENOUS BLD VENIPUNCTURE: CPT

## 2021-08-05 PROCEDURE — 94760 N-INVAS EAR/PLS OXIMETRY 1: CPT

## 2021-08-05 PROCEDURE — 74011250636 HC RX REV CODE- 250/636: Performed by: STUDENT IN AN ORGANIZED HEALTH CARE EDUCATION/TRAINING PROGRAM

## 2021-08-05 PROCEDURE — C1887 CATHETER, GUIDING: HCPCS | Performed by: STUDENT IN AN ORGANIZED HEALTH CARE EDUCATION/TRAINING PROGRAM

## 2021-08-05 PROCEDURE — 84439 ASSAY OF FREE THYROXINE: CPT

## 2021-08-05 PROCEDURE — 93005 ELECTROCARDIOGRAM TRACING: CPT

## 2021-08-05 PROCEDURE — 77030019569 HC BND COMPR RAD TERU -B: Performed by: STUDENT IN AN ORGANIZED HEALTH CARE EDUCATION/TRAINING PROGRAM

## 2021-08-05 PROCEDURE — 74011250636 HC RX REV CODE- 250/636: Performed by: INTERNAL MEDICINE

## 2021-08-05 PROCEDURE — C1894 INTRO/SHEATH, NON-LASER: HCPCS | Performed by: STUDENT IN AN ORGANIZED HEALTH CARE EDUCATION/TRAINING PROGRAM

## 2021-08-05 PROCEDURE — 99152 MOD SED SAME PHYS/QHP 5/>YRS: CPT | Performed by: STUDENT IN AN ORGANIZED HEALTH CARE EDUCATION/TRAINING PROGRAM

## 2021-08-05 PROCEDURE — 77010033678 HC OXYGEN DAILY

## 2021-08-05 PROCEDURE — 74011000250 HC RX REV CODE- 250: Performed by: STUDENT IN AN ORGANIZED HEALTH CARE EDUCATION/TRAINING PROGRAM

## 2021-08-05 PROCEDURE — 93458 L HRT ARTERY/VENTRICLE ANGIO: CPT | Performed by: STUDENT IN AN ORGANIZED HEALTH CARE EDUCATION/TRAINING PROGRAM

## 2021-08-05 PROCEDURE — B2111ZZ FLUOROSCOPY OF MULTIPLE CORONARY ARTERIES USING LOW OSMOLAR CONTRAST: ICD-10-PCS | Performed by: STUDENT IN AN ORGANIZED HEALTH CARE EDUCATION/TRAINING PROGRAM

## 2021-08-05 PROCEDURE — 74011000250 HC RX REV CODE- 250: Performed by: INTERNAL MEDICINE

## 2021-08-05 PROCEDURE — 94640 AIRWAY INHALATION TREATMENT: CPT

## 2021-08-05 PROCEDURE — 74011250637 HC RX REV CODE- 250/637: Performed by: HOSPITALIST

## 2021-08-05 PROCEDURE — 65660000000 HC RM CCU STEPDOWN

## 2021-08-05 PROCEDURE — 99153 MOD SED SAME PHYS/QHP EA: CPT | Performed by: STUDENT IN AN ORGANIZED HEALTH CARE EDUCATION/TRAINING PROGRAM

## 2021-08-05 PROCEDURE — 93306 TTE W/DOPPLER COMPLETE: CPT | Performed by: SPECIALIST

## 2021-08-05 PROCEDURE — 74011000250 HC RX REV CODE- 250: Performed by: HOSPITALIST

## 2021-08-05 PROCEDURE — 80048 BASIC METABOLIC PNL TOTAL CA: CPT

## 2021-08-05 PROCEDURE — 74011636637 HC RX REV CODE- 636/637: Performed by: HOSPITALIST

## 2021-08-05 PROCEDURE — 84484 ASSAY OF TROPONIN QUANT: CPT

## 2021-08-05 PROCEDURE — 4A023N7 MEASUREMENT OF CARDIAC SAMPLING AND PRESSURE, LEFT HEART, PERCUTANEOUS APPROACH: ICD-10-PCS | Performed by: STUDENT IN AN ORGANIZED HEALTH CARE EDUCATION/TRAINING PROGRAM

## 2021-08-05 PROCEDURE — 74011250637 HC RX REV CODE- 250/637: Performed by: INTERNAL MEDICINE

## 2021-08-05 PROCEDURE — 74011250636 HC RX REV CODE- 250/636: Performed by: HOSPITALIST

## 2021-08-05 PROCEDURE — 84443 ASSAY THYROID STIM HORMONE: CPT

## 2021-08-05 PROCEDURE — 93306 TTE W/DOPPLER COMPLETE: CPT

## 2021-08-05 PROCEDURE — 99223 1ST HOSP IP/OBS HIGH 75: CPT | Performed by: SPECIALIST

## 2021-08-05 PROCEDURE — 2709999900 HC NON-CHARGEABLE SUPPLY: Performed by: STUDENT IN AN ORGANIZED HEALTH CARE EDUCATION/TRAINING PROGRAM

## 2021-08-05 RX ORDER — DOXYCYCLINE HYCLATE 100 MG
100 TABLET ORAL EVERY 12 HOURS
Status: COMPLETED | OUTPATIENT
Start: 2021-08-05 | End: 2021-08-09

## 2021-08-05 RX ORDER — SODIUM CHLORIDE 0.9 % (FLUSH) 0.9 %
5-40 SYRINGE (ML) INJECTION AS NEEDED
Status: DISCONTINUED | OUTPATIENT
Start: 2021-08-05 | End: 2021-08-10 | Stop reason: HOSPADM

## 2021-08-05 RX ORDER — FENTANYL CITRATE 50 UG/ML
INJECTION, SOLUTION INTRAMUSCULAR; INTRAVENOUS AS NEEDED
Status: DISCONTINUED | OUTPATIENT
Start: 2021-08-05 | End: 2021-08-05 | Stop reason: HOSPADM

## 2021-08-05 RX ORDER — NALOXONE HYDROCHLORIDE 0.4 MG/ML
0.4 INJECTION, SOLUTION INTRAMUSCULAR; INTRAVENOUS; SUBCUTANEOUS
Status: DISCONTINUED | OUTPATIENT
Start: 2021-08-05 | End: 2021-08-10 | Stop reason: HOSPADM

## 2021-08-05 RX ORDER — LIDOCAINE HYDROCHLORIDE 10 MG/ML
INJECTION INFILTRATION; PERINEURAL AS NEEDED
Status: DISCONTINUED | OUTPATIENT
Start: 2021-08-05 | End: 2021-08-05 | Stop reason: HOSPADM

## 2021-08-05 RX ORDER — MIDAZOLAM HYDROCHLORIDE 1 MG/ML
INJECTION, SOLUTION INTRAMUSCULAR; INTRAVENOUS AS NEEDED
Status: DISCONTINUED | OUTPATIENT
Start: 2021-08-05 | End: 2021-08-05 | Stop reason: HOSPADM

## 2021-08-05 RX ORDER — BUDESONIDE 0.5 MG/2ML
1000 INHALANT ORAL
Status: DISCONTINUED | OUTPATIENT
Start: 2021-08-05 | End: 2021-08-10 | Stop reason: HOSPADM

## 2021-08-05 RX ORDER — SODIUM CHLORIDE 0.9 % (FLUSH) 0.9 %
5-40 SYRINGE (ML) INJECTION EVERY 8 HOURS
Status: DISCONTINUED | OUTPATIENT
Start: 2021-08-05 | End: 2021-08-10 | Stop reason: HOSPADM

## 2021-08-05 RX ORDER — PAROXETINE HYDROCHLORIDE 20 MG/1
20 TABLET, FILM COATED ORAL DAILY
Status: DISCONTINUED | OUTPATIENT
Start: 2021-08-05 | End: 2021-08-10 | Stop reason: HOSPADM

## 2021-08-05 RX ORDER — VERAPAMIL HYDROCHLORIDE 2.5 MG/ML
INJECTION, SOLUTION INTRAVENOUS AS NEEDED
Status: DISCONTINUED | OUTPATIENT
Start: 2021-08-05 | End: 2021-08-05 | Stop reason: HOSPADM

## 2021-08-05 RX ORDER — HEPARIN SODIUM 200 [USP'U]/100ML
INJECTION, SOLUTION INTRAVENOUS
Status: COMPLETED | OUTPATIENT
Start: 2021-08-05 | End: 2021-08-05

## 2021-08-05 RX ORDER — MONTELUKAST SODIUM 10 MG/1
10 TABLET ORAL EVERY EVENING
Status: DISCONTINUED | OUTPATIENT
Start: 2021-08-05 | End: 2021-08-10 | Stop reason: HOSPADM

## 2021-08-05 RX ORDER — GUAIFENESIN 600 MG/1
600 TABLET, EXTENDED RELEASE ORAL EVERY 12 HOURS
Status: DISCONTINUED | OUTPATIENT
Start: 2021-08-05 | End: 2021-08-10 | Stop reason: HOSPADM

## 2021-08-05 RX ORDER — HEPARIN SODIUM 1000 [USP'U]/ML
INJECTION, SOLUTION INTRAVENOUS; SUBCUTANEOUS AS NEEDED
Status: DISCONTINUED | OUTPATIENT
Start: 2021-08-05 | End: 2021-08-05 | Stop reason: HOSPADM

## 2021-08-05 RX ADMIN — ENOXAPARIN SODIUM 60 MG: 60 INJECTION SUBCUTANEOUS at 20:29

## 2021-08-05 RX ADMIN — SODIUM CHLORIDE 500 ML: 9 INJECTION, SOLUTION INTRAVENOUS at 21:34

## 2021-08-05 RX ADMIN — METHYLPREDNISOLONE SODIUM SUCCINATE 60 MG: 125 INJECTION, POWDER, FOR SOLUTION INTRAMUSCULAR; INTRAVENOUS at 21:35

## 2021-08-05 RX ADMIN — LEVOTHYROXINE SODIUM 125 MCG: 0.12 TABLET ORAL at 07:58

## 2021-08-05 RX ADMIN — INSULIN LISPRO 2 UNITS: 100 INJECTION, SOLUTION INTRAVENOUS; SUBCUTANEOUS at 17:34

## 2021-08-05 RX ADMIN — ASPIRIN 81 MG: 81 TABLET, CHEWABLE ORAL at 09:35

## 2021-08-05 RX ADMIN — PAROXETINE HYDROCHLORIDE 20 MG: 20 TABLET, FILM COATED ORAL at 09:35

## 2021-08-05 RX ADMIN — Medication 10 ML: at 14:33

## 2021-08-05 RX ADMIN — METHYLPREDNISOLONE SODIUM SUCCINATE 60 MG: 125 INJECTION, POWDER, FOR SOLUTION INTRAMUSCULAR; INTRAVENOUS at 14:29

## 2021-08-05 RX ADMIN — GUAIFENESIN 600 MG: 600 TABLET ORAL at 12:26

## 2021-08-05 RX ADMIN — IPRATROPIUM BROMIDE AND ALBUTEROL SULFATE 3 ML: .5; 3 SOLUTION RESPIRATORY (INHALATION) at 14:20

## 2021-08-05 RX ADMIN — BUDESONIDE 1000 MCG: 0.5 SUSPENSION RESPIRATORY (INHALATION) at 20:55

## 2021-08-05 RX ADMIN — DOXYCYCLINE HYCLATE 100 MG: 100 TABLET, COATED ORAL at 20:32

## 2021-08-05 RX ADMIN — ENOXAPARIN SODIUM 60 MG: 60 INJECTION SUBCUTANEOUS at 09:35

## 2021-08-05 RX ADMIN — ATORVASTATIN CALCIUM 80 MG: 20 TABLET, FILM COATED ORAL at 21:34

## 2021-08-05 RX ADMIN — BUDESONIDE 500 MCG: 0.5 SUSPENSION RESPIRATORY (INHALATION) at 08:37

## 2021-08-05 RX ADMIN — Medication 10 ML: at 21:35

## 2021-08-05 RX ADMIN — Medication 10 ML: at 06:00

## 2021-08-05 RX ADMIN — GUAIFENESIN 600 MG: 600 TABLET ORAL at 20:32

## 2021-08-05 RX ADMIN — CLOPIDOGREL BISULFATE 75 MG: 75 TABLET, FILM COATED ORAL at 09:35

## 2021-08-05 RX ADMIN — DOXYCYCLINE HYCLATE 100 MG: 100 TABLET, COATED ORAL at 12:26

## 2021-08-05 RX ADMIN — IPRATROPIUM BROMIDE AND ALBUTEROL SULFATE 3 ML: .5; 3 SOLUTION RESPIRATORY (INHALATION) at 08:44

## 2021-08-05 RX ADMIN — MONTELUKAST 10 MG: 10 TABLET, FILM COATED ORAL at 17:01

## 2021-08-05 RX ADMIN — IPRATROPIUM BROMIDE AND ALBUTEROL SULFATE 3 ML: .5; 3 SOLUTION RESPIRATORY (INHALATION) at 20:55

## 2021-08-05 RX ADMIN — Medication 10 ML: at 14:28

## 2021-08-05 NOTE — H&P
[address], [facility] 19  [phone]     Hospitalist Admission Note      NAME: Donn Luis   :  1960   MRN:  [id number]     Date/Time:  2021 8:21 PM    Patient PCP: Carlos Marinelli NP    Emergency Contact:    Extended Emergency Contact Information  Primary Emergency Contact: [address], [id number] [place] [address] Phone: [phone]  Mobile Phone: [phone]  Relation: Son  Secondary Emergency Contact: Anuj Galindo, [address] Phone: [phone]  Mobile Phone: [phone]  Relation: Grandchild      Code: Full Code     Isolation Precautions: There are currently no Active Isolations        Subjective:     CHIEF COMPLAINT: Chest pain     HISTORY OF PRESENT ILLNESS:     Ms. Oralia Thompson is a 62 y.o. female with history that includes HTN, COPD, CVA, CAD with MI 4-5 years ago, and PFO closure 1 year ago was sent from cardiologist office due to chest pain. Reports a sudden onset of intermittent moderate to severe chest pain in the retrosternal area that started yesterday. The pain radiates to the left arm. Describes it as tightness and associated with nausea and shortness of breath. In ER she was found to have an elevated troponin of 2.31 and an elevated BNP of over 7000. She continued to have intermittent chest pain but there was a reproducible component with palpation/pressure onto the sternum and with deep breathing, in addition noted to be wheezing on auscultation. During my visit she became nauseated but did not vomit. Brandin Jean was at bedside. No Known Allergies    Prior to Admission medications    Medication Sig Start Date End Date Taking? Authorizing Provider   levothyroxine (SYNTHROID) 150 mcg tablet Take 125 mcg by mouth Daily (before breakfast). Provider, Historical   clopidogreL (Plavix) 75 mg tab Take  by mouth.     Provider, Historical   traMADoL (ULTRAM) 50 mg tablet TAKE 1 TABLET BY MOUTH EVERY 6 HOURS AS NEEDED FOR 7 DAYS 20 Provider, Historical   albuterol-ipratropium (DUO-NEB) 2.5 mg-0.5 mg/3 ml nebu USE 1 AMPULE IN NEBULIZER EVERY 6 HOURS AS NEEDED FOR 30 DAYS 7/9/20   Provider, Historical   Chantix 0.5 mg tablet TAKE 1 TABLET BY MOUTH ON DAYS 1 3 1 TABLET TWICE DAILY ON DAYS 4 7 THEN 2 TABLETS TWICE DAILY FOR 30 DAYS  Patient not taking: Reported on 8/4/2021 7/9/20   Provider, Historical   atorvastatin (LIPITOR) 80 mg tablet Take 1 Tab by mouth nightly. 6/26/20   Ovidio Sinclair MD   ARIPiprazole (Abilify) 10 mg tablet 1 tablet 5/11/20   Provider, Historical   aspirin 81 mg chewable tablet Take 81 mg by mouth daily. Provider, Historical   PARoxetine (PaxiL) 20 mg tablet Take 20 mg by mouth daily. Provider, Historical   budesonide-formoteroL (Symbicort) 160-4.5 mcg/actuation HFAA Take 2 Puffs by inhalation two (2) times a day. Provider, Historical   risedronate (ActoneL) 35 mg tablet Take 35 mg by mouth every seven (7) days. Provider, Historical   albuterol (PROVENTIL HFA, VENTOLIN HFA, PROAIR HFA) 90 mcg/actuation inhaler Take 2 Puffs by inhalation every six (6) hours as needed for Wheezing.     Provider, Historical       Past Medical History:   Diagnosis Date    [facility] obstructive [facility] disease ([facility]     Diabetes ([facility]     Borderline    Hypercholesteremia     Hypertension     Stroke ([facility]     Thyroid disease         Past Surgical History:   Procedure Laterality Date    HX APPENDECTOMY      HX CHOLECYSTECTOMY      HX GYN      hysterectomy    IN CARDIAC SURG PROCEDURE UNLIST      cath       Social History     Tobacco Use    Smoking status: Current Every Day Smoker     Packs/day: 1.00    Smokeless tobacco: Never Used   Substance Use Topics    Alcohol use: Never        Family History   Problem Relation Age of Onset    Stroke Neg Hx          Review of Systems (14 point ROS):    Gen:   fatigue, generalized weakness, denies chills and denies fevers  Eyes:  negative  ENT:    negative  Resp:  cough, shortness of breath, wheezing and denies sputum  CVS:   edema, dyspnea and KNOX  GI:       nausea, denies vomiting and denies abdominal pain  :     negative  Skin:   negative  Hem:   negative  MS:     Chest wall pain  Marlon:    negative   Psy:    negative  Endo:  negative  ALL:   negative         Objective:      Visit Vitals  /71   Pulse (!) 115   Temp 99 °F (37.2 °C)   Resp 23   Ht 5' (1.524 m)   Wt 61.4 kg (135 lb 5.8 oz)   SpO2 93%   BMI 26.44 kg/m²       Exam:     Physical Exam:    General: alert, well appearing and in mild to moderate painful distress  Head: Normocephalic, without obvious abnormality, atraumatic  Eyes: PERRL, EOMI, anicteric sclerae and conjuntiva clear  ENT: Lips, mucosa, and tongue normal.   Neck: normal, supple and no tenderness  Lungs: normal respiratory effort and Bilateral rhonchi wheezing  Heart: S1, S2 normal, regular rate and regular rhythm  Abd: not distended, soft, nontender, BS present and normactive  Ext: no cyanosis and no edema the patient reports edema but not noted on my exam  Pulses: present 1+ and symmetric  Skin: normal skin color, no rashes and texture normal  Neuro:  alert, oriented x 3, no defects noted in general exam.  Psych: not anxious, cooperative, appropriate affect    Labs:    Recent Labs     08/04/21  1712   WBC 10.5   HGB 14.8   HCT 43.8        Recent Labs     08/04/21  1712      K 4.6      CO2 29   *   BUN 16   CREA 0.82   CA 8.8   ALB 3.6   TBILI 0.2   ALT 26       EKG reviewed:     ECG 8/4/2021:  Sinus tachycardia, rate 106, NSSTTC, LAD      Radiology:    XR CHEST PORT    Result Date: 8/4/2021  No Acute Disease. I personally reviewed and interpreted the imaging studies and agree with official reading. Findings discussed with patient and son at bedside.     The chart, ER course, the above PMSFH, lab work, and radiological studies was reviewed by me on: August 4, 2021         Assessment/Plan:       NSTEMI / Chest pain / Dyspnea: Admit for further workup and treatment of possible ACS. CP admit protocol. NTG not given due to hypotension, BB held due to hypotension and/or bradycardia, ACEI/ARB not given due to hypotension, Morphine IV prn, Supplemental oxygen per protocol, Supportive care,  Labs: Chem panel/troponin/BNP, Follow CXR, Echo, Elevate HOB, I&Os, Daily wieghts and Cardiology consult    Elevated brain natriuretic peptide:  CXR ok. No signs of decompnesation likely due to NSTEMI. Lasix already given but will hold additional until BP improves. Echo. Hypothyroidism:  TSH and continue home dose    COPD with acute exacerbation: Duo nebs scheduled with as needed albuterol. Pulmicort nebs. Avoid corticosteroid at this time in the setting of NSTEMI unless the shortness of breath becomes extremely severe. HTN:   BP somewhat soft we will not be giving any blood pressure medications. DM (diabetes mellitus), type 2:  Monitor blood glucose levels with insulin sliding scale coverage. No basal coverage for now. Monitor for complications. A1C.        Body mass index is 26.44 kg/m².:  25.0 - 29.9 Overweight       Risk of deterioration: high      Discussed:  Code Status and Care Plan discussed with: Patient, Family at bedsude, ED Provider and RN    Prophylaxis:  Started on therapeutic heparin/lovenox    Probable disposition:  Home    Total time: 79 Minutes **I personally saw and examined the patient during this time period**    Date of service:    8/4/2021                ___________________________________________________    Admitting Physician: Melvi Cerrato MD

## 2021-08-05 NOTE — PROGRESS NOTES
NUTRITION     Nutrition screening referral was triggered based on results obtained during nursing admission assessment for Unintentional wt loss: 2-13#     Wt Readings from Last 30 Encounters:   08/05/21 60.8 kg (134 lb 0.6 oz)   08/04/21 60.8 kg (134 lb)   06/24/21 62.1 kg (137 lb)   01/12/21 60.8 kg (134 lb)   08/28/20 58.1 kg (128 lb)   07/28/20 58.1 kg (128 lb)   06/06/20 58.5 kg (129 lb)   05/12/20 59 kg (130 lb)   05/01/20 59 kg (130 lb)   04/03/20 59 kg (130 lb)      Pt is mostly wt stable +/- 5 lbs over the last 14 months of wt hx in chart. Consult if PO is less than 25% for >3 days. The patient's chart was reviewed and nutrition assessment is not indicated at this time. Plan to see patient for rescreen as indicated. Thank you.      Travon Mcmahon RD

## 2021-08-05 NOTE — PROGRESS NOTES
[facility]: MED RECONCILIATION    Medications added:     Montelukast 10 mg QPM  Metoprolol ER 25 mg daily  APAP as needed    Medications removed:    Chantix  Plavix  Risedronate    Medications adjusted:    Abilify @ bedtiesusy  Levothyroxine 125 mcg    Information obtained from: Patient, RxQuery, Chart review    Allergies: Patient has no known allergies. Prior to Admission Medications:     Medication Documentation Review Audit       Reviewed by BETH Navarro (Pharmacist) on 08/04/21 at 2228      Medication Sig Documenting Provider Last Dose Status Taking?   acetaminophen (TYLENOL) 325 mg tablet Take 650 mg by mouth every six (6) hours as needed for Pain (Mild to moderate pain). Provider, Historical 7/28/2021 Active Yes   albuterol (PROVENTIL HFA, VENTOLIN HFA, PROAIR HFA) 90 mcg/actuation inhaler Take 2 Puffs by inhalation every six (6) hours as needed for Wheezing. Provider, Historical 7/28/2021 Active Yes   albuterol-ipratropium (DUO-NEB) 2.5 mg-0.5 mg/3 ml nebu 3 mL by Nebulization route every four (4) hours as needed for Wheezing, Shortness of Breath or Respiratory Distress. Provider, Historical 7/28/2021 Active Yes   ARIPiprazole (Abilify) 10 mg tablet Take 10 mg by mouth nightly. Provider, Historical 8/3/2021 Active Yes   aspirin 81 mg chewable tablet Take 81 mg by mouth daily. Provider, Historical 8/4/2021 Active Yes   atorvastatin (LIPITOR) 80 mg tablet Take 1 Tab by mouth nightly. Peggy Blunt MD 8/3/2021 Active Yes   budesonide-formoteroL (Symbicort) 160-4.5 mcg/actuation HFAA Take 2 Puffs by inhalation two (2) times a day. Provider, Historical 8/4/2021 Active Yes   levothyroxine (SYNTHROID) 125 mcg tablet Take 125 mcg by mouth Daily (before breakfast). Provider, Historical 8/4/2021 Active Yes   metoprolol succinate (Toprol XL) 25 mg XL tablet Take 25 mg by mouth daily. Provider, Historical 8/4/2021 Active Yes   montelukast (SINGULAIR) 10 mg tablet Take 10 mg by mouth every evening.  Provider, Historical 8/3/2021 Active Yes   PARoxetine (PaxiL) 20 mg tablet Take 20 mg by mouth daily. Provider, Historical 8/4/2021 Active Yes           Med Note (Nighat Davenport   Mon Jun 1, 2020 10:07 AM)     traMADoL (ULTRAM) 50 mg tablet Take 50 mg by mouth every eight (8) hours as needed for Pain (Severe pain).  Provider, Historical 7/28/2021 Active Yes                  Mely Banks, PHARMD   Contact: [phone]

## 2021-08-05 NOTE — CARDIO/PULMONARY
[facility] Cardiopulmonary Rehab: 63 yo female admitted with Chest Pain & SOB (8/4). Per Dr Latanya Tubbs, dx includes: NSTEMI. S/P cardiac cath with moderate mild into distal LAD disease; no PCI indicated. LVEF 60-65% by echo (8/28/20). Cardiologist is Dr Omid Mariano.    Met with patient on [facility]. Pt reported she resides alone in [address] works as the  at [employer]. She has 2 sons and one daughter. Her son Christian Castro arrived during this session. Cardiovascular hx includes: PFO closure (6/5/20) following Lt frontoparietal stroke with dysarthria & Rt arm weakness, multifocal PVC with short runs of NSVT, and moderate carotid disease. Pt reported she has had \"2 strokes and a heart attack\" in the past.     Pt aware her cardiac enzymes are elevated and NSTEMI has been diagnosed. Also aware that she will have a cardiac cath today. Provided MI education folder. Reviewed CAD risk factors: smoker, HTN, HLD, high stress job, and post-menopausal. Pt reported she started smoking at age 14, smoked up to 1.5 ppd, and was able to quit for one year in the past. Resumed smoking due to stress. Currently, pt smokes 0.5 ppd. Pt indicated awareness that she needs to quit. Also reported she walks a great deal at work, but does not exercise regularly. Pt expressed interest in participating in cardiac rehab. She lives 60 minutes from [facility]. Pt reported she lives much closer to [place], where outpatient cardiac rehab is available. Contact info for [facility] attached to discharge instructions. Pt verbalized understanding of info provided. All questions were answered.

## 2021-08-05 NOTE — PROGRESS NOTES
12:00   TRANSFER - IN REPORT:    Verbal report received from [facility], RN(name) on Rodri Nessbush  being received from cath lab(unit) for routine post - op      Report consisted of patients Situation, Background, Assessment and   Recommendations(SBAR). Information from the following report(s) Procedure Summary was reviewed with the receiving nurse. Opportunity for questions and clarification was provided. Assessment completed upon patients arrival to unit and care assumed. 12:55 PM  3 ml air released from TR Band. No bleeding or hematoma noted. Radial and Ulnar pulse on R wrist palpable. Pt tolerated well. Will continue to monitor. 8 mL remaining.    1:00 PM  3 ml air released from TR Band. No bleeding or hematoma noted. Radial and Ulnar pulse on R wrist palpable. Pt tolerated well. Will continue to monitor. 5 mL remaining. 1:05 PM  5 ml air released from TR Band. No bleeding or hematoma noted. Radial and Ulnar pulse on R wrist palpable. Pt tolerated well. Will continue to monitor. Air release completed. TR Band removed from R wrist. No bleeding or  Hematoma. Dressing applied. Wrist immobilizer in place. Radial and ulnar pulse remain palpable on affected extremity. Pt tolerated well. Instructions given to pt regarding movement and activity restrictions. Pt voiced understanding.    2:01 PM  TRANSFER - OUT REPORT:    Verbal report given to April, RN(name) on Rodri Gentileh  being transferred to cath lab(unit) for routine progression of care       Report consisted of patients Situation, Background, Assessment and   Recommendations(SBAR). Information from the following report(s) Procedure Summary, Accordion, Recent Results and Cardiac Rhythm NSR was reviewed with the receiving nurse.     Lines:   Peripheral IV 08/04/21 Right;Posterior Hand (Active)   Site Assessment Clean, dry, & intact 08/05/21 0442   Phlebitis Assessment 0 08/05/21 0442   Infiltration Assessment 0 08/05/21 0442   Dressing Status Clean, dry, & intact 08/05/21 0442   Dressing Type Tape 08/05/21 0442   Alcohol Cap Used Yes 08/05/21 0442        Opportunity for questions and clarification was provided. Patient transported with:   Monitor  Registered Nurse    R radial site visualized with nurse at bedside.

## 2021-08-05 NOTE — PROCEDURES
BRIEF PROCEDURE NOTE    Date of Procedure: 8/5/2021   Preoperative Diagnosis: nstemi  Postoperative Diagnosis: nstemi  Procedure: Left heart cath, coronary angiography  Interventional Cardiologist: Srinivasa Oneill DO  Assistant: None  Anesthesia: local + IV moderate sedation   I administered moderate sedation throughout this procedure. An independent trained observer pushed medications at my direction, and monitored the patients level of consciousness and physiological status throughout. Estimated Blood Loss: Minimal    Access: right radial artery, 6F  Catheters:  Left coronary: JL 3.5, 5F  Right coronary: JR4, 5F    Findings:   L Main: large caliber vessel, normal  LAD: large caliber, wraps around apex supplying posterior interventricular septum, mid into distal LAD with tubular 50-60% stenosis with  HALEY 3 flow into apex, proximally LAD supplies a large caliber diagonal branch with distal trifurcation supplying majority of lateral wall  LCx: no true Lcx observed. Sub-selective angiography was performed within both right and left coronary cusps, no anomalous Lcx origin was appreciated  RCA:moderate caliber vessel, no PDA, moderate PL branch w/o critical disease    LVEDP:  15 mmhg      Specimens Removed: None    Implants: n/a    Closure Device: radial TR band    See full cath note. Complications: none      Findings:  1. Abnormal coronary circulation with no true Lcx, trifurcating diagonal   2. Moderate mid into distal LAD disease   3. Mildly elevated lvedp    Plan:    Cont gdmt for nstemi  Consider iFR guided revascularization of LAD if she continue to have chest pain symptoms.     DO Antonietta Vila DO  [facility] [address] [place] [address] Nw                                              Office [phone],PGZ [phone]

## 2021-08-05 NOTE — PROGRESS NOTES
Reason for Admission:  NSTEMI  Met with pt for d/c planning. Pt stated she was independent with ADL's. Medications are from [facility] in [place]. RUR Score:      18%               Plan for utilizing home health:      none    PCP: First and Last name:  Aidee Marshall NP     Name of Practice:    Are you a current patient: Yes/No: yes   Approximate date of last visit: July 5, 2021   Can you participate in a virtual visit with your PCP: yes                    Current Advanced Directive/Advance Care Plan: Full Code  Her 2 sons and daughter are her next of kin    Healthcare Decision Maker:   Click here to complete [address] including selection of the Healthcare Decision Maker Relationship (ie \"Primary\")                             Transition of Care Plan:                    1. Family to transport home at d/c  2. Cath done today 8/5  3. Pulmonary and cards following  4. Pt to wean off O2; may need home O2 eval  5. CM following for any needs    Care Management Interventions  PCP Verified by CM: Yes  Mode of Transport at Discharge: Other (see comment) (son)  Discharge Durable Medical Equipment:  (Has sp cane, quad cane and RW at home)  Current Support Network: Own Home, Lives Alone, Family Lives Nearby (1 level house with 2 entry steps)  Discharge Location  Discharge Placement: Home with family assistance  L. Lauren Hodgkins

## 2021-08-05 NOTE — CONSULTS
CARDIOLOGY CONSULTATION NOTE    Stanley Aldana MD,  [address], [place], [address] [facility]  Phone [phone]; Fax [phone]  Mobile [phone]   Voice Mail [phone]                               2021  4:26 PM  Primary Care Bearsacha CalderonBUCYK yeboah  :  1960   MRN:  [id number]     CC: Chest discomfort and shortness of breath    Reason for consult:  Elevated troponin and proBNP      Admission Diagnosis: NSTEMI (non-ST elevated myocardial infarction) ([facility] [address] [I21.4]; Chest pain [R07.9]; Elevated brain natriuretic peptide (BNP) level [R79.89]; Dyspnea [R06.00]         ATTENTION:   This medical record was transcribed using an electronic medical records/speech recognition system. Although proofread, it may and can contain electronic, spelling and other errors. Corrections may be executed at a later time. Please feel free to contact us for any clarifications as needed.         6) hypertension  Under good control currently continue current medical regimen  Impression Plan/Recommendation   1. Non-ST elevation MI  2. Elevated proBNP  3. Dyslipidemia   4. Diabetes  5. Hypertension  6. COPD  7. History of PFO closure 2020 off Plavix perform secondary to a complex fossa ovalis defect/ASD  8. Complex ventricular ectopy followed by Dr. Marvin Sun              H/H 14 and 44, potassium 5.2, troponin peaked at 2.31 at 5:00 at night yesterday on 2021, LDL is 106, proBNP is 7046, ECG sinus tachycardia with frequent PVCs and what appears to be fusion complexes. No ST changes. 1. Will go for with cardiac catheterization and anticoagulation patient understands risk and benefits and wishes to proceed  2. Risk and benefits of cardiac catheterization reviewed with the patient .  I reviewed he risks (including but not limited to death, myocardial infarction, cerebrovascular accident, dysrhythmia, renal failure, vascular complication, allergy, and/or need for emergency surgery), benefits, and alternatives have been explained. The patient wishes to proceed and is an ASA3 and Airway2  Verbal consent has been obtained. 3. Continues to have episodes of bigeminy and continue on her current dose of metoprolol  4. LDL goal for her should be 70 or lower and prefer should be on Crestor 40 mg a day  5. For going forward that she stop smoking and I discussed this with both her and her son. Heydi Hall is a 62 y.o. female I am seeing for elevated troponin  Ms. Praful Baptiste is a 56-year-old lady with history of hypertension, diabetes, dyslipidemia, COPD and tobacco abuse,CAD with MI approximately 4 to 5 years ago for follow closure back a year ago after a CVA. He now has been experiencing increased chest discomfort that is described as intermittent moderate severe intensity and retrosternal.  It is associated with some nausea and shortness of breath. 1 year ago she had a PFO closure with a 30 mm [place] cardio form device. At that time she was complaining of palpitations and short burst of atrial tachycardia. In the emergency room she had elevated proBNP over 7000 with a troponin of 2.31 EF by echo in 8/28/2020 was 60 to 65% she is now being admitted with NSTEMI and elevated proBNP     Cardiac risk factors: smoking/ tobacco exposure, dyslipidemia, diabetes mellitus, hypertension, post-menopausal.        No Known Allergies      Past Medical History:   Diagnosis Date    Chronic obstructive pulmonary disease ([address]     Diabetes ([facility] [address]     Borderline    Hypercholesteremia     Hypertension     Stroke ([facility] [address]     Thyroid disease         Past Surgical History:   Procedure Laterality Date    HX APPENDECTOMY      HX CHOLECYSTECTOMY      HX GYN      hysterectomy    MO CARDIAC SURG PROCEDURE UNLIST      cath        . Home Medications:  Prior to Admission Medications   Prescriptions Last Dose Informant Patient Reported? Taking?    ARIPiprazole (Abilify) 10 mg tablet 8/3/2021 Self Yes Yes   Sig: Take 10 mg by mouth nightly. PARoxetine (PaxiL) 20 mg tablet 8/4/2021 Self Yes Yes   Sig: Take 20 mg by mouth daily. acetaminophen (TYLENOL) 325 mg tablet 7/28/2021 Self Yes Yes   Sig: Take 650 mg by mouth every six (6) hours as needed for Pain (Mild to moderate pain). albuterol (PROVENTIL HFA, VENTOLIN HFA, PROAIR HFA) 90 mcg/actuation inhaler 7/28/2021 Self Yes Yes   Sig: Take 2 Puffs by inhalation every six (6) hours as needed for Wheezing. albuterol-ipratropium (DUO-NEB) 2.5 mg-0.5 mg/3 ml nebu 7/28/2021 Self Yes Yes   Sig: 3 mL by Nebulization route every four (4) hours as needed for Wheezing, Shortness of Breath or Respiratory Distress. aspirin 81 mg chewable tablet 8/4/2021 Self Yes Yes   Sig: Take 81 mg by mouth daily. atorvastatin (LIPITOR) 80 mg tablet 8/3/2021 Self No Yes   Sig: Take 1 Tab by mouth nightly. budesonide-formoteroL (Symbicort) 160-4.5 mcg/actuation HFAA 8/4/2021 Self Yes Yes   Sig: Take 2 Puffs by inhalation two (2) times a day. levothyroxine (SYNTHROID) 125 mcg tablet 8/4/2021 Self Yes Yes   Sig: Take 125 mcg by mouth Daily (before breakfast). metoprolol succinate (Toprol XL) 25 mg XL tablet 8/4/2021 Self Yes Yes   Sig: Take 25 mg by mouth daily. montelukast (SINGULAIR) 10 mg tablet 8/3/2021 Self Yes Yes   Sig: Take 10 mg by mouth every evening. traMADoL (ULTRAM) 50 mg tablet 7/28/2021 Self Yes Yes   Sig: Take 50 mg by mouth every eight (8) hours as needed for Pain (Severe pain).       Facility-Administered Medications: None       Hospital Medications:  Current Facility-Administered Medications   Medication Dose Route Frequency    montelukast (SINGULAIR) tablet 10 mg  10 mg Oral QPM    PARoxetine (PAXIL) tablet 20 mg  20 mg Oral DAILY    naloxone (NARCAN) injection 0.4 mg  0.4 mg IntraVENous EVERY 2 MINUTES AS NEEDED    sodium chloride (NS) flush 5-40 mL  5-40 mL IntraVENous Q8H    sodium chloride (NS) flush 5-40 mL  5-40 mL IntraVENous PRN    aspirin chewable tablet 81 mg  81 mg Oral DAILY    clopidogreL (PLAVIX) tablet 75 mg  75 mg Oral DAILY    atorvastatin (LIPITOR) tablet 80 mg  80 mg Oral QHS    acetaminophen (TYLENOL) tablet 650 mg  650 mg Oral Q4H PRN    oxyCODONE IR (ROXICODONE) tablet 5 mg  5 mg Oral Q4H PRN    LORazepam (ATIVAN) injection 1 mg  1 mg IntraVENous Q6H PRN    enoxaparin (LOVENOX) injection 60 mg  1 mg/kg SubCUTAneous Q12H    melatonin (rapid dissolve) tablet 5 mg  5 mg Oral QHS PRN    albuterol-ipratropium (DUO-NEB) 2.5 MG-0.5 MG/3 ML  3 mL Nebulization QID RT    albuterol (PROVENTIL VENTOLIN) nebulizer solution 2.5 mg  2.5 mg Nebulization Q4H PRN    budesonide (PULMICORT) 500 mcg/2 ml nebulizer suspension  500 mcg Nebulization BID RT    glucose chewable tablet 16 g  4 Tablet Oral PRN    dextrose (D50W) injection syrg 12.5-25 g  25-50 mL IntraVENous PRN    glucagon (GLUCAGEN) injection 1 mg  1 mg IntraMUSCular PRN    insulin lispro (HUMALOG) injection   SubCUTAneous Q6H    [Held by provider] levothyroxine (SYNTHROID) tablet 125 mcg  125 mcg Oral 6am    morphine injection 2 mg  2 mg IntraVENous Q3H PRN     Facility-Administered Medications Ordered in Other Encounters   Medication Dose Route Frequency    sodium chloride (NS) flush 10 mL  10 mL IntraVENous PRN          OBJECTIVE       Laboratory and Imaging have been reviewed and are notable for      ECG:  Date:  normal EKG, normal sinus rhythm, unchanged from previous tracings      Diagnostic Tests:     Recent Labs     08/05/21  0003   TROIQ 2.13*     Recent Labs     08/05/21  0620 08/04/21  1712    139   K 5.2* 4.6   CO2 26 29   BUN 26* 16   CREA 1.06* 0.82   * 175*   WBC  --  10.5   HGB  --  14.8   HCT  --  43.8   PLT  --  244         Cardiac work up to date:  1. Neck CTA 4/2020   - moderate bilateral ICA disease (left 60%)     2. Echo   4/3/20 - EF 60%, PFO significant right to left shunt   8/31/20 - EF 60-65%. Normal cavity size and wall thickness. No WMA.  30 mm [employer] Cardioform device closure device present in expected position without residual shunting. 3. Venous duplex    4/4/20 - no DVT     4. Event recorder    April 2020 - frequent PVCs, multifocal, short runs NSVT     5. Stress Test   Lexiscan cardiolite 5/1/20 - normal perfusion, EF 61%     6. Lipids   4/5/20- , HDL 43, .8,    6/23/21-, HDL 55, LDL 85, TG 88    7. PFO Closure -6/5/20   NO pulmonary HTN   Small sized Fossa ovalis defect/ASD with previously document bidirectional shunt and stroke   ICE guided closure of the ASD with 30 mm [place] cardioform device. Social History:  Social History     Tobacco Use    Smoking status: Current Every Day Smoker     Packs/day: 1.00    Smokeless tobacco: Never Used   Substance Use Topics    Alcohol use: Never       Family History:  Family History   Problem Relation Age of Onset    Stroke Neg Hx        Review of Symptoms:  A comprehensive review of systems was negative except for that written in the HPI. Physical Exam:      Visit Vitals  /62 (BP 1 Location: Left upper arm, BP Patient Position: At rest)   Pulse (!) 54   Temp 98.1 °F (36.7 °C)   Resp 16   Ht 5' (1.524 m)   Wt 134 lb 0.6 oz (60.8 kg)   SpO2 94% Comment: Simultaneous filing. User may not have seen previous data. BMI 26.18 kg/m²     General Appearance:  Well developed frail, well nourished,alert and oriented x 3, and individual in no acute distress. Ears/Nose/Mouth/Throat:   Hearing grossly normal.Normal oral mucosa,no scleral icterus     Neck: Supple no JVD or bruits,no cervical lymphadenopathy   Chest:   Lungs slight rhonchi anteriorly   Cardiovascular:  Regular rate and rhythm, with occasional extrasystoles   Abdomen:   Soft, non-tender, bowel sounds are active. No abdominal bruits   Extremities: No edema bilaterally. Pulses detected, no varicosities   Skin: Warm and dry.  No bruising  Neuro  Moves all extermities and neurologically intact I have discussed the diagnosis with the patient and the intended plan as seen in the above orders. Questions were answered concerning future plans. I have discussed medication side effects and warnings with the patient as well. Cecilio Campo is in agreement to the plan listed above and wishes to proceed. she  was instructed not to smoke, eat heart healthy diet  and to exercise. Thank you for the consult and the opportunity to be involved in the care of Cecilio Campo.         Elan Uriarte MD

## 2021-08-05 NOTE — ED NOTES
Notified Tammy Brown MD via [employer] about troponin 2.13 No new orders at this time. Will continue to monitor.

## 2021-08-05 NOTE — PROGRESS NOTES
[facility]  [address] [facility], [address] [place] [address]  [phone]      Medical Progress Note      NAME: Ale Castillo   :  1960  MRM:  [id number]    Date/Time: 2021        Assessment / Plan:     63 yo F w/ hx of HTN, DM, PFO s/p closure, COPD, CVA presenting w/ CP, found to have NSTEMI and COPD exacerbation       NSTEMI: LHC on  showed abnormal coronary circulation with no true Lcx, trifurcating diagonal and moderate mid into distal LAD disease. Cardiology recommends GDMT for ACS, including DAPT, statin. Not currently on BB due to active wheezing/COPD exacerbation. Consider iFR guided revascularization of LAD if pt continues to have chest pain symptoms. Smoking cessation      COPD exacerbation: diffuse exp wheezing. Cont duo-nebs, IV steroids, Singular, doxycycline. Pulmonology following      HTN (hypertension), benign: BP well controlled off metoprolol. Monitor       Hypothyroidism: TSH undetectable. FT4 on upper range of normal. HOLD Synthroid. DM (diabetes mellitus), type 2: not listing meds, she appears not appear to have DM with A1c 6.0%. Depression: cont Paxil      Tobacco abuse: 1/2 ppd active smoker. Recommended smoking cessation. Discussed possible pharmacotherapy including nicotine replacement therapy, Chantix/Wellbutrin. Provided number for 1-800-QUIT-NOW. I spent ~4 minutes (outside of the time documented for this note) exclusively focused on tobacco cessation counseling            Total time spent: 35 minutes  Time spent in the care of this patient including reviewing records, discussing with nursing and/or other providers on the treatment team, obtaining history and examining the patient, and discussing treatment plans.                   Care Plan discussed with: Patient, Nursing Staff and >50% of time spent in counseling and coordination of care    Discussed:  Care Plan and D/C Planning    Prophylaxis:  Lovenox    Disposition:  Home w/Family         Subjective:     Chief Complaint:  Follow up chest pain    Chart/notes/labs/studies reviewed, patient examined. Feels better. No CP. +SOB, +wheezing. No fever              Objective:       Vitals:        Last 24hrs VS reviewed since prior progress note. Most recent are:    Visit Vitals  /74 (BP 1 Location: Left upper arm, BP Patient Position: At rest)   Pulse (!) 109   Temp 98 °F (36.7 °C)   Resp 14   Ht 5' (1.524 m)   Wt 60.8 kg (134 lb 0.6 oz)   SpO2 93%   BMI 26.18 kg/m²     SpO2 Readings from Last 6 Encounters:   08/05/21 93%   06/24/21 94%   07/28/20 96%   06/06/20 97%   05/21/20 97%   04/05/20 97%    O2 Flow Rate (L/min): 2 l/min       Intake/Output Summary (Last 24 hours) at 8/5/2021 1731  Last data filed at 8/5/2021 1707  Gross per 24 hour   Intake 420 ml   Output 500 ml   Net -80 ml          Exam:     Physical Exam:    Gen:  Well-developed, well-nourished, in no acute distress  HEENT:  Atraumatic, normocephalic. Sclerae nonicteric, hearing intact to voice  Neck:  Supple, without apparent masses. Resp:  No accessory muscle use, increased WOB. Scattered exp wheezing  Card: RRR, without m/r/g. No LE edema  Abd:  +bowel sounds, soft, NTTP, nondistended. No HSM  Neuro: Face symmetric, speech fluent, follows commands appropriately, no focal weakness or numbness  Psych:  Alert, oriented x 3.  Good insight     Medications Reviewed: (see below)    Lab Data Reviewed: (see below)    ______________________________________________________________________    Medications:     Current Facility-Administered Medications   Medication Dose Route Frequency    montelukast (SINGULAIR) tablet 10 mg  10 mg Oral QPM    PARoxetine (PAXIL) tablet 20 mg  20 mg Oral DAILY    naloxone (NARCAN) injection 0.4 mg  0.4 mg IntraVENous EVERY 2 MINUTES AS NEEDED    budesonide (PULMICORT) 500 mcg/2 ml nebulizer suspension  1,000 mcg Nebulization BID RT    methylPREDNISolone (PF) (SOLU-MEDROL) injection 60 mg  60 mg IntraVENous Q8H    guaiFENesin ER (MUCINEX) tablet 600 mg  600 mg Oral Q12H    doxycycline (VIBRA-TABS) tablet 100 mg  100 mg Oral Q12H    sodium chloride (NS) flush 5-40 mL  5-40 mL IntraVENous Q8H    sodium chloride (NS) flush 5-40 mL  5-40 mL IntraVENous PRN    sodium chloride (NS) flush 5-40 mL  5-40 mL IntraVENous Q8H    sodium chloride (NS) flush 5-40 mL  5-40 mL IntraVENous PRN    aspirin chewable tablet 81 mg  81 mg Oral DAILY    clopidogreL (PLAVIX) tablet 75 mg  75 mg Oral DAILY    atorvastatin (LIPITOR) tablet 80 mg  80 mg Oral QHS    acetaminophen (TYLENOL) tablet 650 mg  650 mg Oral Q4H PRN    oxyCODONE IR (ROXICODONE) tablet 5 mg  5 mg Oral Q4H PRN    LORazepam (ATIVAN) injection 1 mg  1 mg IntraVENous Q6H PRN    enoxaparin (LOVENOX) injection 60 mg  1 mg/kg SubCUTAneous Q12H    melatonin (rapid dissolve) tablet 5 mg  5 mg Oral QHS PRN    albuterol-ipratropium (DUO-NEB) 2.5 MG-0.5 MG/3 ML  3 mL Nebulization QID RT    albuterol (PROVENTIL VENTOLIN) nebulizer solution 2.5 mg  2.5 mg Nebulization Q4H PRN    glucose chewable tablet 16 g  4 Tablet Oral PRN    dextrose (D50W) injection syrg 12.5-25 g  25-50 mL IntraVENous PRN    glucagon (GLUCAGEN) injection 1 mg  1 mg IntraMUSCular PRN    insulin lispro (HUMALOG) injection   SubCUTAneous Q6H    [Held by provider] levothyroxine (SYNTHROID) tablet 125 mcg  125 mcg Oral 6am    morphine injection 2 mg  2 mg IntraVENous Q3H PRN     Facility-Administered Medications Ordered in Other Encounters   Medication Dose Route Frequency    sodium chloride (NS) flush 10 mL  10 mL IntraVENous PRN            Lab Review:     Recent Labs     08/04/21  1712   WBC 10.5   HGB 14.8   HCT 43.8        Recent Labs     08/05/21  0620 08/04/21  1712    139   K 5.2* 4.6    107   CO2 26 29   * 175*   BUN 26* 16   CREA 1.06* 0.82   CA 8.5 8.8   ALB  --  3.6   ALT  --  26     No components found for: GLPOC  No results for input(s): PH, PCO2, PO2, HCO3, FIO2 in the last 72 hours. No results for input(s): INR, INREXT in the last 72 hours.   No results found for: SDES  No results found for: CULT           ___________________________________________________    Attending Physician: Emily Claudio MD

## 2021-08-05 NOTE — CONSULTS
[facility]  Pulmonary, Critical Care, and Sleep Medicine    Initial Patient Consult    Name: Naty Gonzalez MRN: [id number]   : 1960 Hospital: [facility]   Date: 2021        IMPRESSION:   · Copd exacerbation   ·  active tobacco use   · kathy eval in progress  · pulm nodules on ct screen done in [place] -  To be fu with repeat imaging in 2021  · Admitted with cp - hx of cad  · covid vaccinated - covid test neg   · Acute hypoxic resp failure - sats 94 % on 3 liters nc   · nstemi        RECOMMENDATIONS:   · Agree with nebs  ·  increase budesonide in neb   · Solumedrol and hope to wean soon   · Agree with dvt prophylaxis  · kathy eval outpt eval as per   Her pulm md in [place]  · Repeat ct imaging  As planned by her pulm  In 2021- unless  Needed in planning for her cardiac mgt  · Cards  Is seeing   · mucinex  · Doxy   · Fu echo   · bsl mgt on steroids        Subjective: This patient has been seen and evaluated at the request of    For copd exacerbation . Patient is a 62 y.o. female  With hx of copd, active tobacco use , cad, pfo closure, cva, dm, htn, hld . She  Was sent from her cardiologist office yesterday  To the er with copd exacerbation . She had cp yesterday and was noted to have elevated troponin and bnp. Cxr was without acute dx. Pt and son report that she wheeze every day but has gotten worse over the past week. She denies f,c,cp , pleurisy . She reported had nausea with the cp yesterday but today is free of cp or  Gi s/s . She noted increased lower ext edema recently . She smokes 1/2 ppd down from 2 ppd. She uses neb ( duoneb )  Prn and admits to overuse of  Her albuterol hfa. ( NO cp with the albuterol.) She was on symbicort but says it was dc and her pulm was trying to find another med that would be covered by her insurance. She is to undergo testing for kathy.    She has recent ct chest screen that detected 2 pulm nodules  That per son were thought to be possibly inflammatory as she has pulm congestion when it was done - to be repeated in nov. Her pulm DrLauren  Is in [address] Past Medical History:   Diagnosis Date    [facility] obstructive pulmonary disease ([facility]     Diabetes ([facility]     Borderline    Hypercholesteremia     Hypertension     Stroke ([facility]     Thyroid disease     cva  pfo  Past Surgical History:   Procedure Laterality Date    HX APPENDECTOMY      HX CHOLECYSTECTOMY      HX GYN      hysterectomy    PA CARDIAC SURG PROCEDURE UNLIST      cath    oophorectomy   pfo closure   Prior to Admission medications    Medication Sig Start Date End Date Taking? Authorizing Provider   acetaminophen (TYLENOL) 325 mg tablet Take 650 mg by mouth every six (6) hours as needed for Pain (Mild to moderate pain). Yes Provider, Historical   montelukast (SINGULAIR) 10 mg tablet Take 10 mg by mouth every evening. Yes Provider, Historical   metoprolol succinate (Toprol XL) 25 mg XL tablet Take 25 mg by mouth daily. Yes Provider, Historical   levothyroxine (SYNTHROID) 125 mcg tablet Take 125 mcg by mouth Daily (before breakfast). Yes Provider, Historical   traMADoL (ULTRAM) 50 mg tablet Take 50 mg by mouth every eight (8) hours as needed for Pain (Severe pain). 7/9/20  Yes Provider, Historical   albuterol-ipratropium (DUO-NEB) 2.5 mg-0.5 mg/3 ml nebu 3 mL by Nebulization route every four (4) hours as needed for Wheezing, Shortness of Breath or Respiratory Distress. 7/9/20  Yes Provider, Historical   atorvastatin (LIPITOR) 80 mg tablet Take 1 Tab by mouth nightly. 6/26/20  Yes Sally Perdomo MD   ARIPiprazole (Abilify) 10 mg tablet Take 10 mg by mouth nightly. 5/11/20  Yes Provider, Historical   aspirin 81 mg chewable tablet Take 81 mg by mouth daily. Yes Provider, Historical   PARoxetine (PaxiL) 20 mg tablet Take 20 mg by mouth daily.    Yes Provider, Historical   budesonide-formoteroL (Symbicort) 160-4.5 mcg/actuation HFAA Take 2 Puffs by inhalation two (2) times a day. Yes Provider, Historical   albuterol (PROVENTIL HFA, VENTOLIN HFA, PROAIR HFA) 90 mcg/actuation inhaler Take 2 Puffs by inhalation every six (6) hours as needed for Wheezing. Yes Provider, Historical     No Known Allergies   Social History     Tobacco Use    Smoking status: Current Every Day Smoker     Packs/day: 1.00    Smokeless tobacco: Never Used   Substance Use Topics    Alcohol use: Never       works in retail    no drugs, thc, etoh     Family History   Problem Relation Age of Onset    Stroke Neg Hx     lung dz in biological father  Cancer - adopted so does not know details    Current Facility-Administered Medications   Medication Dose Route Frequency    montelukast (SINGULAIR) tablet 10 mg  10 mg Oral QPM    PARoxetine (PAXIL) tablet 20 mg  20 mg Oral DAILY    budesonide (PULMICORT) 500 mcg/2 ml nebulizer suspension  1,000 mcg Nebulization BID RT    methylPREDNISolone (PF) (SOLU-MEDROL) injection 60 mg  60 mg IntraVENous Q8H    sodium chloride (NS) flush 5-40 mL  5-40 mL IntraVENous Q8H    aspirin chewable tablet 81 mg  81 mg Oral DAILY    clopidogreL (PLAVIX) tablet 75 mg  75 mg Oral DAILY    atorvastatin (LIPITOR) tablet 80 mg  80 mg Oral QHS    enoxaparin (LOVENOX) injection 60 mg  1 mg/kg SubCUTAneous Q12H    albuterol-ipratropium (DUO-NEB) 2.5 MG-0.5 MG/3 ML  3 mL Nebulization QID RT    insulin lispro (HUMALOG) injection   SubCUTAneous Q6H    [Held by provider] levothyroxine (SYNTHROID) tablet 125 mcg  125 mcg Oral 6am       Review of Systems:  A comprehensive review of systems was negative except for that written in the HPI. Objective:   Vital Signs:    Visit Vitals  /62 (BP 1 Location: Left upper arm, BP Patient Position: At rest)   Pulse (!) 54   Temp 98.1 °F (36.7 °C)   Resp 16   Ht 5' (1.524 m)   Wt 60.8 kg (134 lb 0.6 oz)   SpO2 94% Comment: Simultaneous filing. User may not have seen previous data.    BMI 26.18 kg/m²       O2 Device: Nasal cannula   O2 Flow Rate (L/min): 3 l/min   Temp (24hrs), Av.3 °F (36.8 °C), Min:97.8 °F (36.6 °C), Max:99 °F (37.2 °C)       Intake/Output:   Last shift:      701 -  1900  In: 120 [P.O.:120]  Out: -   Last 3 shifts: No intake/output data recorded. Intake/Output Summary (Last 24 hours) at 2021 1018  Last data filed at 2021 0940  Gross per 24 hour   Intake 120 ml   Output --   Net 120 ml      Physical Exam:   General:  Alert, cooperative, no distress, appears stated age. Head:  Normocephalic, without obvious abnormality, atraumatic. Eyes:  Conjunctivae/corneas clear. PERRL, EOMs intact. Nose: Nares normal. Septum midline. . No drainage, gruff voice    Throat: Lips, mucosa, and tongue normal. Teeth and gums normal.M4   Neck: Supple, symmetrical, trachea midline, no adenopathy, thyroid: no enlargment/tenderness/nodules, no JVD. Back:   Symmetric, no curvature. ROM normal.   Lungs:    bilateral wheezing, rhonchi, no rales , moist cough    Chest wall:  No tenderness or deformity. Heart:  Regular rate and rhythm, S1, S2 normal, no murmur, click, rub or gallop. Abdomen:   Soft, non-tender.  Bowel sounds normal.    Extremities: Extremities normal, atraumatic, no cyanosis - trace pretibial edema    Pulses:    Skin: Skin color, texture, turgor normal. No rashes or lesions   Lymph nodes: Cervical, supraclavicular,  nodes normal.   Neurologic: Grossly nonfocal     Data review:     Recent Results (from the past 24 hour(s))   CBC WITH AUTOMATED DIFF    Collection Time: 21  5:12 PM   Result Value Ref Range    WBC 10.5 3.6 - 11.0 K/uL    RBC 4.59 3.80 - 5.20 M/uL    HGB 14.8 11.5 - 16.0 g/dL    HCT 43.8 35.0 - 47.0 %    MCV 95.4 80.0 - 99.0 FL    MCH 32.2 26.0 - 34.0 PG    MCHC 33.8 30.0 - 36.5 g/dL    RDW 14.3 11.5 - 14.5 %    PLATELET 950 085 - 891 K/uL    MPV 9.1 8.9 - 12.9 FL    NRBC 0.0 0  WBC    ABSOLUTE NRBC 0.00 0.00 - 0.01 K/uL NEUTROPHILS 78 (H) 32 - 75 %    LYMPHOCYTES 14 12 - 49 %    MONOCYTES 8 5 - 13 %    EOSINOPHILS 0 0 - 7 %    BASOPHILS 0 0 - 1 %    IMMATURE GRANULOCYTES 0 0.0 - 0.5 %    ABS. NEUTROPHILS 8.2 (H) 1.8 - 8.0 K/UL    ABS. LYMPHOCYTES 1.5 0.8 - 3.5 K/UL    ABS. MONOCYTES 0.8 0.0 - 1.0 K/UL    ABS. EOSINOPHILS 0.0 0.0 - 0.4 K/UL    ABS. BASOPHILS 0.0 0.0 - 0.1 K/UL    ABS. IMM. GRANS. 0.0 0.00 - 0.04 K/UL    DF AUTOMATED     METABOLIC PANEL, COMPREHENSIVE    Collection Time: 08/04/21  5:12 PM   Result Value Ref Range    Sodium 139 136 - 145 mmol/L    Potassium 4.6 3.5 - 5.1 mmol/L    Chloride 107 97 - 108 mmol/L    CO2 29 21 - 32 mmol/L    Anion gap 3 (L) 5 - 15 mmol/L    Glucose 175 (H) 65 - 100 mg/dL    BUN 16 6 - 20 MG/DL    Creatinine 0.82 0.55 - 1.02 MG/DL    BUN/Creatinine ratio 20 12 - 20      GFR est AA >60 >60 ml/min/1.73m2    GFR est non-AA >60 >60 ml/min/1.73m2    Calcium 8.8 8.5 - 10.1 MG/DL    Bilirubin, total 0.2 0.2 - 1.0 MG/DL    ALT (SGPT) 26 12 - 78 U/L    AST (SGOT) 19 15 - 37 U/L    Alk.  phosphatase 98 45 - 117 U/L    Protein, total 7.2 6.4 - 8.2 g/dL    Albumin 3.6 3.5 - 5.0 g/dL    Globulin 3.6 2.0 - 4.0 g/dL    A-G Ratio 1.0 (L) 1.1 - 2.2     NT-PRO BNP    Collection Time: 08/04/21  5:12 PM   Result Value Ref Range    NT pro-BNP 7,046 (H) <125 PG/ML   TROPONIN I    Collection Time: 08/04/21  5:12 PM   Result Value Ref Range    Troponin-I, Qt. 2.31 (H) <0.05 ng/mL   COVID-19 RAPID TEST    Collection Time: 08/04/21  6:20 PM   Result Value Ref Range    Specimen source Nasopharyngeal      COVID-19 rapid test Not detected NOTD     EKG, 12 LEAD, INITIAL    Collection Time: 08/04/21  6:31 PM   Result Value Ref Range    Ventricular Rate 106 BPM    Atrial Rate 106 BPM    P-R Interval 136 ms    QRS Duration 70 ms    Q-T Interval 342 ms    QTC Calculation (Bezet) 454 ms    Calculated P Axis 70 degrees    Calculated R Axis -45 degrees    Calculated T Axis 75 degrees    Diagnosis       Sinus tachycardia with frequent premature ventricular complexes and fusion   complexes  Biatrial enlargement  Left axis deviation  Abnormal ECG  When compared with ECG of 24-JUN-2021 11:22,  fusion complexes are now present  QRS axis shifted left  Confirmed by Ankita Lux MD, [id number] ([id number]) on 8/5/2021 8:16:45 AM     TROPONIN I    Collection Time: 08/05/21 12:03 AM   Result Value Ref Range    Troponin-I, Qt. 2.13 (H) <0.05 ng/mL   HEMOGLOBIN A1C WITH EAG    Collection Time: 08/05/21 12:03 AM   Result Value Ref Range    Hemoglobin A1c 6.0 (H) 4.0 - 5.6 %    Est. average glucose 246 mg/dL   METABOLIC PANEL, BASIC    Collection Time: 08/05/21  6:20 AM   Result Value Ref Range    Sodium 136 136 - 145 mmol/L    Potassium 5.2 (H) 3.5 - 5.1 mmol/L    Chloride 107 97 - 108 mmol/L    CO2 26 21 - 32 mmol/L    Anion gap 3 (L) 5 - 15 mmol/L    Glucose 146 (H) 65 - 100 mg/dL    BUN 26 (H) 6 - 20 MG/DL    Creatinine 1.06 (H) 0.55 - 1.02 MG/DL    BUN/Creatinine ratio 25 (H) 12 - 20      GFR est AA >60 >60 ml/min/1.73m2    GFR est non-AA 53 (L) >60 ml/min/1.73m2    Calcium 8.5 8.5 - 10.1 MG/DL   TSH 3RD GENERATION    Collection Time: 08/05/21  6:20 AM   Result Value Ref Range    TSH <0.01 (L) 0.36 - 3.74 uIU/mL   LIPID PANEL    Collection Time: 08/05/21  6:20 AM   Result Value Ref Range    Cholesterol, total 188 <200 MG/DL    Triglyceride 172 (H) <150 MG/DL    HDL Cholesterol 47 MG/DL    LDL, calculated 106.6 (H) 0 - 100 MG/DL    VLDL, calculated 34.4 MG/DL    CHOL/HDL Ratio 4.0 0.0 - 5.0     GLUCOSE, POC    Collection Time: 08/05/21  7:40 AM   Result Value Ref Range    Glucose (POC) 153 (H) 65 - 117 mg/dL    Performed by Donis MCGOVERN    ECHO ADULT COMPLETE    Collection Time: 08/05/21  8:27 AM   Result Value Ref Range    IVSd 0.63 0.60 - 0.90 cm    LVIDd 4.52 3.90 - 5.30 cm    LVIDs 3.41 cm    LVOT d 2.18 cm    LVPWd 0.77 0.60 - 0.90 cm    BP EF 32.4 (A) 55.0 - 100.0 percent    LV Ejection Fraction MOD 2C 41 percent    LV Ejection Fraction MOD 4C 21 percent LV ED Vol A2C 83.60 mL    LV ED Vol A4C 55.79 mL    LV ED Vol BP 70.21 56.0 - 104.0 mL    LV ES Vol A2C 49.10 mL    LV ES Vol A4C 44.09 mL    LV ES Vol BP 47.49 19.0 - 49.0 mL    LVOT Peak Gradient 4.07 mmHg    LVOT Peak Velocity 100.88 cm/s    RVIDd 2.84 cm    Left Atrium Major Axis 3.09 cm    LA Volume 25.22 22.0 - 52.0 mL    LA Area 4C 10.20 cm2    LA Vol 2C 20.88 (A) 22.00 - 52.00 mL    LA Vol 4C 17.10 (A) 22.00 - 52.00 mL    LA Volume DISK BP 21.42 22.0 - 52.0 mL    Aortic Valve Area by Continuity of Peak Velocity 2.19 cm2    AoV PG 11.77 mmHg    Aortic Valve Systolic Peak Velocity 034.57 cm/s    MV A Fadi 105.55 centimeter/second    Mitral Valve E Wave Deceleration Time 175.18 ms    MV E Fadi 76.46 centimeter/second    LV E' Lateral Velocity 7.81 centimeter/second    LV E' Septal Velocity 6.60 centimeter/second    Mitral Valve Pressure Half-time 50.80 ms    MVA (PHT) 4.33 cm2    Pulmonic Valve Systolic Peak Instantaneous Gradient 4.13 mmHg    Pulmonic Valve Max Velocity 101.57 cm/s    Tapse 1.51 1.50 - 2.00 cm    Triscuspid Valve Regurgitation Peak Gradient 13.01 mmHg    TR Max Velocity 180.34 cm/s    AO ASC D 3.16 cm    Ao Root D 2.87 cm    IVC proximal 1.79 cm    LV Mass AL 96.4 67.0 - 162.0 g    LV Mass AL Index 61.4 43.0 - 95.0 g/m2    LVES Vol Index BP 30.2 mL/m2    LVED Vol Index BP 44.7 mL/m2    Left Atrium Minor Axis 1.97 cm    LA Vol Index 16.06 16.00 - 28.00 ml/m2    LA Vol Index 13.30 16.00 - 28.00 ml/m2    LA Vol Index 10.89 16.00 - 28.00 ml/m2    LVED Vol Index A4C 35.5 mL/m2    LVED Vol Index A2C 53.2 mL/m2    LVES Vol Index A4C 28.1 mL/m2    LVES Vol Index A2C 31.3 mL/m2    DEANNA/BSA Pk Fadi 1.4 cm2/m2       Imaging:  I have personally reviewed the patients radiographs and have reviewed the reports:  cxr - ap - nad         Steve Olivas MD

## 2021-08-05 NOTE — PROGRESS NOTES
0730 Bedside and Verbal shift change report given to Maureen Lindo RN (oncoming nurse) by Carol Francis RN (offgoing nurse). Report included the following information SBAR and Kardex. This patient was assisted with Intentional Toileting every 2 hours during this shift as appropriate. Documentation of ambulation and output reflected on Flowsheet as appropriate. Purposeful hourly rounding was completed using AIDET and 5Ps. Outcomes of PHR documented as they occurred. Bed alarm in use as appropriate. Dual Suction and ambubag in place. 1055 Patient left for cath lab.    1401 TRANSFER - IN REPORT:    Verbal report received from [id number] [employer] (name) on [facility]  being received from post cath (unit) for routine progression of care      Report consisted of patients Situation, Background, Assessment and   Recommendations(SBAR). Information from the following report(s) SBAR, Kardex and Procedure Summary was reviewed with the receiving nurse. Opportunity for questions and clarification was provided. Assessment completed upon patients arrival to unit and care assumed. 1419 Patient returned to room. 1930 Bedside and Verbal shift change report given to Luigi Wilson RN (oncoming nurse) by Maureen Vanegas RN (offgoing nurse). Report included the following information SBAR and Kardex.

## 2021-08-06 LAB
ANION GAP SERPL CALC-SCNC: 4 MMOL/L (ref 5–15)
BASOPHILS # BLD: 0 K/UL (ref 0–0.1)
BASOPHILS NFR BLD: 0 % (ref 0–1)
BUN SERPL-MCNC: 22 MG/DL (ref 6–20)
BUN/CREAT SERPL: 34 (ref 12–20)
CALCIUM SERPL-MCNC: 8.1 MG/DL (ref 8.5–10.1)
CHLORIDE SERPL-SCNC: 108 MMOL/L (ref 97–108)
CO2 SERPL-SCNC: 30 MMOL/L (ref 21–32)
CREAT SERPL-MCNC: 0.64 MG/DL (ref 0.55–1.02)
DIFFERENTIAL METHOD BLD: ABNORMAL
EOSINOPHIL # BLD: 0 K/UL (ref 0–0.4)
EOSINOPHIL NFR BLD: 0 % (ref 0–7)
ERYTHROCYTE [DISTWIDTH] IN BLOOD BY AUTOMATED COUNT: 13.9 % (ref 11.5–14.5)
GLUCOSE BLD STRIP.AUTO-MCNC: 117 MG/DL (ref 65–117)
GLUCOSE BLD STRIP.AUTO-MCNC: 147 MG/DL (ref 65–117)
GLUCOSE BLD STRIP.AUTO-MCNC: 157 MG/DL (ref 65–117)
GLUCOSE BLD STRIP.AUTO-MCNC: 241 MG/DL (ref 65–117)
GLUCOSE SERPL-MCNC: 167 MG/DL (ref 65–100)
HCT VFR BLD AUTO: 40.8 % (ref 35–47)
HGB BLD-MCNC: 12.9 G/DL (ref 11.5–16)
IMM GRANULOCYTES # BLD AUTO: 0.1 K/UL (ref 0–0.04)
IMM GRANULOCYTES NFR BLD AUTO: 1 % (ref 0–0.5)
LYMPHOCYTES # BLD: 1 K/UL (ref 0.8–3.5)
LYMPHOCYTES NFR BLD: 9 % (ref 12–49)
MAGNESIUM SERPL-MCNC: 2.1 MG/DL (ref 1.6–2.4)
MCH RBC QN AUTO: 31.5 PG (ref 26–34)
MCHC RBC AUTO-ENTMCNC: 31.6 G/DL (ref 30–36.5)
MCV RBC AUTO: 99.5 FL (ref 80–99)
MONOCYTES # BLD: 0.3 K/UL (ref 0–1)
MONOCYTES NFR BLD: 3 % (ref 5–13)
NEUTS SEG # BLD: 9.1 K/UL (ref 1.8–8)
NEUTS SEG NFR BLD: 87 % (ref 32–75)
NRBC # BLD: 0 K/UL (ref 0–0.01)
NRBC BLD-RTO: 0 PER 100 WBC
PLATELET # BLD AUTO: 234 K/UL (ref 150–400)
PMV BLD AUTO: 9.4 FL (ref 8.9–12.9)
POTASSIUM SERPL-SCNC: 4.4 MMOL/L (ref 3.5–5.1)
RBC # BLD AUTO: 4.1 M/UL (ref 3.8–5.2)
SERVICE CMNT-IMP: ABNORMAL
SERVICE CMNT-IMP: NORMAL
SODIUM SERPL-SCNC: 142 MMOL/L (ref 136–145)
WBC # BLD AUTO: 10.5 K/UL (ref 3.6–11)

## 2021-08-06 PROCEDURE — 74011250637 HC RX REV CODE- 250/637: Performed by: HOSPITALIST

## 2021-08-06 PROCEDURE — 74011000250 HC RX REV CODE- 250: Performed by: INTERNAL MEDICINE

## 2021-08-06 PROCEDURE — 82962 GLUCOSE BLOOD TEST: CPT

## 2021-08-06 PROCEDURE — 74011250637 HC RX REV CODE- 250/637: Performed by: NURSE PRACTITIONER

## 2021-08-06 PROCEDURE — 80048 BASIC METABOLIC PNL TOTAL CA: CPT

## 2021-08-06 PROCEDURE — 99232 SBSQ HOSP IP/OBS MODERATE 35: CPT | Performed by: SPECIALIST

## 2021-08-06 PROCEDURE — 77010033678 HC OXYGEN DAILY

## 2021-08-06 PROCEDURE — 85025 COMPLETE CBC W/AUTO DIFF WBC: CPT

## 2021-08-06 PROCEDURE — 83735 ASSAY OF MAGNESIUM: CPT

## 2021-08-06 PROCEDURE — 74011636637 HC RX REV CODE- 636/637: Performed by: INTERNAL MEDICINE

## 2021-08-06 PROCEDURE — 74011250636 HC RX REV CODE- 250/636: Performed by: HOSPITALIST

## 2021-08-06 PROCEDURE — 74011000250 HC RX REV CODE- 250: Performed by: HOSPITALIST

## 2021-08-06 PROCEDURE — 74011250636 HC RX REV CODE- 250/636: Performed by: INTERNAL MEDICINE

## 2021-08-06 PROCEDURE — 94640 AIRWAY INHALATION TREATMENT: CPT

## 2021-08-06 PROCEDURE — 74011250637 HC RX REV CODE- 250/637: Performed by: INTERNAL MEDICINE

## 2021-08-06 PROCEDURE — 36415 COLL VENOUS BLD VENIPUNCTURE: CPT

## 2021-08-06 PROCEDURE — 65660000000 HC RM CCU STEPDOWN

## 2021-08-06 PROCEDURE — 94664 DEMO&/EVAL PT USE INHALER: CPT

## 2021-08-06 RX ORDER — IPRATROPIUM BROMIDE AND ALBUTEROL SULFATE 2.5; .5 MG/3ML; MG/3ML
3 SOLUTION RESPIRATORY (INHALATION)
Status: DISCONTINUED | OUTPATIENT
Start: 2021-08-06 | End: 2021-08-10 | Stop reason: HOSPADM

## 2021-08-06 RX ORDER — ARFORMOTEROL TARTRATE 15 UG/2ML
15 SOLUTION RESPIRATORY (INHALATION)
Status: DISCONTINUED | OUTPATIENT
Start: 2021-08-06 | End: 2021-08-10 | Stop reason: HOSPADM

## 2021-08-06 RX ORDER — METOPROLOL SUCCINATE 25 MG/1
25 TABLET, EXTENDED RELEASE ORAL DAILY
Status: DISCONTINUED | OUTPATIENT
Start: 2021-08-06 | End: 2021-08-10 | Stop reason: HOSPADM

## 2021-08-06 RX ORDER — FAMOTIDINE 20 MG/1
20 TABLET, FILM COATED ORAL DAILY
Status: DISCONTINUED | OUTPATIENT
Start: 2021-08-06 | End: 2021-08-10 | Stop reason: HOSPADM

## 2021-08-06 RX ORDER — INSULIN LISPRO 100 [IU]/ML
INJECTION, SOLUTION INTRAVENOUS; SUBCUTANEOUS
Status: DISCONTINUED | OUTPATIENT
Start: 2021-08-06 | End: 2021-08-10 | Stop reason: HOSPADM

## 2021-08-06 RX ORDER — ISOSORBIDE MONONITRATE 30 MG/1
30 TABLET, EXTENDED RELEASE ORAL DAILY
Status: DISCONTINUED | OUTPATIENT
Start: 2021-08-06 | End: 2021-08-10 | Stop reason: HOSPADM

## 2021-08-06 RX ADMIN — ASPIRIN 81 MG: 81 TABLET, CHEWABLE ORAL at 08:40

## 2021-08-06 RX ADMIN — GUAIFENESIN 600 MG: 600 TABLET ORAL at 08:39

## 2021-08-06 RX ADMIN — Medication 10 ML: at 06:19

## 2021-08-06 RX ADMIN — PAROXETINE HYDROCHLORIDE 20 MG: 20 TABLET, FILM COATED ORAL at 08:39

## 2021-08-06 RX ADMIN — Medication 10 ML: at 23:23

## 2021-08-06 RX ADMIN — BUDESONIDE 500 MCG: 0.5 SUSPENSION RESPIRATORY (INHALATION) at 20:47

## 2021-08-06 RX ADMIN — ENOXAPARIN SODIUM 60 MG: 60 INJECTION SUBCUTANEOUS at 08:40

## 2021-08-06 RX ADMIN — CLOPIDOGREL BISULFATE 75 MG: 75 TABLET, FILM COATED ORAL at 08:39

## 2021-08-06 RX ADMIN — MONTELUKAST 10 MG: 10 TABLET, FILM COATED ORAL at 15:49

## 2021-08-06 RX ADMIN — ATORVASTATIN CALCIUM 80 MG: 20 TABLET, FILM COATED ORAL at 23:21

## 2021-08-06 RX ADMIN — INSULIN LISPRO 1 UNITS: 100 INJECTION, SOLUTION INTRAVENOUS; SUBCUTANEOUS at 23:22

## 2021-08-06 RX ADMIN — ENOXAPARIN SODIUM 60 MG: 60 INJECTION SUBCUTANEOUS at 20:31

## 2021-08-06 RX ADMIN — ARFORMOTEROL TARTRATE 15 MCG: 15 SOLUTION RESPIRATORY (INHALATION) at 14:19

## 2021-08-06 RX ADMIN — ARFORMOTEROL TARTRATE 15 MCG: 15 SOLUTION RESPIRATORY (INHALATION) at 20:47

## 2021-08-06 RX ADMIN — Medication 10 ML: at 20:32

## 2021-08-06 RX ADMIN — METHYLPREDNISOLONE SODIUM SUCCINATE 60 MG: 125 INJECTION, POWDER, FOR SOLUTION INTRAMUSCULAR; INTRAVENOUS at 15:39

## 2021-08-06 RX ADMIN — IPRATROPIUM BROMIDE AND ALBUTEROL SULFATE 3 ML: .5; 3 SOLUTION RESPIRATORY (INHALATION) at 07:25

## 2021-08-06 RX ADMIN — IPRATROPIUM BROMIDE AND ALBUTEROL SULFATE 3 ML: .5; 3 SOLUTION RESPIRATORY (INHALATION) at 20:33

## 2021-08-06 RX ADMIN — DOXYCYCLINE HYCLATE 100 MG: 100 TABLET, COATED ORAL at 20:32

## 2021-08-06 RX ADMIN — METHYLPREDNISOLONE SODIUM SUCCINATE 60 MG: 125 INJECTION, POWDER, FOR SOLUTION INTRAMUSCULAR; INTRAVENOUS at 23:21

## 2021-08-06 RX ADMIN — FAMOTIDINE 20 MG: 20 TABLET ORAL at 12:03

## 2021-08-06 RX ADMIN — Medication 10 ML: at 15:40

## 2021-08-06 RX ADMIN — DOXYCYCLINE HYCLATE 100 MG: 100 TABLET, COATED ORAL at 08:39

## 2021-08-06 RX ADMIN — GUAIFENESIN 600 MG: 600 TABLET ORAL at 20:32

## 2021-08-06 RX ADMIN — METHYLPREDNISOLONE SODIUM SUCCINATE 60 MG: 125 INJECTION, POWDER, FOR SOLUTION INTRAMUSCULAR; INTRAVENOUS at 06:19

## 2021-08-06 RX ADMIN — IPRATROPIUM BROMIDE AND ALBUTEROL SULFATE 3 ML: .5; 3 SOLUTION RESPIRATORY (INHALATION) at 14:19

## 2021-08-06 RX ADMIN — METOPROLOL SUCCINATE 25 MG: 25 TABLET, EXTENDED RELEASE ORAL at 08:45

## 2021-08-06 RX ADMIN — BUDESONIDE 500 MCG: 0.5 SUSPENSION RESPIRATORY (INHALATION) at 07:32

## 2021-08-06 NOTE — PROGRESS NOTES
Transition of Care Plan:   RUR-20%                 1. Family to transport home at d/c  2. Cath done 8/5  3. Pulmonary and cards following  4. Pt to wean off O2; will need home O2 eval  5. CM following for any needs  FLAVIA Chau

## 2021-08-06 NOTE — PROGRESS NOTES
0730 Bedside and Verbal shift change report given to Maureen Redman RN (oncoming nurse) by Garcia Lemus RN (offgoing nurse). Report included the following information SBAR and Kardex. This patient was assisted with Intentional Toileting every 2 hours during this shift as appropriate. Documentation of ambulation and output reflected on Flowsheet as appropriate. Purposeful hourly rounding was completed using AIDET and 5Ps. Outcomes of PHR documented as they occurred. Bed alarm in use as appropriate. Dual Suction and ambubag in place. 0807 HR into 140s patient had breathing treatment, coughing at time. Up to bathroom also. 0840 HR into 140s again, [place] [employer] NP aware, orders noted. 1149 Patient in ventricular bigeminy, Dr Dave Oneal on unit, notified of ventricular bigeminy and current /73 HR 65, patient also request pepcid for acid reflex. Also sent message to [employer] NP about new ventricular bigeminy. 1930 Bedside and Verbal shift change report given to Pedro Luis Lopez RN (oncoming nurse) by Maureen Vanegas RN (offgoing nurse). Report included the following information SBAR and Kardex.

## 2021-08-06 NOTE — PROGRESS NOTES
[facility]  [address] [facility], [place], [address] [place] [address]  [phone]      Medical Progress Note      NAME: Pietro Mckeon   :  1960  MRM:  [id number]    Date/Time: 2021        Assessment / Plan:     63 yo F w/ hx of HTN, DM, PFO s/p closure, COPD, CVA presenting w/ CP, found to have NSTEMI and COPD exacerbation       NSTEMI: LHC on  showed abnormal coronary circulation with no true Lcx, trifurcating diagonal and moderate mid into distal LAD disease. Cardiology recommends GDMT for ACS, including DAPT, statin. Not currently on BB due to active wheezing/COPD exacerbation. Consider iFR guided revascularization of LAD if pt continues to have chest pain symptoms. Smoking cessation again discussed as below. Cardiology added ISMN, metoprolol. Monitor for hypotension      COPD exacerbation: diffuse exp wheezing persists. Cont duo-nebs. Would continue IV steroids today. Cont Singular, doxycycline. Pulmonology following      HTN (hypertension), benign: BP controlled. Monitor for hypotension on nitrate and BB      Hypothyroidism: TSH undetectable. FT4 on upper range of normal. Holding Synthroid. DM (diabetes mellitus), type 2: not listing meds, she does not appear to have DM with A1c 6.0%. Cont SSI while on IV steroids      Depression: cont Paxil      Tobacco abuse: 1/2 ppd active smoker. Again recommended smoking cessation, with her son present. I spent a considerable amount of time counseling her on this. Total time spent: 35 minutes  Time spent in the care of this patient including reviewing records, discussing with nursing and/or other providers on the treatment team, obtaining history and examining the patient, and discussing treatment plans.                   Care Plan discussed with: Patient, Nursing Staff and >50% of time spent in counseling and coordination of care    Discussed:  Care Plan and D/C Planning    Prophylaxis:  Lovenox    Disposition:  Home w/Family         Subjective:     Chief Complaint:  Follow up chest pain    Chart/notes/labs/studies reviewed, patient examined. Wheezing, +SOB. No CP. No fever          Objective:       Vitals:        Last 24hrs VS reviewed since prior progress note. Most recent are:    Visit Vitals  /74 (BP 1 Location: Left upper arm, BP Patient Position: Sitting)   Pulse 65   Temp 97.7 °F (36.5 °C)   Resp 17   Ht 5' (1.524 m)   Wt 61.8 kg (136 lb 3.2 oz)   SpO2 93%   BMI 26.60 kg/m²     SpO2 Readings from Last 6 Encounters:   08/06/21 93%   06/24/21 94%   07/28/20 96%   06/06/20 97%   05/21/20 97%   04/05/20 97%    O2 Flow Rate (L/min): 2 l/min       Intake/Output Summary (Last 24 hours) at 8/6/2021 1152  Last data filed at 8/6/2021 0813  Gross per 24 hour   Intake 1400 ml   Output 1300 ml   Net 100 ml          Exam:     Physical Exam:    Gen:  Well-developed, well-nourished, in no acute distress  HEENT:  Atraumatic, normocephalic. Sclerae nonicteric, hearing intact to voice  Neck:  Supple, without apparent masses. Resp:  No accessory muscle use, increased WOB. Diffuse scattered exp wheezing  Card: RRR, without m/r/g. No LE edema  Abd:  +bowel sounds, soft, NTTP, nondistended. No HSM  Neuro: Face symmetric, speech fluent, follows commands appropriately, no focal weakness or numbness  Psych:  Alert, oriented x 3.  Good insight     Medications Reviewed: (see below)    Lab Data Reviewed: (see below)    ______________________________________________________________________    Medications:     Current Facility-Administered Medications   Medication Dose Route Frequency    metoprolol succinate (TOPROL-XL) XL tablet 25 mg  25 mg Oral DAILY    arformoteroL (BROVANA) neb solution 15 mcg  15 mcg Nebulization BID RT    albuterol-ipratropium (DUO-NEB) 2.5 MG-0.5 MG/3 ML  3 mL Nebulization Q6H RT    isosorbide mononitrate ER (IMDUR) tablet 30 mg  30 mg Oral DAILY    famotidine (PEPCID) tablet 20 mg  20 mg Oral DAILY    insulin lispro (HUMALOG) injection   SubCUTAneous AC&HS    montelukast (SINGULAIR) tablet 10 mg  10 mg Oral QPM    PARoxetine (PAXIL) tablet 20 mg  20 mg Oral DAILY    naloxone (NARCAN) injection 0.4 mg  0.4 mg IntraVENous EVERY 2 MINUTES AS NEEDED    budesonide (PULMICORT) 500 mcg/2 ml nebulizer suspension  1,000 mcg Nebulization BID RT    methylPREDNISolone (PF) (SOLU-MEDROL) injection 60 mg  60 mg IntraVENous Q8H    guaiFENesin ER (MUCINEX) tablet 600 mg  600 mg Oral Q12H    doxycycline (VIBRA-TABS) tablet 100 mg  100 mg Oral Q12H    sodium chloride (NS) flush 5-40 mL  5-40 mL IntraVENous Q8H    sodium chloride (NS) flush 5-40 mL  5-40 mL IntraVENous PRN    sodium chloride (NS) flush 5-40 mL  5-40 mL IntraVENous Q8H    sodium chloride (NS) flush 5-40 mL  5-40 mL IntraVENous PRN    aspirin chewable tablet 81 mg  81 mg Oral DAILY    clopidogreL (PLAVIX) tablet 75 mg  75 mg Oral DAILY    atorvastatin (LIPITOR) tablet 80 mg  80 mg Oral QHS    acetaminophen (TYLENOL) tablet 650 mg  650 mg Oral Q4H PRN    oxyCODONE IR (ROXICODONE) tablet 5 mg  5 mg Oral Q4H PRN    LORazepam (ATIVAN) injection 1 mg  1 mg IntraVENous Q6H PRN    enoxaparin (LOVENOX) injection 60 mg  1 mg/kg SubCUTAneous Q12H    melatonin (rapid dissolve) tablet 5 mg  5 mg Oral QHS PRN    albuterol (PROVENTIL VENTOLIN) nebulizer solution 2.5 mg  2.5 mg Nebulization Q4H PRN    glucose chewable tablet 16 g  4 Tablet Oral PRN    dextrose (D50W) injection syrg 12.5-25 g  25-50 mL IntraVENous PRN    glucagon (GLUCAGEN) injection 1 mg  1 mg IntraMUSCular PRN    [Held by provider] levothyroxine (SYNTHROID) tablet 125 mcg  125 mcg Oral 6am    morphine injection 2 mg  2 mg IntraVENous Q3H PRN     Facility-Administered Medications Ordered in Other Encounters   Medication Dose Route Frequency    sodium chloride (NS) flush 10 mL  10 mL IntraVENous PRN            Lab Review:     Recent Labs     08/06/21  0121 08/04/21  1712   WBC 10.5 10.5   HGB 12.9 14.8   HCT 40.8 43.8    244     Recent Labs     08/06/21  0121 08/05/21  0620 08/04/21  1712    136 139   K 4.4 5.2* 4.6    107 107   CO2 30 26 29   * 146* 175*   BUN 22* 26* 16   CREA 0.64 1.06* 0.82   CA 8.1* 8.5 8.8   MG 2.1  --   --    ALB  --   --  3.6   ALT  --   --  26     No components found for: GLPOC  No results for input(s): PH, PCO2, PO2, HCO3, FIO2 in the last 72 hours. No results for input(s): INR, INREXT, INREXT in the last 72 hours.   No results found for: SDES  No results found for: CULT           ___________________________________________________    Attending Physician: Cookie Valenzuela MD

## 2021-08-06 NOTE — PROGRESS NOTES
[facility]  Pulmonary, Critical Care, and Sleep Medicine    Initial Patient Consult    Name: Melanie Montalvo MRN: [id number]   : 1960 Hospital: [facility]   Date: 2021        IMPRESSION:   · Copd exacerbation - has a ways to go !!  ·  active tobacco use - most recently 1/2 ppd per her report   · kathy eval in progress by pulm in [place]   · pulm nodules on ct screen done in [place] -  To be fu with repeat imaging in 2021  · Admitted with cp/ nstemi - hx of cad- s/p cath   · covid vaccinated - covid test neg   · Acute hypoxic resp failure - sats 94 % on 1.5 liters nc  ( down from 3 liters)        RECOMMENDATIONS:   · Agree with nebs- adjusted  To add brovana  ·  increase budesonide in neb   · Solumedrol   · Currently on therapeutic lovenox as per cards   · kathy eval outpt eval as per   Her pulm md in [place]  · Repeat ct imaging  As planned by her pulm  In 2021- unless  Needed in planning for her cardiac mgt  · Cards  Is seeing  And has signed off to fu as outpt   · mucinex  · Doxy   · Fu echo - lvef 40 %. · bsl mgt on steroids   · Nicotine patch might  Be needed but would check with cards first to see if they are ok with this if she asks for it. · Pt wants to transfer her care to par  So par and her cardiologist can  Coordinate care. Will arrange par fu post dc. · Par will check on sat        Subjective: This patient has been seen and evaluated at the request of    For copd exacerbation . Patient is a 62 y.o. female  With hx of copd, active tobacco use , cad, pfo closure, cva, dm, htn, hld . She  Was sent from her cardiologist office yesterday  To the er with copd exacerbation . She had cp yesterday and was noted to have elevated troponin and bnp. Cxr was without acute dx. Pt and son report that she wheeze every day but has gotten worse over the past week. She denies f,c,cp , pleurisy .  She reported had nausea with the cp yesterday but today is free of cp or  Gi s/s . She noted increased lower ext edema recently . She smokes 1/2 ppd down from 2 ppd. She uses neb ( duoneb )  Prn and admits to overuse of  Her albuterol hfa. ( NO cp with the albuterol.) She was on symbicort but says it was dc and her pulm was trying to find another med that would be covered by her insurance. She is to undergo testing for kathy. She has recent ct chest screen that detected 2 pulm nodules  That per son were thought to be possibly inflammatory as she has pulm congestion when it was done - to be repeated in nov. Her pulm DrLauren  Is in [address] 8/6- chest is sore from coughing . Still wheezing. Was last hospitalized a month ago in [place] for 4 days per her report with copd exacerbation. Past Medical History:   Diagnosis Date    Chronic obstructive pulmonary disease ([address]     Diabetes [facility]     Borderline    Hypercholesteremia     Hypertension     Stroke ([facility]     Thyroid disease     cva  pfo  Past Surgical History:   Procedure Laterality Date    HX APPENDECTOMY      HX CHOLECYSTECTOMY      HX GYN      hysterectomy    [place] CARDIAC SURG PROCEDURE UNLIST      cath    oophorectomy   pfo closure   Prior to Admission medications    Medication Sig Start Date End Date Taking? Authorizing Provider   acetaminophen (TYLENOL) 325 mg tablet Take 650 mg by mouth every six (6) hours as needed for Pain (Mild to moderate pain). Yes Provider, Historical   montelukast (SINGULAIR) 10 mg tablet Take 10 mg by mouth every evening. Yes Provider, Historical   metoprolol succinate (Toprol XL) 25 mg XL tablet Take 25 mg by mouth daily. Yes Provider, Historical   levothyroxine (SYNTHROID) 125 mcg tablet Take 125 mcg by mouth Daily (before breakfast). Yes Provider, Historical   traMADoL (ULTRAM) 50 mg tablet Take 50 mg by mouth every eight (8) hours as needed for Pain (Severe pain).  7/9/20  Yes Provider, Historical   albuterol-ipratropium (Sandi Pollack) 2.5 mg-0.5 mg/3 ml nebu 3 mL by Nebulization route every four (4) hours as needed for Wheezing, Shortness of Breath or Respiratory Distress. 7/9/20  Yes Provider, Historical   atorvastatin (LIPITOR) 80 mg tablet Take 1 Tab by mouth nightly. 6/26/20  Yes Carol Hernandez MD   ARIPiprazole (Abilify) 10 mg tablet Take 10 mg by mouth nightly. 5/11/20  Yes Provider, Historical   aspirin 81 mg chewable tablet Take 81 mg by mouth daily. Yes Provider, Historical   PARoxetine (PaxiL) 20 mg tablet Take 20 mg by mouth daily. Yes Provider, Historical   budesonide-formoteroL (Symbicort) 160-4.5 mcg/actuation HFAA Take 2 Puffs by inhalation two (2) times a day. Yes Provider, Historical   albuterol (PROVENTIL HFA, VENTOLIN HFA, PROAIR HFA) 90 mcg/actuation inhaler Take 2 Puffs by inhalation every six (6) hours as needed for Wheezing.    Yes Provider, Historical     No Known Allergies   Social History     Tobacco Use    Smoking status: Current Every Day Smoker     Packs/day: 1.00    Smokeless tobacco: Never Used   Substance Use Topics    Alcohol use: Never       works in retail    no drugs, thc, etoh     Family History   Problem Relation Age of Onset    Stroke Neg Hx     lung dz in biological father  Cancer - adopted so does not know details    Current Facility-Administered Medications   Medication Dose Route Frequency    metoprolol succinate (TOPROL-XL) XL tablet 25 mg  25 mg Oral DAILY    arformoteroL (BROVANA) neb solution 15 mcg  15 mcg Nebulization BID RT    albuterol-ipratropium (DUO-NEB) 2.5 MG-0.5 MG/3 ML  3 mL Nebulization Q6H RT    montelukast (SINGULAIR) tablet 10 mg  10 mg Oral QPM    PARoxetine (PAXIL) tablet 20 mg  20 mg Oral DAILY    budesonide (PULMICORT) 500 mcg/2 ml nebulizer suspension  1,000 mcg Nebulization BID RT    methylPREDNISolone (PF) (SOLU-MEDROL) injection 60 mg  60 mg IntraVENous Q8H    guaiFENesin ER (MUCINEX) tablet 600 mg  600 mg Oral Q12H    doxycycline (VIBRA-TABS) tablet 100 mg  100 mg Oral Q12H    sodium chloride (NS) flush 5-40 mL  5-40 mL IntraVENous Q8H    sodium chloride (NS) flush 5-40 mL  5-40 mL IntraVENous Q8H    aspirin chewable tablet 81 mg  81 mg Oral DAILY    clopidogreL (PLAVIX) tablet 75 mg  75 mg Oral DAILY    atorvastatin (LIPITOR) tablet 80 mg  80 mg Oral QHS    enoxaparin (LOVENOX) injection 60 mg  1 mg/kg SubCUTAneous Q12H    insulin lispro (HUMALOG) injection   SubCUTAneous Q6H    [Held by provider] levothyroxine (SYNTHROID) tablet 125 mcg  125 mcg Oral 6am       Review of Systems:  A comprehensive review of systems was negative except for that written in the HPI. Objective:   Vital Signs:    Visit Vitals  /72 (BP 1 Location: Left upper arm, BP Patient Position: At rest)   Pulse 94   Temp 97.6 °F (36.4 °C)   Resp 18   Ht 5' (1.524 m)   Wt 61.8 kg (136 lb 3.2 oz)   SpO2 93%   BMI 26.60 kg/m²       O2 Device: Nasal cannula   O2 Flow Rate (L/min): 2 l/min   Temp (24hrs), Av.1 °F (36.7 °C), Min:97.6 °F (36.4 °C), Max:98.7 °F (37.1 °C)       Intake/Output:   Last shift:      701 - 1900  In: -   Out: 200 [Urine:200]  Last 3 shifts:  190 -  0700  In: 1400 [P.O.:900; I.V.:500]  Out: 1100 [Urine:1100]    Intake/Output Summary (Last 24 hours) at 2021 1003  Last data filed at 2021 0808  Gross per 24 hour   Intake 1280 ml   Output 1300 ml   Net -20 ml      Physical Exam:   General:  Alert, cooperative, no distress, appears stated age. sitting in chair    Head:  Normocephalic, without obvious abnormality, atraumatic. Eyes:  Conjunctivae/corneas clear. PERRL, EOMs intact. Nose: Nares normal. Septum midline. . No drainage, gruff voice    Throat: Lips, mucosa, and tongue normal. Teeth and gums normal.M4   Neck: Supple, symmetrical, trachea midline, no adenopathy, thyroid: no enlargment/tenderness/nodules, no JVD. Back:   Symmetric, no curvature.  ROM normal.   Lungs:    bilateral wheezing, rhonchi, no rales , moist cough    Chest wall:  No tenderness or deformity. Heart:  Regular rate and rhythm, S1, S2 normal, no murmur, click, rub or gallop. Abdomen:   Soft, non-tender.  Bowel sounds normal.    Extremities: Extremities normal, atraumatic, no cyanosis - trace pretibial edema    Pulses:    Skin: Skin color, texture, turgor normal. No rashes or lesions   Lymph nodes: Cervical, supraclavicular,  nodes normal.   Neurologic: Grossly nonfocal     Data review:     Recent Results (from the past 24 hour(s))   EKG, 12 LEAD, INITIAL    Collection Time: 08/05/21 10:15 AM   Result Value Ref Range    Ventricular Rate 102 BPM    Atrial Rate 102 BPM    P-R Interval 136 ms    QRS Duration 78 ms    Q-T Interval 322 ms    QTC Calculation (Bezet) 419 ms    Calculated P Axis 62 degrees    Calculated R Axis 33 degrees    Calculated T Axis 87 degrees    Diagnosis       Sinus tachycardia with frequent premature ventricular complexes and fusion   complexes  Biatrial enlargement  Abnormal ECG  When compared with ECG of 04-AUG-2021 18:31,  No significant change was found  Confirmed by Cathy Delacruz MD, MICHELLE ([id number]) on 8/5/2021 1:56:28 PM     GLUCOSE, POC    Collection Time: 08/05/21 12:10 PM   Result Value Ref Range    Glucose (POC) 113 65 - 117 mg/dL    Performed by Prince Gomez    GLUCOSE, POC    Collection Time: 08/05/21  5:13 PM   Result Value Ref Range    Glucose (POC) 200 (H) 65 - 117 mg/dL    Performed by Charissa Arias    GLUCOSE, POC    Collection Time: 08/05/21 11:43 PM   Result Value Ref Range    Glucose (POC) 163 (H) 65 - 117 mg/dL    Performed by Frankie Nicholas (PCT)    METABOLIC PANEL, BASIC    Collection Time: 08/06/21  1:21 AM   Result Value Ref Range    Sodium 142 136 - 145 mmol/L    Potassium 4.4 3.5 - 5.1 mmol/L    Chloride 108 97 - 108 mmol/L    CO2 30 21 - 32 mmol/L    Anion gap 4 (L) 5 - 15 mmol/L    Glucose 167 (H) 65 - 100 mg/dL    BUN 22 (H) 6 - 20 MG/DL    Creatinine 0.64 0.55 - 1.02 MG/DL    BUN/Creatinine ratio 34 (H) 12 - 20 GFR est AA >60 >60 ml/min/1.73m2    GFR est non-AA >60 >60 ml/min/1.73m2    Calcium 8.1 (L) 8.5 - 10.1 MG/DL   CBC WITH AUTOMATED DIFF    Collection Time: 08/06/21  1:21 AM   Result Value Ref Range    WBC 10.5 3.6 - 11.0 K/uL    RBC 4.10 3.80 - 5.20 M/uL    HGB 12.9 11.5 - 16.0 g/dL    HCT 40.8 35.0 - 47.0 %    MCV 99.5 (H) 80.0 - 99.0 FL    MCH 31.5 26.0 - 34.0 PG    MCHC 31.6 30.0 - 36.5 g/dL    RDW 13.9 11.5 - 14.5 %    PLATELET 332 413 - 315 K/uL    MPV 9.4 8.9 - 12.9 FL    NRBC 0.0 0  WBC    ABSOLUTE NRBC 0.00 0.00 - 0.01 K/uL    NEUTROPHILS 87 (H) 32 - 75 %    LYMPHOCYTES 9 (L) 12 - 49 %    MONOCYTES 3 (L) 5 - 13 %    EOSINOPHILS 0 0 - 7 %    BASOPHILS 0 0 - 1 %    IMMATURE GRANULOCYTES 1 (H) 0.0 - 0.5 %    ABS. NEUTROPHILS 9.1 (H) 1.8 - 8.0 K/UL    ABS. LYMPHOCYTES 1.0 0.8 - 3.5 K/UL    ABS. MONOCYTES 0.3 0.0 - 1.0 K/UL    ABS. EOSINOPHILS 0.0 0.0 - 0.4 K/UL    ABS. BASOPHILS 0.0 0.0 - 0.1 K/UL    ABS. IMM.  GRANS. 0.1 (H) 0.00 - 0.04 K/UL    DF AUTOMATED     MAGNESIUM    Collection Time: 08/06/21  1:21 AM   Result Value Ref Range    Magnesium 2.1 1.6 - 2.4 mg/dL   GLUCOSE, POC    Collection Time: 08/06/21  6:09 AM   Result Value Ref Range    Glucose (POC) 147 (H) 65 - 117 mg/dL    Performed by Dex Low (PCT)        Imaging:  I have personally reviewed the patients radiographs and have reviewed the reports:  cxr - ap - nad         Barrington Garcia MD

## 2021-08-06 NOTE — PROGRESS NOTES
CARDIOLOGY PROGRESS NOTE        [address], [place], [address] [facility]  Phone [phone]; Fax [phone]          2021 7:54 AM       Admit Date:           2021  Admit Diagnosis:  NSTEMI (non-ST elevated myocardial infarction) [facility]) [I21.4]  Chest pain [R07.9]  Elevated brain natriuretic peptide (BNP) level [R79.89]  Dyspnea [R06.00]  :          1960   MRN:          [id number]        Assessment/Plan  1. Non-ST elevation MI: Moderate mid into distal LAD disease, Abnormal coronary circulation with no true Lcx, trifurcating diagonal . Cont GDMT for NSTEMI (asa,plavix, statin). Resume BB as no further hypotension or bradycardia, actually had SVT with nebs this am) . Add imdur. Consider iFR guided revascularization of LAD if she continue to have chest pain symptoms. 2. Elevated proBNP:   3. Dyslipidemia:   4. Diabetes  5. Hypertension:  6. COPD: pulmonary to follow   7. History of PFO closure 2020 off Plavix perform secondary to a complex fossa ovalis defect/ASD  8. Complex ventricular ectopy followed by Dr. Maegan Mancia       Will sign off and see as OP              We discussed the expected course, resolution and complications of the diagnosis(es) in detail. Medication risks, benefits, costs, interactions, and alternatives were discussed as indicated. No intake/output data recorded. Last 3 Recorded Weights in this Encounter    21 0159 21 0744 21 0328   Weight: 60.8 kg (134 lb) 60.8 kg (134 lb 0.6 oz) 61.8 kg (136 lb 3.2 oz)          1901 -  0700  In: 1400 [P.O.:900; I.V.:500]  Out: 1100 [Urine:1100]    SUBJECTIVE      Admitted for SOB/chest pain. Ruled in for NSTEMI. Sylvia Montes reports coughing, wheezing .  No chest pains       Current Facility-Administered Medications   Medication Dose Route Frequency    montelukast (SINGULAIR) tablet 10 mg  10 mg Oral QPM    PARoxetine (PAXIL) tablet 20 mg  20 mg Oral DAILY    naloxone (NARCAN) injection 0.4 mg  0.4 mg IntraVENous EVERY 2 MINUTES AS NEEDED    budesonide (PULMICORT) 500 mcg/2 ml nebulizer suspension  1,000 mcg Nebulization BID RT    methylPREDNISolone (PF) (SOLU-MEDROL) injection 60 mg  60 mg IntraVENous Q8H    guaiFENesin ER (MUCINEX) tablet 600 mg  600 mg Oral Q12H    doxycycline (VIBRA-TABS) tablet 100 mg  100 mg Oral Q12H    sodium chloride (NS) flush 5-40 mL  5-40 mL IntraVENous Q8H    sodium chloride (NS) flush 5-40 mL  5-40 mL IntraVENous PRN    sodium chloride (NS) flush 5-40 mL  5-40 mL IntraVENous Q8H    sodium chloride (NS) flush 5-40 mL  5-40 mL IntraVENous PRN    aspirin chewable tablet 81 mg  81 mg Oral DAILY    clopidogreL (PLAVIX) tablet 75 mg  75 mg Oral DAILY    atorvastatin (LIPITOR) tablet 80 mg  80 mg Oral QHS    acetaminophen (TYLENOL) tablet 650 mg  650 mg Oral Q4H PRN    oxyCODONE IR (ROXICODONE) tablet 5 mg  5 mg Oral Q4H PRN    LORazepam (ATIVAN) injection 1 mg  1 mg IntraVENous Q6H PRN    enoxaparin (LOVENOX) injection 60 mg  1 mg/kg SubCUTAneous Q12H    melatonin (rapid dissolve) tablet 5 mg  5 mg Oral QHS PRN    albuterol-ipratropium (DUO-NEB) 2.5 MG-0.5 MG/3 ML  3 mL Nebulization QID RT    albuterol (PROVENTIL VENTOLIN) nebulizer solution 2.5 mg  2.5 mg Nebulization Q4H PRN    glucose chewable tablet 16 g  4 Tablet Oral PRN    dextrose (D50W) injection syrg 12.5-25 g  25-50 mL IntraVENous PRN    glucagon (GLUCAGEN) injection 1 mg  1 mg IntraMUSCular PRN    insulin lispro (HUMALOG) injection   SubCUTAneous Q6H    [Held by provider] levothyroxine (SYNTHROID) tablet 125 mcg  125 mcg Oral 6am    morphine injection 2 mg  2 mg IntraVENous Q3H PRN     Facility-Administered Medications Ordered in Other Encounters   Medication Dose Route Frequency    sodium chloride (NS) flush 10 mL  10 mL IntraVENous PRN      OBJECTIVE               Intake/Output Summary (Last 24 hours) at 8/6/2021 7178  Last data filed at 8/6/2021 0328  Gross per 24 hour   Intake 1400 ml   Output 1100 ml   Net 300 ml       Review of Systems - History obtained from the patient AS PER  HPI        PHYSICAL EXAM        Visit Vitals  /71 (BP 1 Location: Left upper arm, BP Patient Position: At rest)   Pulse 99   Temp 98.3 °F (36.8 °C)   Resp 14   Ht 5' (1.524 m)   Wt 61.8 kg (136 lb 3.2 oz)   SpO2 95%   BMI 26.60 kg/m²       Gen: Well-developed, well-nourished, in no acute distress  alert and oriented x 3  HEENT:  Pink conjunctivae, Hearing grossly normal.  No scleral icterus or conjunctival, moist mucous membranes  Neck: Supple,  No JVD, No Carotid Bruit  Resp: No accessory muscle use, exp wheezing throughout   Card: Regular Rate,  Rythm,Normal S1, S2, No murmurs  MSK: No cyanosis or clubbing, good capillary refill  Skin: No rashes or ulcers, no bruising  Neuro:  Moving all four extremities, no focal deficit, follows commands appropriately  Psych:  Good insight, oriented to person, place and time, alert, Nml Affect  LE: No edema       DATA REVIEW      1. Neck CTA 4/2020   - moderate bilateral ICA disease (left 60%)     2. Echo   4/3/20 - EF 60%, PFO significant right to left shunt   8/31/20 - EF 60-65%. Normal cavity size and wall thickness. No WMA. 30 mm Byron Cardioform device closure device present in expected position without residual shunting. 3. Venous duplex    4/4/20 - no DVT     4. Event recorder    April 2020 - frequent PVCs, multifocal, short runs NSVT     5. Stress Test   Lexiscan cardiolite 5/1/20 - normal perfusion, EF 61%     6. Lipids   4/5/20- , HDL 43, .8,    6/23/21-, HDL 55, LDL 85, TG 88    7. PFO Closure -6/5/20   NO pulmonary HTN   Small sized Fossa ovalis defect/ASD with previously document bidirectional shunt and stroke   ICE guided closure of the ASD with 30 mm Byron cardioform device.     Cardiac monitor:SR         Laboratory and Imaging have been reviewed by me and are notable for  Recent Labs 08/05/21  1000 08/05/21  0003 08/04/21  1712   TROIQ 1.52* 2.13* 2.31*     Recent Labs     08/06/21  0121 08/05/21  0620 08/04/21  1712    136 139   K 4.4 5.2* 4.6   CO2 30 26 29   BUN 22* 26* 16   CREA 0.64 1.06* 0.82   * 146* 175*   MG 2.1  --   --    WBC 10.5  --  10.5   HGB 12.9  --  14.8   HCT 40.8  --  43.8     --  [address] [facility], NP

## 2021-08-06 NOTE — PROGRESS NOTES
2100: Dr. Nilda Meléndez paged regarding pt tachycardia as noted below    \"Pt running sinus tach 110-122. All other VSS, no pain, no fever. Had cardiac cath earlier today, but it does not appear tachycardia is new but it's a slightly more tachcardic than this morning. Pt is also getting IV steroids. Just wanted to ask what parameters you want for notification purposes and see if you want to treat. Thanks! \"

## 2021-08-06 NOTE — PROGRESS NOTES
Problem: Falls - Risk of  Goal: *Absence of Falls  Description: Document Marlen Watters Fall Risk and appropriate interventions in the flowsheet.   Outcome: Progressing Towards Goal  Note: Fall Risk Interventions:            Medication Interventions: Bed/chair exit alarm, Evaluate medications/consider consulting pharmacy, Patient to call before getting OOB, Teach patient to arise slowly                   Problem: Patient Education: Go to Patient Education Activity  Goal: Patient/Family Education  Outcome: Progressing Towards Goal

## 2021-08-07 ENCOUNTER — APPOINTMENT (OUTPATIENT)
Dept: GENERAL RADIOLOGY | Age: 61
DRG: 190 | End: 2021-08-07
Attending: INTERNAL MEDICINE
Payer: MEDICAID

## 2021-08-07 LAB
ALBUMIN SERPL-MCNC: 3.1 G/DL (ref 3.5–5)
ANION GAP SERPL CALC-SCNC: 0 MMOL/L (ref 5–15)
BASOPHILS # BLD: 0 K/UL (ref 0–0.1)
BASOPHILS NFR BLD: 0 % (ref 0–1)
BUN SERPL-MCNC: 13 MG/DL (ref 6–20)
BUN/CREAT SERPL: 22 (ref 12–20)
CALCIUM SERPL-MCNC: 8.5 MG/DL (ref 8.5–10.1)
CHLORIDE SERPL-SCNC: 110 MMOL/L (ref 97–108)
CO2 SERPL-SCNC: 28 MMOL/L (ref 21–32)
CREAT SERPL-MCNC: 0.59 MG/DL (ref 0.55–1.02)
DIFFERENTIAL METHOD BLD: ABNORMAL
EOSINOPHIL # BLD: 0 K/UL (ref 0–0.4)
EOSINOPHIL NFR BLD: 0 % (ref 0–7)
ERYTHROCYTE [DISTWIDTH] IN BLOOD BY AUTOMATED COUNT: 13.4 % (ref 11.5–14.5)
GLUCOSE BLD STRIP.AUTO-MCNC: 155 MG/DL (ref 65–117)
GLUCOSE BLD STRIP.AUTO-MCNC: 184 MG/DL (ref 65–117)
GLUCOSE BLD STRIP.AUTO-MCNC: 185 MG/DL (ref 65–117)
GLUCOSE BLD STRIP.AUTO-MCNC: 217 MG/DL (ref 65–117)
GLUCOSE SERPL-MCNC: 152 MG/DL (ref 65–100)
HCT VFR BLD AUTO: 42.8 % (ref 35–47)
HGB BLD-MCNC: 13.7 G/DL (ref 11.5–16)
IMM GRANULOCYTES # BLD AUTO: 0.2 K/UL (ref 0–0.04)
IMM GRANULOCYTES NFR BLD AUTO: 1 % (ref 0–0.5)
LYMPHOCYTES # BLD: 1.5 K/UL (ref 0.8–3.5)
LYMPHOCYTES NFR BLD: 10 % (ref 12–49)
MAGNESIUM SERPL-MCNC: 2.3 MG/DL (ref 1.6–2.4)
MCH RBC QN AUTO: 31.4 PG (ref 26–34)
MCHC RBC AUTO-ENTMCNC: 32 G/DL (ref 30–36.5)
MCV RBC AUTO: 98.2 FL (ref 80–99)
MONOCYTES # BLD: 0.5 K/UL (ref 0–1)
MONOCYTES NFR BLD: 4 % (ref 5–13)
NEUTS SEG # BLD: 12 K/UL (ref 1.8–8)
NEUTS SEG NFR BLD: 85 % (ref 32–75)
NRBC # BLD: 0 K/UL (ref 0–0.01)
NRBC BLD-RTO: 0 PER 100 WBC
PHOSPHATE SERPL-MCNC: 2.9 MG/DL (ref 2.6–4.7)
PLATELET # BLD AUTO: 261 K/UL (ref 150–400)
PMV BLD AUTO: 9.3 FL (ref 8.9–12.9)
POTASSIUM SERPL-SCNC: 4.4 MMOL/L (ref 3.5–5.1)
RBC # BLD AUTO: 4.36 M/UL (ref 3.8–5.2)
SERVICE CMNT-IMP: ABNORMAL
SODIUM SERPL-SCNC: 138 MMOL/L (ref 136–145)
WBC # BLD AUTO: 14.3 K/UL (ref 3.6–11)

## 2021-08-07 PROCEDURE — 74011000250 HC RX REV CODE- 250: Performed by: HOSPITALIST

## 2021-08-07 PROCEDURE — 94760 N-INVAS EAR/PLS OXIMETRY 1: CPT

## 2021-08-07 PROCEDURE — 74011250636 HC RX REV CODE- 250/636: Performed by: HOSPITALIST

## 2021-08-07 PROCEDURE — 83735 ASSAY OF MAGNESIUM: CPT

## 2021-08-07 PROCEDURE — 74011636637 HC RX REV CODE- 636/637: Performed by: INTERNAL MEDICINE

## 2021-08-07 PROCEDURE — 74011250636 HC RX REV CODE- 250/636: Performed by: INTERNAL MEDICINE

## 2021-08-07 PROCEDURE — 80069 RENAL FUNCTION PANEL: CPT

## 2021-08-07 PROCEDURE — 82962 GLUCOSE BLOOD TEST: CPT

## 2021-08-07 PROCEDURE — 36415 COLL VENOUS BLD VENIPUNCTURE: CPT

## 2021-08-07 PROCEDURE — 65660000000 HC RM CCU STEPDOWN

## 2021-08-07 PROCEDURE — 94664 DEMO&/EVAL PT USE INHALER: CPT

## 2021-08-07 PROCEDURE — 74011250637 HC RX REV CODE- 250/637: Performed by: HOSPITALIST

## 2021-08-07 PROCEDURE — 85025 COMPLETE CBC W/AUTO DIFF WBC: CPT

## 2021-08-07 PROCEDURE — 74011000250 HC RX REV CODE- 250: Performed by: INTERNAL MEDICINE

## 2021-08-07 PROCEDURE — 77010033678 HC OXYGEN DAILY

## 2021-08-07 PROCEDURE — 74011250637 HC RX REV CODE- 250/637: Performed by: NURSE PRACTITIONER

## 2021-08-07 PROCEDURE — 74011250637 HC RX REV CODE- 250/637: Performed by: INTERNAL MEDICINE

## 2021-08-07 PROCEDURE — 71045 X-RAY EXAM CHEST 1 VIEW: CPT

## 2021-08-07 PROCEDURE — 94640 AIRWAY INHALATION TREATMENT: CPT

## 2021-08-07 RX ADMIN — METHYLPREDNISOLONE SODIUM SUCCINATE 60 MG: 125 INJECTION, POWDER, FOR SOLUTION INTRAMUSCULAR; INTRAVENOUS at 21:17

## 2021-08-07 RX ADMIN — FAMOTIDINE 20 MG: 20 TABLET ORAL at 09:17

## 2021-08-07 RX ADMIN — METOPROLOL SUCCINATE 25 MG: 25 TABLET, EXTENDED RELEASE ORAL at 09:18

## 2021-08-07 RX ADMIN — Medication 10 ML: at 21:25

## 2021-08-07 RX ADMIN — CLOPIDOGREL BISULFATE 75 MG: 75 TABLET, FILM COATED ORAL at 09:17

## 2021-08-07 RX ADMIN — BUDESONIDE 1000 MCG: 0.5 SUSPENSION RESPIRATORY (INHALATION) at 08:03

## 2021-08-07 RX ADMIN — METHYLPREDNISOLONE SODIUM SUCCINATE 60 MG: 125 INJECTION, POWDER, FOR SOLUTION INTRAMUSCULAR; INTRAVENOUS at 18:24

## 2021-08-07 RX ADMIN — Medication 10 ML: at 05:30

## 2021-08-07 RX ADMIN — INSULIN LISPRO 1 UNITS: 100 INJECTION, SOLUTION INTRAVENOUS; SUBCUTANEOUS at 21:20

## 2021-08-07 RX ADMIN — ARFORMOTEROL TARTRATE 15 MCG: 15 SOLUTION RESPIRATORY (INHALATION) at 19:16

## 2021-08-07 RX ADMIN — IPRATROPIUM BROMIDE AND ALBUTEROL SULFATE 3 ML: .5; 3 SOLUTION RESPIRATORY (INHALATION) at 01:40

## 2021-08-07 RX ADMIN — ENOXAPARIN SODIUM 60 MG: 60 INJECTION SUBCUTANEOUS at 21:20

## 2021-08-07 RX ADMIN — MONTELUKAST 10 MG: 10 TABLET, FILM COATED ORAL at 18:24

## 2021-08-07 RX ADMIN — ISOSORBIDE MONONITRATE 30 MG: 30 TABLET, EXTENDED RELEASE ORAL at 09:17

## 2021-08-07 RX ADMIN — DOXYCYCLINE HYCLATE 100 MG: 100 TABLET, COATED ORAL at 21:21

## 2021-08-07 RX ADMIN — ENOXAPARIN SODIUM 60 MG: 60 INJECTION SUBCUTANEOUS at 09:17

## 2021-08-07 RX ADMIN — GUAIFENESIN 600 MG: 600 TABLET ORAL at 21:20

## 2021-08-07 RX ADMIN — GUAIFENESIN 600 MG: 600 TABLET ORAL at 09:17

## 2021-08-07 RX ADMIN — IPRATROPIUM BROMIDE AND ALBUTEROL SULFATE 3 ML: .5; 3 SOLUTION RESPIRATORY (INHALATION) at 07:58

## 2021-08-07 RX ADMIN — ARFORMOTEROL TARTRATE 15 MCG: 15 SOLUTION RESPIRATORY (INHALATION) at 08:03

## 2021-08-07 RX ADMIN — DOXYCYCLINE HYCLATE 100 MG: 100 TABLET, COATED ORAL at 09:17

## 2021-08-07 RX ADMIN — Medication 10 ML: at 15:30

## 2021-08-07 RX ADMIN — PAROXETINE HYDROCHLORIDE 20 MG: 20 TABLET, FILM COATED ORAL at 09:17

## 2021-08-07 RX ADMIN — Medication 5 ML: at 06:00

## 2021-08-07 RX ADMIN — ALBUTEROL SULFATE 2.5 MG: 2.5 SOLUTION RESPIRATORY (INHALATION) at 05:37

## 2021-08-07 RX ADMIN — BUDESONIDE 1000 MCG: 0.5 SUSPENSION RESPIRATORY (INHALATION) at 19:16

## 2021-08-07 RX ADMIN — ATORVASTATIN CALCIUM 80 MG: 20 TABLET, FILM COATED ORAL at 21:21

## 2021-08-07 RX ADMIN — Medication 10 ML: at 22:00

## 2021-08-07 RX ADMIN — IPRATROPIUM BROMIDE AND ALBUTEROL SULFATE 3 ML: .5; 3 SOLUTION RESPIRATORY (INHALATION) at 19:14

## 2021-08-07 RX ADMIN — ASPIRIN 81 MG: 81 TABLET, CHEWABLE ORAL at 09:17

## 2021-08-07 RX ADMIN — METHYLPREDNISOLONE SODIUM SUCCINATE 60 MG: 125 INJECTION, POWDER, FOR SOLUTION INTRAMUSCULAR; INTRAVENOUS at 05:28

## 2021-08-07 RX ADMIN — IPRATROPIUM BROMIDE AND ALBUTEROL SULFATE 3 ML: .5; 3 SOLUTION RESPIRATORY (INHALATION) at 14:23

## 2021-08-07 NOTE — PROGRESS NOTES
Bedside and Verbal shift change report given to Trang Serna (oncoming nurse) by Agusto Vaz RN (offgoing nurse). Report included the following information SBAR, Kardex and Cardiac Rhythm NSR/Ventricular Bigmeny. This patient was assisted with Intentional Toileting every 2 hours during this shift as appropriate. Documentation of ambulation and output reflected on Flowsheet as appropriate. Purposeful hourly rounding was completed using AIDET and 5Ps. Outcomes of PHR documented as they occurred. Bed alarm in use as appropriate. Dual Suction and ambubag in place. Bedside and Verbal shift change report given to [address] [place] [address] (oncoming nurse) by Trang Serna (offgoing nurse). Report included the following information SBAR, Kardex and Cardiac Rhythm NSR/Ventricular Bigimeny.

## 2021-08-07 NOTE — PROGRESS NOTES
[facility]  Pulmonary, Critical Care, and Sleep Medicine    Initial Patient Consult    Name: Marcello Hunt MRN: [id number]   : 1960 Hospital: [facility][1]   Date: 2021        IMPRESSION:   · Copd exacerbation - has a ways to go !!  ·  active tobacco use - most recently 1/2 ppd per her report   · kathy eval in progress by pulm in [place]   · pulm nodules on ct screen done in [place] -  To be fu with repeat imaging in 2021  · Admitted with cp/ nstemi - hx of cad- s/p cath   · covid vaccinated - covid test neg   · Acute hypoxic resp failure - sats 94 % on 1.5 liters nc  ( down from 3 liters)        RECOMMENDATIONS:   · Continue scheduled nebs  · Brovana/Pulmicort  · Solumedrol: wean slightly to 60mg q12h  · Currently on therapeutic lovenox as per cards   · KATHY eval outpt eval  · Repeat ct imaging  As planned by her pulm  In 2021- unless  Needed in planning for her cardiac mgt  · Mucinex  · Doxy for 5 days  · bsl mgt on steroids   · Nicotine patch might  Be needed but would check with cards first to see if they are ok with this if she asks for it. · Pt wants to transfer her care to [facility]  So par and her cardiologist can coordinate care. Will arrange par fu post dc. Subjective: This patient has been seen and evaluated at the request of    For copd exacerbation . Patient is a 62 y.o. female  With hx of copd, active tobacco use , cad, pfo closure, cva, dm, htn, hld . She  Was sent from her cardiologist office yesterday  To the er with copd exacerbation . She had cp yesterday and was noted to have elevated troponin and bnp. Cxr was without acute dx. Pt and son report that she wheeze every day but has gotten worse over the past week. She denies f,c,cp , pleurisy . She reported had nausea with the cp yesterday but today is free of cp or  Gi s/s . She noted increased lower ext edema recently .        She smokes 1/2 ppd down from 2 ppd.  She uses neb ( duoneb )  Prn and admits to overuse of  Her albuterol hfa. ( NO cp with the albuterol.) She was on symbicort but says it was dc and her pulm was trying to find another med that would be covered by her insurance. She is to undergo testing for kathy. She has recent ct chest screen that detected 2 pulm nodules  That per son were thought to be possibly inflammatory as she has pulm congestion when it was done - to be repeated in nov. Her pulm   Is in [address] 8/6- chest is sore from coughing . Still wheezing. Was last hospitalized a month ago in [place] for 4 days per her report with copd exacerbation. 8/7: Afebrile. BP stable. Sats 96% on 2L NC. WBC 14.3; increased today. Past Medical History:   Diagnosis Date    Chronic obstructive pulmonary disease ([address]     Diabetes ([facility]     Borderline    Hypercholesteremia     Hypertension     Stroke ([facility]     Thyroid disease     cva  pfo  Past Surgical History:   Procedure Laterality Date    HX APPENDECTOMY      HX CHOLECYSTECTOMY      HX GYN      hysterectomy    [place] CARDIAC SURG PROCEDURE UNLIST      cath    oophorectomy   pfo closure   Prior to Admission medications    Medication Sig Start Date End Date Taking? Authorizing Provider   acetaminophen (TYLENOL) 325 mg tablet Take 650 mg by mouth every six (6) hours as needed for Pain (Mild to moderate pain). Yes Provider, Historical   montelukast (SINGULAIR) 10 mg tablet Take 10 mg by mouth every evening. Yes Provider, Historical   metoprolol succinate (Toprol XL) 25 mg XL tablet Take 25 mg by mouth daily. Yes Provider, Historical   levothyroxine (SYNTHROID) 125 mcg tablet Take 125 mcg by mouth Daily (before breakfast). Yes Provider, Historical   traMADoL (ULTRAM) 50 mg tablet Take 50 mg by mouth every eight (8) hours as needed for Pain (Severe pain).  7/9/20  Yes Provider, Historical   albuterol-ipratropium (DUO-NEB) 2.5 mg-0.5 mg/3 ml nebu 3 mL by Nebulization route every four (4) hours as needed for Wheezing, Shortness of Breath or Respiratory Distress. 7/9/20  Yes Provider, Historical   atorvastatin (LIPITOR) 80 mg tablet Take 1 Tab by mouth nightly. 6/26/20  Yes Annika Hannon MD   ARIPiprazole (Abilify) 10 mg tablet Take 10 mg by mouth nightly. 5/11/20  Yes Provider, Historical   aspirin 81 mg chewable tablet Take 81 mg by mouth daily. Yes Provider, Historical   PARoxetine (PaxiL) 20 mg tablet Take 20 mg by mouth daily. Yes Provider, Historical   budesonide-formoteroL (Symbicort) 160-4.5 mcg/actuation HFAA Take 2 Puffs by inhalation two (2) times a day. Yes Provider, Historical   albuterol (PROVENTIL HFA, VENTOLIN HFA, PROAIR HFA) 90 mcg/actuation inhaler Take 2 Puffs by inhalation every six (6) hours as needed for Wheezing.    Yes Provider, Historical     No Known Allergies   Social History     Tobacco Use    Smoking status: Current Every Day Smoker     Packs/day: 1.00    Smokeless tobacco: Never Used   Substance Use Topics    Alcohol use: Never       works in retail    no drugs, thc, etoh     Family History   Problem Relation Age of Onset    Stroke Neg Hx     lung dz in biological father  Cancer - adopted so does not know details    Current Facility-Administered Medications   Medication Dose Route Frequency    metoprolol succinate (TOPROL-XL) XL tablet 25 mg  25 mg Oral DAILY    arformoteroL (BROVANA) neb solution 15 mcg  15 mcg Nebulization BID RT    albuterol-ipratropium (DUO-NEB) 2.5 MG-0.5 MG/3 ML  3 mL Nebulization Q6H RT    isosorbide mononitrate ER (IMDUR) tablet 30 mg  30 mg Oral DAILY    famotidine (PEPCID) tablet 20 mg  20 mg Oral DAILY    insulin lispro (HUMALOG) injection   SubCUTAneous AC&HS    montelukast (SINGULAIR) tablet 10 mg  10 mg Oral QPM    PARoxetine (PAXIL) tablet 20 mg  20 mg Oral DAILY    budesonide (PULMICORT) 500 mcg/2 ml nebulizer suspension  1,000 mcg Nebulization BID RT    methylPREDNISolone (PF) (SOLU-MEDROL) injection 60 mg  60 mg IntraVENous Q8H    guaiFENesin ER (MUCINEX) tablet 600 mg  600 mg Oral Q12H    doxycycline (VIBRA-TABS) tablet 100 mg  100 mg Oral Q12H    sodium chloride (NS) flush 5-40 mL  5-40 mL IntraVENous Q8H    sodium chloride (NS) flush 5-40 mL  5-40 mL IntraVENous Q8H    aspirin chewable tablet 81 mg  81 mg Oral DAILY    clopidogreL (PLAVIX) tablet 75 mg  75 mg Oral DAILY    atorvastatin (LIPITOR) tablet 80 mg  80 mg Oral QHS    enoxaparin (LOVENOX) injection 60 mg  1 mg/kg SubCUTAneous Q12H    [Held by provider] levothyroxine (SYNTHROID) tablet 125 mcg  125 mcg Oral 6am       Review of Systems:  A comprehensive review of systems was negative except for that written in the HPI. Objective:   Vital Signs:    Visit Vitals  /79 (BP 1 Location: Left arm, BP Patient Position: At rest)   Pulse 76   Temp 98 °F (36.7 °C)   Resp 15   Ht 5' (1.524 m)   Wt 61.8 kg (136 lb 3.2 oz)   SpO2 96%   BMI 26.60 kg/m²       O2 Device: Nasal cannula   O2 Flow Rate (L/min): 2 l/min   Temp (24hrs), Av.7 °F (36.5 °C), Min:97.3 °F (36.3 °C), Max:98 °F (36.7 °C)       Intake/Output:   Last shift:      No intake/output data recorded. Last 3 shifts:  1901 -  0700  In: 1696 [P.O.:1350; I.V.:500]  Out: 2600 [Urine:2600]    Intake/Output Summary (Last 24 hours) at 2021 0942  Last data filed at 2021 0125  Gross per 24 hour   Intake 750 ml   Output 1800 ml   Net -1050 ml      Physical Exam:   General:  Alert, cooperative, no distress, appears stated age. sitting in chair    Head:  Normocephalic, without obvious abnormality, atraumatic. Eyes:  Conjunctivae/corneas clear. PERRL, EOMs intact. Nose: Nares normal. Septum midline. . No drainage, gruff voice    Throat: Lips, mucosa, and tongue normal. Teeth and gums normal.M4   Neck: Supple, symmetrical, trachea midline, no adenopathy, thyroid: no enlargment/tenderness/nodules, no JVD. Back:   Symmetric, no curvature.  ROM normal.   Lungs: Bilateral wheezing with prolonged expiratory phase, rhonchi, no rales , moist cough    Chest wall:  No tenderness or deformity. Heart:  Regular rate and rhythm, S1, S2 normal, no murmur, click, rub or gallop. Abdomen:   Soft, non-tender. Bowel sounds normal.    Extremities: Extremities normal, atraumatic, no cyanosis - trace pretibial edema    Pulses: 2+   Skin: Skin color, texture, turgor normal. No rashes or lesions   Lymph nodes: Cervical, supraclavicular,  nodes normal.   Neurologic: Grossly nonfocal     Data review:     Recent Results (from the past 24 hour(s))   GLUCOSE, POC    Collection Time: 08/06/21 11:48 AM   Result Value Ref Range    Glucose (POC) 157 (H) 65 - 117 mg/dL    Performed by [place] [address], POC    Collection Time: 08/06/21  4:10 PM   Result Value Ref Range    Glucose (POC) 117 65 - 117 mg/dL    Performed by [place], POC    Collection Time: 08/06/21  8:43 PM   Result Value Ref Range    Glucose (POC) 241 (H) 65 - 117 mg/dL    Performed by Edward Caro    CBC WITH AUTOMATED DIFF    Collection Time: 08/07/21  6:00 AM   Result Value Ref Range    WBC 14.3 (H) 3.6 - 11.0 K/uL    RBC 4.36 3.80 - 5.20 M/uL    HGB 13.7 11.5 - 16.0 g/dL    HCT 42.8 35.0 - 47.0 %    MCV 98.2 80.0 - 99.0 FL    MCH 31.4 26.0 - 34.0 PG    MCHC 32.0 30.0 - 36.5 g/dL    RDW 13.4 11.5 - 14.5 %    PLATELET 938 876 - 026 K/uL    MPV 9.3 8.9 - 12.9 FL    NRBC 0.0 0  WBC    ABSOLUTE NRBC 0.00 0.00 - 0.01 K/uL    NEUTROPHILS 85 (H) 32 - 75 %    LYMPHOCYTES 10 (L) 12 - 49 %    MONOCYTES 4 (L) 5 - 13 %    EOSINOPHILS 0 0 - 7 %    BASOPHILS 0 0 - 1 %    IMMATURE GRANULOCYTES 1 (H) 0.0 - 0.5 %    ABS. NEUTROPHILS 12.0 (H) 1.8 - 8.0 K/UL    ABS. LYMPHOCYTES 1.5 0.8 - 3.5 K/UL    ABS. MONOCYTES 0.5 0.0 - 1.0 K/UL    ABS. EOSINOPHILS 0.0 0.0 - 0.4 K/UL    ABS. BASOPHILS 0.0 0.0 - 0.1 K/UL    ABS. IMM.  GRANS. 0.2 (H) 0.00 - 0.04 K/UL    DF AUTOMATED     RENAL FUNCTION PANEL    Collection Time: 08/07/21  6:00 AM   Result Value Ref Range    Sodium 138 136 - 145 mmol/L    Potassium 4.4 3.5 - 5.1 mmol/L    Chloride 110 (H) 97 - 108 mmol/L    CO2 28 21 - 32 mmol/L    Anion gap 0 (L) 5 - 15 mmol/L    Glucose 152 (H) 65 - 100 mg/dL    BUN 13 6 - 20 MG/DL    Creatinine 0.59 0.55 - 1.02 MG/DL    BUN/Creatinine ratio 22 (H) 12 - 20      GFR est AA >60 >60 ml/min/1.73m2    GFR est non-AA >60 >60 ml/min/1.73m2    Calcium 8.5 8.5 - 10.1 MG/DL    Phosphorus 2.9 2.6 - 4.7 MG/DL    Albumin 3.1 (L) 3.5 - 5.0 g/dL   MAGNESIUM    Collection Time: 08/07/21  6:00 AM   Result Value Ref Range    Magnesium 2.3 1.6 - 2.4 mg/dL   GLUCOSE, POC    Collection Time: 08/07/21  7:31 AM   Result Value Ref Range    Glucose (POC) 185 (H) 65 - 117 mg/dL    Performed by Adelia Rice (PCT)        Imaging:  I have personally reviewed the patients radiographs and have reviewed the reports:  cxr - ap - thierno Quigley NP

## 2021-08-07 NOTE — PROGRESS NOTES
[facility]  [address] [facility], [place], [address] [place] [address]  [phone]      Medical Progress Note      NAME: Marky Chaves   :  1960  MRM:  [id number]    Date/Time: 2021        Assessment / Plan:     63 yo F w/ hx of HTN, DM, PFO s/p closure, COPD, CVA presenting w/ CP, found to have NSTEMI and COPD exacerbation       NSTEMI: LHC on  showed abnormal coronary circulation with no true Lcx, trifurcating diagonal and moderate mid into distal LAD disease. Cardiology recommends GDMT for ACS, including DAPT, statin, BB. ISMN. Consider iFR guided revascularization of LAD if pt continues to have chest pain symptoms. Monitor for hypotension. Added holding parameters      COPD exacerbation: severe, diffuse exp wheezing persists. Cont duo-nebs. Will keep IV steroids at current dose today and slowly wean as tolerated. Cont Singular, doxycycline. Appreciate pulmonology following      HTN (hypertension), benign: BP controlled. BP low normal. Added holding parameter for BB and nitrate      Hypothyroidism: TSH undetectable. FT4 on upper range of normal. Synthroid on hold      DM (diabetes mellitus), type 2: not listing meds, she does not appear to have DM with A1c 6.0%. BG high on steroids so we can cont SSI in hospital for now      Depression: cont Paxil      Tobacco abuse: 1/2 ppd active smoker. Smoking cessation would be crucial for her      Total time spent: 35 minutes  Time spent in the care of this patient including reviewing records, discussing with nursing and/or other providers on the treatment team, obtaining history and examining the patient, and discussing treatment plans.                   Care Plan discussed with: Patient, Nursing Staff and >50% of time spent in counseling and coordination of care    Discussed:  Care Plan and D/C Planning    Prophylaxis:  Lovenox    Disposition:  Home w/Family         Subjective:     Chief Complaint:  Follow up chest pain    Chart/notes/labs/studies reviewed, patient examined. Does not feel well. ++Wheezing, +SOB. No CP. No fevers          Objective:       Vitals:        Last 24hrs VS reviewed since prior progress note. Most recent are:    Visit Vitals  BP (!) 85/51 (BP 1 Location: Left arm, BP Patient Position: At rest)   Pulse 90   Temp 98 °F (36.7 °C)   Resp 16   Ht 5' (1.524 m)   Wt 61.8 kg (136 lb 3.2 oz)   SpO2 93%   BMI 26.60 kg/m²     SpO2 Readings from Last 6 Encounters:   08/07/21 93%   06/24/21 94%   07/28/20 96%   06/06/20 97%   05/21/20 97%   04/05/20 97%    O2 Flow Rate (L/min): 2 l/min       Intake/Output Summary (Last 24 hours) at 8/7/2021 1748  Last data filed at 8/7/2021 0125  Gross per 24 hour   Intake 510 ml   Output 1100 ml   Net -590 ml          Exam:     Physical Exam:    Gen:  Well-developed, well-nourished, in no acute distress  HEENT:  Atraumatic, normocephalic. Sclerae nonicteric, hearing intact to voice  Neck:  Supple, without apparent masses. Resp:  No accessory muscle use, increased WOB. Diffuse scattered exp wheezing  Card: distant, RRR, without m/r/g. No LE edema  Abd:  +bowel sounds, soft, NTTP, nondistended. No HSM  Neuro: Face symmetric, speech fluent, follows commands appropriately, no focal weakness or numbness  Psych:  Alert, oriented x 3.  Fair insight     Medications Reviewed: (see below)    Lab Data Reviewed: (see below)    ______________________________________________________________________    Medications:     Current Facility-Administered Medications   Medication Dose Route Frequency    methylPREDNISolone (PF) (Solu-MEDROL) injection 60 mg  60 mg IntraVENous Q8H    metoprolol succinate (TOPROL-XL) XL tablet 25 mg  25 mg Oral DAILY    arformoteroL (BROVANA) neb solution 15 mcg  15 mcg Nebulization BID RT    albuterol-ipratropium (DUO-NEB) 2.5 MG-0.5 MG/3 ML  3 mL Nebulization Q6H RT    isosorbide mononitrate ER (IMDUR) tablet 30 mg  30 mg Oral DAILY    famotidine (PEPCID) tablet 20 mg  20 mg Oral DAILY    insulin lispro (HUMALOG) injection   SubCUTAneous AC&HS    montelukast (SINGULAIR) tablet 10 mg  10 mg Oral QPM    PARoxetine (PAXIL) tablet 20 mg  20 mg Oral DAILY    naloxone (NARCAN) injection 0.4 mg  0.4 mg IntraVENous EVERY 2 MINUTES AS NEEDED    budesonide (PULMICORT) 500 mcg/2 ml nebulizer suspension  1,000 mcg Nebulization BID RT    guaiFENesin ER (MUCINEX) tablet 600 mg  600 mg Oral Q12H    doxycycline (VIBRA-TABS) tablet 100 mg  100 mg Oral Q12H    sodium chloride (NS) flush 5-40 mL  5-40 mL IntraVENous Q8H    sodium chloride (NS) flush 5-40 mL  5-40 mL IntraVENous PRN    sodium chloride (NS) flush 5-40 mL  5-40 mL IntraVENous Q8H    sodium chloride (NS) flush 5-40 mL  5-40 mL IntraVENous PRN    aspirin chewable tablet 81 mg  81 mg Oral DAILY    clopidogreL (PLAVIX) tablet 75 mg  75 mg Oral DAILY    atorvastatin (LIPITOR) tablet 80 mg  80 mg Oral QHS    acetaminophen (TYLENOL) tablet 650 mg  650 mg Oral Q4H PRN    oxyCODONE IR (ROXICODONE) tablet 5 mg  5 mg Oral Q4H PRN    LORazepam (ATIVAN) injection 1 mg  1 mg IntraVENous Q6H PRN    enoxaparin (LOVENOX) injection 60 mg  1 mg/kg SubCUTAneous Q12H    melatonin (rapid dissolve) tablet 5 mg  5 mg Oral QHS PRN    albuterol (PROVENTIL VENTOLIN) nebulizer solution 2.5 mg  2.5 mg Nebulization Q4H PRN    glucose chewable tablet 16 g  4 Tablet Oral PRN    dextrose (D50W) injection syrg 12.5-25 g  25-50 mL IntraVENous PRN    glucagon (GLUCAGEN) injection 1 mg  1 mg IntraMUSCular PRN    [Held by provider] levothyroxine (SYNTHROID) tablet 125 mcg  125 mcg Oral 6am    morphine injection 2 mg  2 mg IntraVENous Q3H PRN     Facility-Administered Medications Ordered in Other Encounters   Medication Dose Route Frequency    sodium chloride (NS) flush 10 mL  10 mL IntraVENous PRN            Lab Review:     Recent Labs     08/07/21  0600 08/06/21  0121   WBC 14.3* 10.5   HGB 13.7 12.9   HCT 42.8 40.8    234 Recent Labs     08/07/21  0600 08/06/21  0121 08/05/21  0620    142 136   K 4.4 4.4 5.2*   * 108 107   CO2 28 30 26   * 167* 146*   BUN 13 22* 26*   CREA 0.59 0.64 1.06*   CA 8.5 8.1* 8.5   MG 2.3 2.1  --    PHOS 2.9  --   --    ALB 3.1*  --   --      No components found for: GLPOC  No results for input(s): PH, PCO2, PO2, HCO3, FIO2 in the last 72 hours. No results for input(s): INR, INREXT, INREXT in the last 72 hours.   No results found for: SDES  No results found for: CULT           ___________________________________________________    Attending Physician: Breanne Newell MD

## 2021-08-08 LAB
ALBUMIN SERPL-MCNC: 3 G/DL (ref 3.5–5)
ANION GAP SERPL CALC-SCNC: 3 MMOL/L (ref 5–15)
BASOPHILS # BLD: 0 K/UL (ref 0–0.1)
BASOPHILS NFR BLD: 0 % (ref 0–1)
BUN SERPL-MCNC: 15 MG/DL (ref 6–20)
BUN/CREAT SERPL: 25 (ref 12–20)
CALCIUM SERPL-MCNC: 8.5 MG/DL (ref 8.5–10.1)
CHLORIDE SERPL-SCNC: 106 MMOL/L (ref 97–108)
CO2 SERPL-SCNC: 32 MMOL/L (ref 21–32)
CREAT SERPL-MCNC: 0.6 MG/DL (ref 0.55–1.02)
DIFFERENTIAL METHOD BLD: ABNORMAL
EOSINOPHIL # BLD: 0 K/UL (ref 0–0.4)
EOSINOPHIL NFR BLD: 0 % (ref 0–7)
ERYTHROCYTE [DISTWIDTH] IN BLOOD BY AUTOMATED COUNT: 13.7 % (ref 11.5–14.5)
GLUCOSE BLD STRIP.AUTO-MCNC: 108 MG/DL (ref 65–117)
GLUCOSE BLD STRIP.AUTO-MCNC: 119 MG/DL (ref 65–117)
GLUCOSE BLD STRIP.AUTO-MCNC: 162 MG/DL (ref 65–117)
GLUCOSE BLD STRIP.AUTO-MCNC: 170 MG/DL (ref 65–117)
GLUCOSE SERPL-MCNC: 172 MG/DL (ref 65–100)
HCT VFR BLD AUTO: 40.1 % (ref 35–47)
HGB BLD-MCNC: 13.3 G/DL (ref 11.5–16)
IMM GRANULOCYTES # BLD AUTO: 0.2 K/UL (ref 0–0.04)
IMM GRANULOCYTES NFR BLD AUTO: 2 % (ref 0–0.5)
LYMPHOCYTES # BLD: 1.2 K/UL (ref 0.8–3.5)
LYMPHOCYTES NFR BLD: 10 % (ref 12–49)
MAGNESIUM SERPL-MCNC: 2.1 MG/DL (ref 1.6–2.4)
MCH RBC QN AUTO: 32 PG (ref 26–34)
MCHC RBC AUTO-ENTMCNC: 33.2 G/DL (ref 30–36.5)
MCV RBC AUTO: 96.6 FL (ref 80–99)
MONOCYTES # BLD: 0.4 K/UL (ref 0–1)
MONOCYTES NFR BLD: 3 % (ref 5–13)
NEUTS SEG # BLD: 10 K/UL (ref 1.8–8)
NEUTS SEG NFR BLD: 85 % (ref 32–75)
NRBC # BLD: 0 K/UL (ref 0–0.01)
NRBC BLD-RTO: 0 PER 100 WBC
PHOSPHATE SERPL-MCNC: 3.9 MG/DL (ref 2.6–4.7)
PLATELET # BLD AUTO: 252 K/UL (ref 150–400)
PMV BLD AUTO: 9.2 FL (ref 8.9–12.9)
POTASSIUM SERPL-SCNC: 4.4 MMOL/L (ref 3.5–5.1)
RBC # BLD AUTO: 4.15 M/UL (ref 3.8–5.2)
SERVICE CMNT-IMP: ABNORMAL
SERVICE CMNT-IMP: NORMAL
SODIUM SERPL-SCNC: 141 MMOL/L (ref 136–145)
WBC # BLD AUTO: 11.8 K/UL (ref 3.6–11)

## 2021-08-08 PROCEDURE — 74011000250 HC RX REV CODE- 250: Performed by: INTERNAL MEDICINE

## 2021-08-08 PROCEDURE — 74011250636 HC RX REV CODE- 250/636: Performed by: HOSPITALIST

## 2021-08-08 PROCEDURE — 83735 ASSAY OF MAGNESIUM: CPT

## 2021-08-08 PROCEDURE — 36415 COLL VENOUS BLD VENIPUNCTURE: CPT

## 2021-08-08 PROCEDURE — 74011250637 HC RX REV CODE- 250/637: Performed by: INTERNAL MEDICINE

## 2021-08-08 PROCEDURE — 94760 N-INVAS EAR/PLS OXIMETRY 1: CPT

## 2021-08-08 PROCEDURE — 80069 RENAL FUNCTION PANEL: CPT

## 2021-08-08 PROCEDURE — 65270000029 HC RM PRIVATE

## 2021-08-08 PROCEDURE — 82962 GLUCOSE BLOOD TEST: CPT

## 2021-08-08 PROCEDURE — 94664 DEMO&/EVAL PT USE INHALER: CPT

## 2021-08-08 PROCEDURE — 94640 AIRWAY INHALATION TREATMENT: CPT

## 2021-08-08 PROCEDURE — 74011250636 HC RX REV CODE- 250/636: Performed by: INTERNAL MEDICINE

## 2021-08-08 PROCEDURE — 74011250637 HC RX REV CODE- 250/637: Performed by: HOSPITALIST

## 2021-08-08 PROCEDURE — 85025 COMPLETE CBC W/AUTO DIFF WBC: CPT

## 2021-08-08 PROCEDURE — 77010033678 HC OXYGEN DAILY

## 2021-08-08 RX ADMIN — ARFORMOTEROL TARTRATE 15 MCG: 15 SOLUTION RESPIRATORY (INHALATION) at 20:15

## 2021-08-08 RX ADMIN — IPRATROPIUM BROMIDE AND ALBUTEROL SULFATE 3 ML: .5; 3 SOLUTION RESPIRATORY (INHALATION) at 02:36

## 2021-08-08 RX ADMIN — ARFORMOTEROL TARTRATE 15 MCG: 15 SOLUTION RESPIRATORY (INHALATION) at 07:25

## 2021-08-08 RX ADMIN — ENOXAPARIN SODIUM 60 MG: 60 INJECTION SUBCUTANEOUS at 21:05

## 2021-08-08 RX ADMIN — BUDESONIDE 500 MCG: 0.5 SUSPENSION RESPIRATORY (INHALATION) at 07:25

## 2021-08-08 RX ADMIN — Medication 10 ML: at 21:07

## 2021-08-08 RX ADMIN — DOXYCYCLINE HYCLATE 100 MG: 100 TABLET, COATED ORAL at 10:03

## 2021-08-08 RX ADMIN — ENOXAPARIN SODIUM 60 MG: 60 INJECTION SUBCUTANEOUS at 10:03

## 2021-08-08 RX ADMIN — Medication 10 ML: at 15:25

## 2021-08-08 RX ADMIN — GUAIFENESIN 600 MG: 600 TABLET ORAL at 10:03

## 2021-08-08 RX ADMIN — METOPROLOL SUCCINATE 25 MG: 25 TABLET, EXTENDED RELEASE ORAL at 10:03

## 2021-08-08 RX ADMIN — PAROXETINE HYDROCHLORIDE 20 MG: 20 TABLET, FILM COATED ORAL at 10:03

## 2021-08-08 RX ADMIN — METHYLPREDNISOLONE SODIUM SUCCINATE 40 MG: 40 INJECTION, POWDER, FOR SOLUTION INTRAMUSCULAR; INTRAVENOUS at 21:06

## 2021-08-08 RX ADMIN — ATORVASTATIN CALCIUM 80 MG: 20 TABLET, FILM COATED ORAL at 21:05

## 2021-08-08 RX ADMIN — Medication 10 ML: at 07:01

## 2021-08-08 RX ADMIN — FAMOTIDINE 20 MG: 20 TABLET ORAL at 10:03

## 2021-08-08 RX ADMIN — IPRATROPIUM BROMIDE AND ALBUTEROL SULFATE 3 ML: .5; 3 SOLUTION RESPIRATORY (INHALATION) at 13:42

## 2021-08-08 RX ADMIN — Medication 10 ML: at 15:23

## 2021-08-08 RX ADMIN — GUAIFENESIN 600 MG: 600 TABLET ORAL at 21:05

## 2021-08-08 RX ADMIN — DOXYCYCLINE HYCLATE 100 MG: 100 TABLET, COATED ORAL at 21:05

## 2021-08-08 RX ADMIN — BUDESONIDE 1000 MCG: 0.5 SUSPENSION RESPIRATORY (INHALATION) at 20:15

## 2021-08-08 RX ADMIN — METHYLPREDNISOLONE SODIUM SUCCINATE 60 MG: 125 INJECTION, POWDER, FOR SOLUTION INTRAMUSCULAR; INTRAVENOUS at 07:00

## 2021-08-08 RX ADMIN — ISOSORBIDE MONONITRATE 30 MG: 30 TABLET, EXTENDED RELEASE ORAL at 10:03

## 2021-08-08 RX ADMIN — Medication 10 ML: at 22:00

## 2021-08-08 RX ADMIN — IPRATROPIUM BROMIDE AND ALBUTEROL SULFATE 3 ML: .5; 3 SOLUTION RESPIRATORY (INHALATION) at 20:09

## 2021-08-08 RX ADMIN — METHYLPREDNISOLONE SODIUM SUCCINATE 60 MG: 125 INJECTION, POWDER, FOR SOLUTION INTRAMUSCULAR; INTRAVENOUS at 15:23

## 2021-08-08 RX ADMIN — IPRATROPIUM BROMIDE AND ALBUTEROL SULFATE 3 ML: .5; 3 SOLUTION RESPIRATORY (INHALATION) at 07:19

## 2021-08-08 RX ADMIN — ASPIRIN 81 MG: 81 TABLET, CHEWABLE ORAL at 10:03

## 2021-08-08 RX ADMIN — CLOPIDOGREL BISULFATE 75 MG: 75 TABLET, FILM COATED ORAL at 10:03

## 2021-08-08 RX ADMIN — MONTELUKAST 10 MG: 10 TABLET, FILM COATED ORAL at 17:44

## 2021-08-08 NOTE — PROGRESS NOTES
[facility]  [address] [facility], [place], [address] [place] [address]  [phone]      Medical Progress Note      NAME: Dalia Rizzo   :  1960  MRM:  [id number]    Date/Time: 2021        Assessment / Plan:     61 yo F w/ hx of HTN, DM, PFO s/p closure, COPD, CVA presenting w/ CP, found to have NSTEMI and COPD exacerbation       NSTEMI: LHC on  showed abnormal coronary circulation with no true Lcx, trifurcating diagonal and moderate mid into distal LAD disease. Cardiology recommends GDMT for ACS, including DAPT, statin, BB. ISMN. Consider iFR guided revascularization of LAD if pt continues to have chest pain symptoms. Monitor for hypotension. Added holding parameters for antihypertensives      COPD exacerbation: severe, diffuse exp wheezing persists. Improving very slowly. Cont duo-nebs. Wean IV Solu-medrol today. Cont Singular, doxycycline. Appreciate pulmonology following      HTN (hypertension), benign: BP controlled. BP low-normal. Holding arameter for BB and nitrate in place      Hypothyroidism: TSH undetectable. FT4 on upper range of normal. Hold levothyroxine       DM (diabetes mellitus), type 2: not listing meds, and pt does not appear to have DM with A1c 6.0%. BG high on steroids so we will continue SSI in hospital for now      Depression: cont Paxil      Tobacco abuse: 1/2 ppd active smoker. Smoking cessation would be crucial for her      Total time spent: 25 minutes  Time spent in the care of this patient including reviewing records, discussing with nursing and/or other providers on the treatment team, obtaining history and examining the patient, and discussing treatment plans.                   Care Plan discussed with: Patient, Nursing Staff and >50% of time spent in counseling and coordination of care    Discussed:  Care Plan and D/C Planning    Prophylaxis:  Lovenox    Disposition:  Home w/Family         Subjective:     Chief Complaint:  Follow up chest pain    Chart/notes/labs/studies reviewed, patient examined. Feels better today. Less SOB. Still wheezing  No fever        Objective:       Vitals:        Last 24hrs VS reviewed since prior progress note. Most recent are:    Visit Vitals  BP (!) 100/51 (BP 1 Location: Left upper arm, BP Patient Position: At rest)   Pulse 84   Temp 97.9 °F (36.6 °C)   Resp 18   Ht 5' (1.524 m)   Wt 61.7 kg (136 lb)   SpO2 96%   BMI 26.56 kg/m²     SpO2 Readings from Last 6 Encounters:   08/08/21 96%   06/24/21 94%   07/28/20 96%   06/06/20 97%   05/21/20 97%   04/05/20 97%    O2 Flow Rate (L/min): 2 l/min     No intake or output data in the 24 hours ending 08/08/21 1656       Exam:     Physical Exam:    Gen:  Well-developed, well-nourished, in no acute distress  HEENT:  Atraumatic, normocephalic. Sclerae nonicteric, hearing intact to voice  Neck:  Supple, without apparent masses. Resp:  No accessory muscle use, increased WOB. Diffuse scattered exp wheezing although better air movement today  Card: distant, RRR, without m/r/g. No LE edema  Abd:  +bowel sounds, soft, NTTP, nondistended. No HSM  Neuro: Face symmetric, speech fluent, follows commands appropriately, no focal weakness or numbness  Psych:  Alert, oriented x 3.  Fair insight     Medications Reviewed: (see below)    Lab Data Reviewed: (see below)    ______________________________________________________________________    Medications:     Current Facility-Administered Medications   Medication Dose Route Frequency    methylPREDNISolone (PF) (Solu-MEDROL) injection 40 mg  40 mg IntraVENous Q8H    metoprolol succinate (TOPROL-XL) XL tablet 25 mg  25 mg Oral DAILY    arformoteroL (BROVANA) neb solution 15 mcg  15 mcg Nebulization BID RT    albuterol-ipratropium (DUO-NEB) 2.5 MG-0.5 MG/3 ML  3 mL Nebulization Q6H RT    isosorbide mononitrate ER (IMDUR) tablet 30 mg  30 mg Oral DAILY    famotidine (PEPCID) tablet 20 mg  20 mg Oral DAILY    insulin lispro (HUMALOG) injection   SubCUTAneous AC&HS    montelukast (SINGULAIR) tablet 10 mg  10 mg Oral QPM    PARoxetine (PAXIL) tablet 20 mg  20 mg Oral DAILY    naloxone (NARCAN) injection 0.4 mg  0.4 mg IntraVENous EVERY 2 MINUTES AS NEEDED    budesonide (PULMICORT) 500 mcg/2 ml nebulizer suspension  1,000 mcg Nebulization BID RT    guaiFENesin ER (MUCINEX) tablet 600 mg  600 mg Oral Q12H    doxycycline (VIBRA-TABS) tablet 100 mg  100 mg Oral Q12H    sodium chloride (NS) flush 5-40 mL  5-40 mL IntraVENous Q8H    sodium chloride (NS) flush 5-40 mL  5-40 mL IntraVENous PRN    sodium chloride (NS) flush 5-40 mL  5-40 mL IntraVENous Q8H    sodium chloride (NS) flush 5-40 mL  5-40 mL IntraVENous PRN    aspirin chewable tablet 81 mg  81 mg Oral DAILY    clopidogreL (PLAVIX) tablet 75 mg  75 mg Oral DAILY    atorvastatin (LIPITOR) tablet 80 mg  80 mg Oral QHS    acetaminophen (TYLENOL) tablet 650 mg  650 mg Oral Q4H PRN    oxyCODONE IR (ROXICODONE) tablet 5 mg  5 mg Oral Q4H PRN    LORazepam (ATIVAN) injection 1 mg  1 mg IntraVENous Q6H PRN    enoxaparin (LOVENOX) injection 60 mg  1 mg/kg SubCUTAneous Q12H    melatonin (rapid dissolve) tablet 5 mg  5 mg Oral QHS PRN    albuterol (PROVENTIL VENTOLIN) nebulizer solution 2.5 mg  2.5 mg Nebulization Q4H PRN    glucose chewable tablet 16 g  4 Tablet Oral PRN    dextrose (D50W) injection syrg 12.5-25 g  25-50 mL IntraVENous PRN    glucagon (GLUCAGEN) injection 1 mg  1 mg IntraMUSCular PRN    [Held by provider] levothyroxine (SYNTHROID) tablet 125 mcg  125 mcg Oral 6am    morphine injection 2 mg  2 mg IntraVENous Q3H PRN     Facility-Administered Medications Ordered in Other Encounters   Medication Dose Route Frequency    sodium chloride (NS) flush 10 mL  10 mL IntraVENous PRN            Lab Review:     Recent Labs     08/08/21  0426 08/07/21  0600 08/06/21  0121   WBC 11.8* 14.3* 10.5   HGB 13.3 13.7 12.9   HCT 40.1 42.8 40.8    261 234     Recent Labs 08/08/21  0426 08/07/21  0600 08/06/21  0121    138 142   K 4.4 4.4 4.4    110* 108   CO2 32 28 30   * 152* 167*   BUN 15 13 22*   CREA 0.60 0.59 0.64   CA 8.5 8.5 8.1*   MG 2.1 2.3 2.1   PHOS 3.9 2.9  --    ALB 3.0* 3.1*  --      No components found for: GLPOC  No results for input(s): PH, PCO2, PO2, HCO3, FIO2 in the last 72 hours. No results for input(s): INR, INREXT, INREXT in the last 72 hours.   No results found for: SDES  No results found for: CULT           ___________________________________________________    Attending Physician: Caty Dean MD

## 2021-08-08 NOTE — PROGRESS NOTES
Bedside and Verbal shift change report given to Vijaya Mora RN (oncoming nurse) by Quentin Arias (offgoing nurse). Report included the following information SBAR, Kardex, ED Summary, MAR, Recent Results and Cardiac Rhythm SR. This patient was assisted with Intentional Toileting every 2 hours during this shift as appropriate. Documentation of ambulation and output reflected on Flowsheet as appropriate. Purposeful hourly rounding was completed using AIDET and 5Ps. Outcomes of PHR documented as they occurred. Bed alarm in use as appropriate. Dual Suction and ambubag in place. Bedside and Verbal shift change report given to Tarah Mustafa RN (oncoming nurse) by Vijaya Mora RN (offgoing nurse).  Report included the following information SBAR, Kardex, ED Summary, MAR, Recent Results and Cardiac Rhythm SR.

## 2021-08-09 LAB
ALBUMIN SERPL-MCNC: 3.2 G/DL (ref 3.5–5)
ANION GAP SERPL CALC-SCNC: 4 MMOL/L (ref 5–15)
BASOPHILS # BLD: 0 K/UL (ref 0–0.1)
BASOPHILS NFR BLD: 0 % (ref 0–1)
BUN SERPL-MCNC: 24 MG/DL (ref 6–20)
BUN/CREAT SERPL: 33 (ref 12–20)
CALCIUM SERPL-MCNC: 8.3 MG/DL (ref 8.5–10.1)
CHLORIDE SERPL-SCNC: 104 MMOL/L (ref 97–108)
CO2 SERPL-SCNC: 32 MMOL/L (ref 21–32)
COMMENT, HOLDF: NORMAL
CREAT SERPL-MCNC: 0.72 MG/DL (ref 0.55–1.02)
DIFFERENTIAL METHOD BLD: ABNORMAL
EOSINOPHIL # BLD: 0 K/UL (ref 0–0.4)
EOSINOPHIL NFR BLD: 0 % (ref 0–7)
ERYTHROCYTE [DISTWIDTH] IN BLOOD BY AUTOMATED COUNT: 13.7 % (ref 11.5–14.5)
GLUCOSE BLD STRIP.AUTO-MCNC: 103 MG/DL (ref 65–117)
GLUCOSE BLD STRIP.AUTO-MCNC: 106 MG/DL (ref 65–117)
GLUCOSE BLD STRIP.AUTO-MCNC: 115 MG/DL (ref 65–117)
GLUCOSE BLD STRIP.AUTO-MCNC: 122 MG/DL (ref 65–117)
GLUCOSE SERPL-MCNC: 157 MG/DL (ref 65–100)
HCT VFR BLD AUTO: 42.6 % (ref 35–47)
HGB BLD-MCNC: 14 G/DL (ref 11.5–16)
IMM GRANULOCYTES # BLD AUTO: 0 K/UL
IMM GRANULOCYTES NFR BLD AUTO: 0 %
LYMPHOCYTES # BLD: 1.2 K/UL (ref 0.8–3.5)
LYMPHOCYTES NFR BLD: 7 % (ref 12–49)
MAGNESIUM SERPL-MCNC: 2.3 MG/DL (ref 1.6–2.4)
MCH RBC QN AUTO: 31.3 PG (ref 26–34)
MCHC RBC AUTO-ENTMCNC: 32.9 G/DL (ref 30–36.5)
MCV RBC AUTO: 95.3 FL (ref 80–99)
MONOCYTES # BLD: 0.8 K/UL (ref 0–1)
MONOCYTES NFR BLD: 5 % (ref 5–13)
MYELOCYTES NFR BLD MANUAL: 2 %
NEUTS BAND NFR BLD MANUAL: 1 % (ref 0–6)
NEUTS SEG # BLD: 14.2 K/UL (ref 1.8–8)
NEUTS SEG NFR BLD: 85 % (ref 32–75)
NRBC # BLD: 0 K/UL (ref 0–0.01)
NRBC BLD-RTO: 0 PER 100 WBC
PHOSPHATE SERPL-MCNC: 3.4 MG/DL (ref 2.6–4.7)
PLATELET # BLD AUTO: 287 K/UL (ref 150–400)
PMV BLD AUTO: 9.2 FL (ref 8.9–12.9)
POTASSIUM SERPL-SCNC: 4.4 MMOL/L (ref 3.5–5.1)
RBC # BLD AUTO: 4.47 M/UL (ref 3.8–5.2)
RBC MORPH BLD: ABNORMAL
SAMPLES BEING HELD,HOLD: NORMAL
SERVICE CMNT-IMP: ABNORMAL
SERVICE CMNT-IMP: NORMAL
SODIUM SERPL-SCNC: 140 MMOL/L (ref 136–145)
WBC # BLD AUTO: 16.5 K/UL (ref 3.6–11)

## 2021-08-09 PROCEDURE — 74011250637 HC RX REV CODE- 250/637: Performed by: INTERNAL MEDICINE

## 2021-08-09 PROCEDURE — 82962 GLUCOSE BLOOD TEST: CPT

## 2021-08-09 PROCEDURE — 77010033678 HC OXYGEN DAILY

## 2021-08-09 PROCEDURE — 74011250637 HC RX REV CODE- 250/637: Performed by: HOSPITALIST

## 2021-08-09 PROCEDURE — 85025 COMPLETE CBC W/AUTO DIFF WBC: CPT

## 2021-08-09 PROCEDURE — 80069 RENAL FUNCTION PANEL: CPT

## 2021-08-09 PROCEDURE — 74011000250 HC RX REV CODE- 250: Performed by: INTERNAL MEDICINE

## 2021-08-09 PROCEDURE — 74011250636 HC RX REV CODE- 250/636: Performed by: INTERNAL MEDICINE

## 2021-08-09 PROCEDURE — 94640 AIRWAY INHALATION TREATMENT: CPT

## 2021-08-09 PROCEDURE — 83735 ASSAY OF MAGNESIUM: CPT

## 2021-08-09 PROCEDURE — 74011250636 HC RX REV CODE- 250/636: Performed by: HOSPITALIST

## 2021-08-09 PROCEDURE — 94760 N-INVAS EAR/PLS OXIMETRY 1: CPT

## 2021-08-09 PROCEDURE — 65660000000 HC RM CCU STEPDOWN

## 2021-08-09 RX ORDER — ENOXAPARIN SODIUM 100 MG/ML
40 INJECTION SUBCUTANEOUS EVERY 24 HOURS
Status: DISCONTINUED | OUTPATIENT
Start: 2021-08-10 | End: 2021-08-10 | Stop reason: HOSPADM

## 2021-08-09 RX ADMIN — ARFORMOTEROL TARTRATE 15 MCG: 15 SOLUTION RESPIRATORY (INHALATION) at 19:35

## 2021-08-09 RX ADMIN — GUAIFENESIN 600 MG: 600 TABLET ORAL at 22:19

## 2021-08-09 RX ADMIN — BUDESONIDE 1000 MCG: 0.5 SUSPENSION RESPIRATORY (INHALATION) at 19:35

## 2021-08-09 RX ADMIN — Medication 10 ML: at 06:18

## 2021-08-09 RX ADMIN — METOPROLOL SUCCINATE 25 MG: 25 TABLET, EXTENDED RELEASE ORAL at 09:47

## 2021-08-09 RX ADMIN — Medication 10 ML: at 06:17

## 2021-08-09 RX ADMIN — IPRATROPIUM BROMIDE AND ALBUTEROL SULFATE 3 ML: .5; 3 SOLUTION RESPIRATORY (INHALATION) at 13:20

## 2021-08-09 RX ADMIN — ASPIRIN 81 MG: 81 TABLET, CHEWABLE ORAL at 09:47

## 2021-08-09 RX ADMIN — METHYLPREDNISOLONE SODIUM SUCCINATE 40 MG: 40 INJECTION, POWDER, FOR SOLUTION INTRAMUSCULAR; INTRAVENOUS at 22:18

## 2021-08-09 RX ADMIN — GUAIFENESIN 600 MG: 600 TABLET ORAL at 09:47

## 2021-08-09 RX ADMIN — IPRATROPIUM BROMIDE AND ALBUTEROL SULFATE 3 ML: .5; 3 SOLUTION RESPIRATORY (INHALATION) at 19:35

## 2021-08-09 RX ADMIN — ENOXAPARIN SODIUM 60 MG: 60 INJECTION SUBCUTANEOUS at 09:49

## 2021-08-09 RX ADMIN — PAROXETINE HYDROCHLORIDE 20 MG: 20 TABLET, FILM COATED ORAL at 09:00

## 2021-08-09 RX ADMIN — BUDESONIDE 1000 MCG: 0.5 SUSPENSION RESPIRATORY (INHALATION) at 07:12

## 2021-08-09 RX ADMIN — DOXYCYCLINE HYCLATE 100 MG: 100 TABLET, COATED ORAL at 22:19

## 2021-08-09 RX ADMIN — IPRATROPIUM BROMIDE AND ALBUTEROL SULFATE 3 ML: .5; 3 SOLUTION RESPIRATORY (INHALATION) at 07:07

## 2021-08-09 RX ADMIN — ATORVASTATIN CALCIUM 80 MG: 20 TABLET, FILM COATED ORAL at 22:19

## 2021-08-09 RX ADMIN — Medication 10 ML: at 17:29

## 2021-08-09 RX ADMIN — FAMOTIDINE 20 MG: 20 TABLET ORAL at 09:47

## 2021-08-09 RX ADMIN — ARFORMOTEROL TARTRATE 15 MCG: 15 SOLUTION RESPIRATORY (INHALATION) at 07:12

## 2021-08-09 RX ADMIN — CLOPIDOGREL BISULFATE 75 MG: 75 TABLET, FILM COATED ORAL at 09:47

## 2021-08-09 RX ADMIN — Medication 10 ML: at 22:19

## 2021-08-09 RX ADMIN — DOXYCYCLINE HYCLATE 100 MG: 100 TABLET, COATED ORAL at 09:47

## 2021-08-09 RX ADMIN — MONTELUKAST 10 MG: 10 TABLET, FILM COATED ORAL at 17:31

## 2021-08-09 RX ADMIN — Medication 10 ML: at 22:20

## 2021-08-09 RX ADMIN — IPRATROPIUM BROMIDE AND ALBUTEROL SULFATE 3 ML: .5; 3 SOLUTION RESPIRATORY (INHALATION) at 01:52

## 2021-08-09 RX ADMIN — METHYLPREDNISOLONE SODIUM SUCCINATE 40 MG: 40 INJECTION, POWDER, FOR SOLUTION INTRAMUSCULAR; INTRAVENOUS at 06:17

## 2021-08-09 NOTE — PROGRESS NOTES
TRANSFER - IN REPORT:    Verbal report received from Maritza(name) on Flor Marlon  being received from [facility](unit) for routine progression of care      Report consisted of patients Situation, Background, Assessment and   Recommendations(SBAR). Information from the following report(s) SBAR, Kardex, Intake/Output, MAR, Recent Results and Med Rec Status was reviewed with the receiving nurse. Opportunity for questions and clarification was provided. Assessment completed upon patients arrival to unit and care assumed.        0105:  Primary Nurse Silverio Dakin, RN and Brina Gallardo RN performed a dual skin assessment on this patient No impairment noted  Jd score is 22

## 2021-08-09 NOTE — PROGRESS NOTES
[facility]  Pulmonary, Critical Care, and Sleep Medicine    Initial Patient Consult    Name: Sabrina Gutierrez MRN: [id number]   : 1960 Hospital: [address] [facility]   Date: 2021        IMPRESSION:   · Copd exacerbation - improving  ·  active tobacco use - most recently 1/2 ppd per her report   · outpt kathy eval in progress   · pulm nodules on ct screen done in [place] -  To be fu with repeat imaging in 2021  · Admitted with cp/ nstemi - hx of cad- s/p cath   · covid vaccinated - covid test neg   · Acute hypoxic resp failure - sats 94 % on 1.5 liters nc  ( down from 3 liters)        RECOMMENDATIONS:   · Continue scheduled nebs  · Brovana/Pulmicort  · Solumedrol: wean solumedrol to 40 q 12. W improvement, to po prednisone in next day or so  · Currently on therapeutic lovenox as per cards   · KATHY eval outpt eval  · Mucinex  · Doxy for 5 days  · bsl mgt on steroids   · Nicotine patch might  Be needed but would check with cards first to see if they are ok with this if she asks for it. · Pt wants to transfer her care to par  So par and her cardiologist can coordinate care. Will arrange par fu post dc. Subjective: This patient has been seen and evaluated at the request of    For copd exacerbation . Patient is a 62 y.o. female  With hx of copd, active tobacco use , cad, pfo closure, cva, dm, htn, hld . She  Was sent from her cardiologist office yesterday  To the er with copd exacerbation . She had cp yesterday and was noted to have elevated troponin and bnp. Cxr was without acute dx. Pt and son report that she wheeze every day but has gotten worse over the past week. She denies f,c,cp , pleurisy . She reported had nausea with the cp yesterday but today is free of cp or  Gi s/s . She noted increased lower ext edema recently . She smokes 1/2 ppd down from 2 ppd. She uses neb ( duoneb )  Prn and admits to overuse of  Her albuterol hfa.  ( NO cp with the albuterol.) She was on symbicort but says it was dc and her pulm was trying to find another med that would be covered by her insurance. She is to undergo testing for kathy. She has recent ct chest screen that detected 2 pulm nodules  That per son were thought to be possibly inflammatory as she has pulm congestion when it was done - to be repeated in nov. Her pulm DrLauren  Is in [address] 8/6- chest is sore from coughing . Still wheezing. Was last hospitalized a month ago in [place] for 4 days per her report with copd exacerbation. 8/7: Afebrile. BP stable. Sats 96% on 2L NC. WBC 14.3; increased today. 8/9:  Pt is feeling sig better. Still w wheeze and salmon though    Past Medical History:   Diagnosis Date    Chronic obstructive pulmonary disease ([address]     Diabetes ([facility]     Borderline    Hypercholesteremia     Hypertension     Stroke ([facility]     Thyroid disease     cva  pfo  Past Surgical History:   Procedure Laterality Date    HX APPENDECTOMY      HX CHOLECYSTECTOMY      HX GYN      hysterectomy    NE CARDIAC SURG PROCEDURE UNLIST      cath    oophorectomy   pfo closure   Prior to Admission medications    Medication Sig Start Date End Date Taking? Authorizing Provider   acetaminophen (TYLENOL) 325 mg tablet Take 650 mg by mouth every six (6) hours as needed for Pain (Mild to moderate pain). Yes Provider, Historical   montelukast (SINGULAIR) 10 mg tablet Take 10 mg by mouth every evening. Yes Provider, Historical   metoprolol succinate (Toprol XL) 25 mg XL tablet Take 25 mg by mouth daily. Yes Provider, Historical   levothyroxine (SYNTHROID) 125 mcg tablet Take 125 mcg by mouth Daily (before breakfast). Yes Provider, Historical   traMADoL (ULTRAM) 50 mg tablet Take 50 mg by mouth every eight (8) hours as needed for Pain (Severe pain).  7/9/20  Yes Provider, Historical   albuterol-ipratropium (DUO-NEB) 2.5 mg-0.5 mg/3 ml nebu 3 mL by Nebulization route every four (4) hours as needed for Wheezing, Shortness of Breath or Respiratory Distress. 7/9/20  Yes Provider, Historical   atorvastatin (LIPITOR) 80 mg tablet Take 1 Tab by mouth nightly. 6/26/20  Yes Chela aKtz MD   ARIPiprazole (Abilify) 10 mg tablet Take 10 mg by mouth nightly. 5/11/20  Yes Provider, Historical   aspirin 81 mg chewable tablet Take 81 mg by mouth daily. Yes Provider, Historical   PARoxetine (PaxiL) 20 mg tablet Take 20 mg by mouth daily. Yes Provider, Historical   budesonide-formoteroL (Symbicort) 160-4.5 mcg/actuation HFAA Take 2 Puffs by inhalation two (2) times a day. Yes Provider, Historical   albuterol (PROVENTIL HFA, VENTOLIN HFA, PROAIR HFA) 90 mcg/actuation inhaler Take 2 Puffs by inhalation every six (6) hours as needed for Wheezing.    Yes Provider, Historical     No Known Allergies   Social History     Tobacco Use    Smoking status: Current Every Day Smoker     Packs/day: 1.00    Smokeless tobacco: Never Used   Substance Use Topics    Alcohol use: Never       works in retail    no drugs, thc, etoh     Family History   Problem Relation Age of Onset    Stroke Neg Hx     lung dz in biological father  Cancer - adopted so does not know details    Current Facility-Administered Medications   Medication Dose Route Frequency    methylPREDNISolone (PF) (Solu-MEDROL) injection 40 mg  40 mg IntraVENous Q8H    metoprolol succinate (TOPROL-XL) XL tablet 25 mg  25 mg Oral DAILY    arformoteroL (BROVANA) neb solution 15 mcg  15 mcg Nebulization BID RT    albuterol-ipratropium (DUO-NEB) 2.5 MG-0.5 MG/3 ML  3 mL Nebulization Q6H RT    isosorbide mononitrate ER (IMDUR) tablet 30 mg  30 mg Oral DAILY    famotidine (PEPCID) tablet 20 mg  20 mg Oral DAILY    insulin lispro (HUMALOG) injection   SubCUTAneous AC&HS    montelukast (SINGULAIR) tablet 10 mg  10 mg Oral QPM    PARoxetine (PAXIL) tablet 20 mg  20 mg Oral DAILY    budesonide (PULMICORT) 500 mcg/2 ml nebulizer suspension  1,000 mcg Nebulization BID RT    guaiFENesin ER (MUCINEX) tablet 600 mg  600 mg Oral Q12H    doxycycline (VIBRA-TABS) tablet 100 mg  100 mg Oral Q12H    sodium chloride (NS) flush 5-40 mL  5-40 mL IntraVENous Q8H    sodium chloride (NS) flush 5-40 mL  5-40 mL IntraVENous Q8H    aspirin chewable tablet 81 mg  81 mg Oral DAILY    clopidogreL (PLAVIX) tablet 75 mg  75 mg Oral DAILY    atorvastatin (LIPITOR) tablet 80 mg  80 mg Oral QHS    enoxaparin (LOVENOX) injection 60 mg  1 mg/kg SubCUTAneous Q12H    [Held by provider] levothyroxine (SYNTHROID) tablet 125 mcg  125 mcg Oral 6am       Review of Systems:  A comprehensive review of systems was negative except for that written in the HPI. Objective:   Vital Signs:    Visit Vitals  /70 (BP 1 Location: Left arm, BP Patient Position: At rest;Lying right side)   Pulse 82   Temp 98.1 °F (36.7 °C)   Resp 17   Ht 5' (1.524 m)   Wt 61.7 kg (136 lb)   SpO2 93%   BMI 26.56 kg/m²       O2 Device: Nasal cannula   O2 Flow Rate (L/min): 2 l/min   Temp (24hrs), Av.2 °F (36.8 °C), Min:97.8 °F (36.6 °C), Max:98.8 °F (37.1 °C)       Intake/Output:   Last shift:      No intake/output data recorded. Last 3 shifts:  1901 -  0700  In: 480 [P.O.:480]  Out: -     Intake/Output Summary (Last 24 hours) at 2021 1135  Last data filed at 2021 1839  Gross per 24 hour   Intake 360 ml   Output --   Net 360 ml      Physical Exam:   General:  Alert, cooperative, no distress, appears stated age. sitting in chair    Head:  Normocephalic, without obvious abnormality, atraumatic. Eyes:  Conjunctivae/corneas clear. PERRL, EOMs intact. Nose: Nares normal. Septum midline. . No drainage, gruff voice    Throat: Lips, mucosa, and tongue normal. Teeth and gums normal.M4   Neck: Supple, symmetrical, trachea midline, no adenopathy, thyroid: no enlargment/tenderness/nodules, no JVD. Back:   Symmetric, no curvature. ROM normal.   Lungs:    Moderate diffuse wheeze   Chest wall:  No tenderness or deformity. Heart:  Regular rate and rhythm, S1, S2 normal, no murmur, click, rub or gallop. Abdomen:   Soft, non-tender. Bowel sounds normal.    Extremities: Extremities normal, atraumatic, no cyanosis - trace pretibial edema    Pulses: 2+   Skin: Skin color, texture, turgor normal. No rashes or lesions   Lymph nodes: Cervical, supraclavicular,  nodes normal.   Neurologic: Grossly nonfocal     Data review:     Recent Results (from the past 24 hour(s))   GLUCOSE, POC    Collection Time: 08/08/21 12:10 PM   Result Value Ref Range    Glucose (POC) 119 (H) 65 - 117 mg/dL    Performed by Missy Cisneros (PCT)    GLUCOSE, POC    Collection Time: 08/08/21  4:44 PM   Result Value Ref Range    Glucose (POC) 108 65 - 117 mg/dL    Performed by Missy Cisneros (PCT)    GLUCOSE, POC    Collection Time: 08/08/21  8:50 PM   Result Value Ref Range    Glucose (POC) 162 (H) 65 - 117 mg/dL    Performed by Bran Hendrickson    CBC WITH AUTOMATED DIFF    Collection Time: 08/09/21 12:45 AM   Result Value Ref Range    WBC 16.5 (H) 3.6 - 11.0 K/uL    RBC 4.47 3.80 - 5.20 M/uL    HGB 14.0 11.5 - 16.0 g/dL    HCT 42.6 35.0 - 47.0 %    MCV 95.3 80.0 - 99.0 FL    MCH 31.3 26.0 - 34.0 PG    MCHC 32.9 30.0 - 36.5 g/dL    RDW 13.7 11.5 - 14.5 %    PLATELET 270 365 - 755 K/uL    MPV 9.2 8.9 - 12.9 FL    NRBC 0.0 0  WBC    ABSOLUTE NRBC 0.00 0.00 - 0.01 K/uL    NEUTROPHILS 85 (H) 32 - 75 %    BAND NEUTROPHILS 1 0 - 6 %    LYMPHOCYTES 7 (L) 12 - 49 %    MONOCYTES 5 5 - 13 %    EOSINOPHILS 0 0 - 7 %    BASOPHILS 0 0 - 1 %    MYELOCYTES 2 (H) 0 %    IMMATURE GRANULOCYTES 0 %    ABS. NEUTROPHILS 14.2 (H) 1.8 - 8.0 K/UL    ABS. LYMPHOCYTES 1.2 0.8 - 3.5 K/UL    ABS. MONOCYTES 0.8 0.0 - 1.0 K/UL    ABS. EOSINOPHILS 0.0 0.0 - 0.4 K/UL    ABS. BASOPHILS 0.0 0.0 - 0.1 K/UL    ABS. IMM.  GRANS. 0.0 K/UL    DF MANUAL      RBC COMMENTS NORMOCYTIC, NORMOCHROMIC     RENAL FUNCTION PANEL    Collection Time: 08/09/21 12:45 AM   Result Value Ref Range    Sodium 140 136 - 145 mmol/L    Potassium 4.4 3.5 - 5.1 mmol/L    Chloride 104 97 - 108 mmol/L    CO2 32 21 - 32 mmol/L    Anion gap 4 (L) 5 - 15 mmol/L    Glucose 157 (H) 65 - 100 mg/dL    BUN 24 (H) 6 - 20 MG/DL    Creatinine 0.72 0.55 - 1.02 MG/DL    BUN/Creatinine ratio 33 (H) 12 - 20      GFR est AA >60 >60 ml/min/1.73m2    GFR est non-AA >60 >60 ml/min/1.73m2    Calcium 8.3 (L) 8.5 - 10.1 MG/DL    Phosphorus 3.4 2.6 - 4.7 MG/DL    Albumin 3.2 (L) 3.5 - 5.0 g/dL   MAGNESIUM    Collection Time: 08/09/21 12:45 AM   Result Value Ref Range    Magnesium 2.3 1.6 - 2.4 mg/dL   SAMPLES BEING HELD    Collection Time: 08/09/21 12:45 AM   Result Value Ref Range    SAMPLES BEING HELD 1SST     COMMENT        Add-on orders for these samples will be processed based on acceptable specimen integrity and analyte stability, which may vary by analyte.    GLUCOSE, POC    Collection Time: 08/09/21  6:16 AM   Result Value Ref Range    Glucose (POC) 122 (H) 65 - 117 mg/dL    Performed by Isauro Stone (PCT)        Imaging:  I have personally reviewed the patients radiographs and have reviewed the reports:  cxr - ap - nad         Ishan Lujan MD

## 2021-08-09 NOTE — PROGRESS NOTES
[facility] [address]  [address] [facility], [place], [address] [facility]  [phone]      Medical Progress Note      NAME: Maya Negron   :  1960  MRM:  [id number]    Date/Time: 2021        Assessment / Plan:     63 yo F w/ hx of HTN, DM, PFO s/p closure, COPD, CVA presenting w/ CP, found to have NSTEMI and COPD exacerbation       NSTEMI: LHC on  showed abnormal coronary circulation with no true Lcx, trifurcating diagonal and moderate mid into distal LAD disease. Cardiology recommends GDMT for ACS, including DAPT, statin, BB. ISMN. Consider iFR guided revascularization of LAD if pt continues to have chest pain symptoms. COPD exacerbation: severe, improving slowly. Wheezing better. Wean IV steroids. Cont duo-nebs, ICS/LABA, doxy, Singular. Pulmonology following      HTN (hypertension), benign: BP controlled. Cont metoprolol and ISMN      Hypothyroidism: TSH undetectable. FT4 on upper range of normal. HOLDING LT4      DM (diabetes mellitus), type 2: not listing meds, she does not appear to have DM with A1c 6.0%. BG high on steroids so okay to cont SSI for now      Depression: cont Paxil      Tobacco abuse: 1/2 ppd active smoker. Smoking cessation advised and would be crucial for her      Total time spent: 25 minutes  Time spent in the care of this patient including reviewing records, discussing with nursing and/or other providers on the treatment team, obtaining history and examining the patient, and discussing treatment plans. Care Plan discussed with: Patient, Nursing Staff and >50% of time spent in counseling and coordination of care    Discussed:  Care Plan and D/C Planning    Prophylaxis:  Lovenox    Disposition:  Home w/Family         Subjective:     Chief Complaint:  Follow up chest pain    Chart/notes/labs/studies reviewed, patient examined. Feeling better. Less SOB.  No CP  No fever        Objective:       Vitals:        Last 24hrs VS reviewed since prior progress note. Most recent are:    Visit Vitals  /68 (BP 1 Location: Left upper arm, BP Patient Position: Semi fowlers)   Pulse 71   Temp 98.2 °F (36.8 °C)   Resp 17   Ht 5' (1.524 m)   Wt 61.7 kg (136 lb)   SpO2 96%   BMI 26.56 kg/m²     SpO2 Readings from Last 6 Encounters:   08/09/21 96%   06/24/21 94%   07/28/20 96%   06/06/20 97%   05/21/20 97%   04/05/20 97%    O2 Flow Rate (L/min): 2 l/min       Intake/Output Summary (Last 24 hours) at 8/9/2021 1756  Last data filed at 8/8/2021 1839  Gross per 24 hour   Intake 120 ml   Output --   Net 120 ml          Exam:     Physical Exam:    Gen:  Well-developed, well-nourished, in no acute distress  HEENT:  Atraumatic, normocephalic. Sclerae nonicteric, hearing intact to voice  Neck:  Supple, without apparent masses. Resp:  No accessory muscle use, increased WOB. Diffuse scattered exp wheezing improved. Better air movement today  Card: distant, RRR, without m/r/g. No LE edema  Abd:  +bowel sounds, soft, NTTP, nondistended. No HSM  Neuro: Face symmetric, speech fluent, follows commands appropriately, no focal weakness or numbness  Psych:  Alert, oriented x 3.  Fair insight     Medications Reviewed: (see below)    Lab Data Reviewed: (see below)    ______________________________________________________________________    Medications:     Current Facility-Administered Medications   Medication Dose Route Frequency    methylPREDNISolone (PF) (SOLU-MEDROL) injection 40 mg  40 mg IntraVENous Q12H    [START ON 8/10/2021] enoxaparin (LOVENOX) injection 40 mg  40 mg SubCUTAneous Q24H    metoprolol succinate (TOPROL-XL) XL tablet 25 mg  25 mg Oral DAILY    arformoteroL (BROVANA) neb solution 15 mcg  15 mcg Nebulization BID RT    albuterol-ipratropium (DUO-NEB) 2.5 MG-0.5 MG/3 ML  3 mL Nebulization Q6H RT    isosorbide mononitrate ER (IMDUR) tablet 30 mg  30 mg Oral DAILY    famotidine (PEPCID) tablet 20 mg  20 mg Oral DAILY    insulin lispro (HUMALOG) injection SubCUTAneous AC&HS    montelukast (SINGULAIR) tablet 10 mg  10 mg Oral QPM    PARoxetine (PAXIL) tablet 20 mg  20 mg Oral DAILY    naloxone (NARCAN) injection 0.4 mg  0.4 mg IntraVENous EVERY 2 MINUTES AS NEEDED    budesonide (PULMICORT) 500 mcg/2 ml nebulizer suspension  1,000 mcg Nebulization BID RT    guaiFENesin ER (MUCINEX) tablet 600 mg  600 mg Oral Q12H    doxycycline (VIBRA-TABS) tablet 100 mg  100 mg Oral Q12H    sodium chloride (NS) flush 5-40 mL  5-40 mL IntraVENous Q8H    sodium chloride (NS) flush 5-40 mL  5-40 mL IntraVENous PRN    sodium chloride (NS) flush 5-40 mL  5-40 mL IntraVENous Q8H    sodium chloride (NS) flush 5-40 mL  5-40 mL IntraVENous PRN    aspirin chewable tablet 81 mg  81 mg Oral DAILY    clopidogreL (PLAVIX) tablet 75 mg  75 mg Oral DAILY    atorvastatin (LIPITOR) tablet 80 mg  80 mg Oral QHS    acetaminophen (TYLENOL) tablet 650 mg  650 mg Oral Q4H PRN    oxyCODONE IR (ROXICODONE) tablet 5 mg  5 mg Oral Q4H PRN    LORazepam (ATIVAN) injection 1 mg  1 mg IntraVENous Q6H PRN    melatonin (rapid dissolve) tablet 5 mg  5 mg Oral QHS PRN    albuterol (PROVENTIL VENTOLIN) nebulizer solution 2.5 mg  2.5 mg Nebulization Q4H PRN    glucose chewable tablet 16 g  4 Tablet Oral PRN    dextrose (D50W) injection syrg 12.5-25 g  25-50 mL IntraVENous PRN    glucagon (GLUCAGEN) injection 1 mg  1 mg IntraMUSCular PRN    [Held by provider] levothyroxine (SYNTHROID) tablet 125 mcg  125 mcg Oral 6am    morphine injection 2 mg  2 mg IntraVENous Q3H PRN     Facility-Administered Medications Ordered in Other Encounters   Medication Dose Route Frequency    sodium chloride (NS) flush 10 mL  10 mL IntraVENous PRN            Lab Review:     Recent Labs     08/09/21 0045 08/08/21 0426 08/07/21  0600   WBC 16.5* 11.8* 14.3*   HGB 14.0 13.3 13.7   HCT 42.6 40.1 42.8    252 261     Recent Labs     08/09/21 0045 08/08/21 0426 08/07/21  0600    141 138   K 4.4 4.4 4.4    106 110*   CO2 32 32 28   * 172* 152*   BUN 24* 15 13   CREA 0.72 0.60 0.59   CA 8.3* 8.5 8.5   MG 2.3 2.1 2.3   PHOS 3.4 3.9 2.9   ALB 3.2* 3.0* 3.1*     No components found for: GLPOC  No results for input(s): PH, PCO2, PO2, HCO3, FIO2 in the last 72 hours. No results for input(s): INR, INREXT, INREXT in the last 72 hours.   No results found for: SDES  No results found for: CULT           ___________________________________________________    Attending Physician: Vasquez Zavala MD

## 2021-08-09 NOTE — PROGRESS NOTES
8/9/2021 9:20 AM EMR reviewed. CM will follow for final discharge needs. IVIS Snow     RUR: 24%   Risk Level: []Low [x]Moderate []High  Value-based purchasing: [] Yes [x] No  Bundle patient: [] Yes [x] No   Specify:     Transition of Care Plan:                   1. Family to transport home at d/c  2. Cath done 8/5  3. Pulmonary and cards following  4. Pt to wean off O2; will need home O2 eval  5.  CM following for any needs

## 2021-08-10 VITALS
HEIGHT: 60 IN | HEART RATE: 70 BPM | DIASTOLIC BLOOD PRESSURE: 67 MMHG | WEIGHT: 136 LBS | SYSTOLIC BLOOD PRESSURE: 104 MMHG | TEMPERATURE: 98.3 F | OXYGEN SATURATION: 92 % | BODY MASS INDEX: 26.7 KG/M2 | RESPIRATION RATE: 17 BRPM

## 2021-08-10 PROBLEM — I21.4 NSTEMI (NON-ST ELEVATED MYOCARDIAL INFARCTION) (HCC): Status: RESOLVED | Noted: 2021-08-04 | Resolved: 2021-08-10

## 2021-08-10 PROBLEM — R06.89 ACUTE RESPIRATORY INSUFFICIENCY: Status: RESOLVED | Noted: 2021-08-04 | Resolved: 2021-08-10

## 2021-08-10 PROBLEM — R07.9 CHEST PAIN: Status: RESOLVED | Noted: 2021-08-04 | Resolved: 2021-08-10

## 2021-08-10 LAB
ALBUMIN SERPL-MCNC: 3.1 G/DL (ref 3.5–5)
ANION GAP SERPL CALC-SCNC: 2 MMOL/L (ref 5–15)
BASOPHILS # BLD: 0 K/UL (ref 0–0.1)
BASOPHILS NFR BLD: 0 % (ref 0–1)
BUN SERPL-MCNC: 25 MG/DL (ref 6–20)
BUN/CREAT SERPL: 32 (ref 12–20)
CALCIUM SERPL-MCNC: 8.2 MG/DL (ref 8.5–10.1)
CHLORIDE SERPL-SCNC: 106 MMOL/L (ref 97–108)
CO2 SERPL-SCNC: 28 MMOL/L (ref 21–32)
CREAT SERPL-MCNC: 0.79 MG/DL (ref 0.55–1.02)
DIFFERENTIAL METHOD BLD: ABNORMAL
EOSINOPHIL # BLD: 0 K/UL (ref 0–0.4)
EOSINOPHIL NFR BLD: 0 % (ref 0–7)
ERYTHROCYTE [DISTWIDTH] IN BLOOD BY AUTOMATED COUNT: 13.6 % (ref 11.5–14.5)
GLUCOSE BLD STRIP.AUTO-MCNC: 132 MG/DL (ref 65–117)
GLUCOSE BLD STRIP.AUTO-MCNC: 141 MG/DL (ref 65–117)
GLUCOSE SERPL-MCNC: 163 MG/DL (ref 65–100)
HCT VFR BLD AUTO: 48.6 % (ref 35–47)
HGB BLD-MCNC: 15.8 G/DL (ref 11.5–16)
IMM GRANULOCYTES # BLD AUTO: 0 K/UL
IMM GRANULOCYTES NFR BLD AUTO: 0 %
LYMPHOCYTES # BLD: 2.2 K/UL (ref 0.8–3.5)
LYMPHOCYTES NFR BLD: 11 % (ref 12–49)
MAGNESIUM SERPL-MCNC: NORMAL MG/DL (ref 1.6–2.4)
MCH RBC QN AUTO: 31.7 PG (ref 26–34)
MCHC RBC AUTO-ENTMCNC: 32.5 G/DL (ref 30–36.5)
MCV RBC AUTO: 97.6 FL (ref 80–99)
MONOCYTES # BLD: 1.4 K/UL (ref 0–1)
MONOCYTES NFR BLD: 7 % (ref 5–13)
NEUTS SEG # BLD: 16.3 K/UL (ref 1.8–8)
NEUTS SEG NFR BLD: 82 % (ref 32–75)
NRBC # BLD: 0 K/UL (ref 0–0.01)
NRBC BLD-RTO: 0 PER 100 WBC
PHOSPHATE SERPL-MCNC: 2.7 MG/DL (ref 2.6–4.7)
PLATELET # BLD AUTO: 333 K/UL (ref 150–400)
PMV BLD AUTO: 9.6 FL (ref 8.9–12.9)
POTASSIUM SERPL-SCNC: ABNORMAL MMOL/L (ref 3.5–5.1)
RBC # BLD AUTO: 4.98 M/UL (ref 3.8–5.2)
RBC MORPH BLD: ABNORMAL
SERVICE CMNT-IMP: ABNORMAL
SERVICE CMNT-IMP: ABNORMAL
SODIUM SERPL-SCNC: 136 MMOL/L (ref 136–145)
WBC # BLD AUTO: 19.9 K/UL (ref 3.6–11)

## 2021-08-10 PROCEDURE — 74011250637 HC RX REV CODE- 250/637: Performed by: INTERNAL MEDICINE

## 2021-08-10 PROCEDURE — 80069 RENAL FUNCTION PANEL: CPT

## 2021-08-10 PROCEDURE — 94640 AIRWAY INHALATION TREATMENT: CPT

## 2021-08-10 PROCEDURE — 74011000250 HC RX REV CODE- 250: Performed by: INTERNAL MEDICINE

## 2021-08-10 PROCEDURE — 74011250636 HC RX REV CODE- 250/636: Performed by: INTERNAL MEDICINE

## 2021-08-10 PROCEDURE — 94618 PULMONARY STRESS TESTING: CPT

## 2021-08-10 PROCEDURE — 83735 ASSAY OF MAGNESIUM: CPT

## 2021-08-10 PROCEDURE — 82962 GLUCOSE BLOOD TEST: CPT

## 2021-08-10 PROCEDURE — 74011250637 HC RX REV CODE- 250/637: Performed by: HOSPITALIST

## 2021-08-10 PROCEDURE — 85025 COMPLETE CBC W/AUTO DIFF WBC: CPT

## 2021-08-10 PROCEDURE — 36415 COLL VENOUS BLD VENIPUNCTURE: CPT

## 2021-08-10 RX ORDER — PREDNISONE 20 MG/1
TABLET ORAL
Qty: 21 TABLET | Refills: 0 | Status: SHIPPED | OUTPATIENT
Start: 2021-08-10 | End: 2021-08-19

## 2021-08-10 RX ORDER — CLOPIDOGREL BISULFATE 75 MG/1
75 TABLET ORAL DAILY
Qty: 30 TABLET | Refills: 1 | Status: SHIPPED | OUTPATIENT
Start: 2021-08-11 | End: 2022-04-04

## 2021-08-10 RX ORDER — ISOSORBIDE MONONITRATE 30 MG/1
30 TABLET, EXTENDED RELEASE ORAL DAILY
Qty: 30 TABLET | Refills: 1 | Status: SHIPPED | OUTPATIENT
Start: 2021-08-11

## 2021-08-10 RX ADMIN — CLOPIDOGREL BISULFATE 75 MG: 75 TABLET, FILM COATED ORAL at 08:37

## 2021-08-10 RX ADMIN — ASPIRIN 81 MG: 81 TABLET, CHEWABLE ORAL at 08:36

## 2021-08-10 RX ADMIN — PAROXETINE HYDROCHLORIDE 20 MG: 20 TABLET, FILM COATED ORAL at 08:36

## 2021-08-10 RX ADMIN — BUDESONIDE 1000 MCG: 0.5 SUSPENSION RESPIRATORY (INHALATION) at 07:36

## 2021-08-10 RX ADMIN — ARFORMOTEROL TARTRATE 15 MCG: 15 SOLUTION RESPIRATORY (INHALATION) at 07:36

## 2021-08-10 RX ADMIN — METHYLPREDNISOLONE SODIUM SUCCINATE 40 MG: 40 INJECTION, POWDER, FOR SOLUTION INTRAMUSCULAR; INTRAVENOUS at 08:37

## 2021-08-10 RX ADMIN — IPRATROPIUM BROMIDE AND ALBUTEROL SULFATE 3 ML: .5; 3 SOLUTION RESPIRATORY (INHALATION) at 07:31

## 2021-08-10 RX ADMIN — FAMOTIDINE 20 MG: 20 TABLET ORAL at 08:36

## 2021-08-10 RX ADMIN — Medication 10 ML: at 06:00

## 2021-08-10 RX ADMIN — GUAIFENESIN 600 MG: 600 TABLET ORAL at 08:36

## 2021-08-10 RX ADMIN — ENOXAPARIN SODIUM 40 MG: 40 INJECTION SUBCUTANEOUS at 08:37

## 2021-08-10 NOTE — DISCHARGE SUMMARY
Hospitalist Discharge Summary     Patient ID:    Valdez cMmanus  [id number]  62 y.o.  1960    Admit date of service: 8/4/2021    Discharge date of service: 8/10/2021    Admission Diagnoses: NSTEMI (non-ST elevated myocardial infarction) [facility]) [I21.4]  Chest pain [R07.9]  Elevated brain natriuretic peptide (BNP) level [R79.89]  Dyspnea [R06.00]    Chronic Diagnoses:    Problem List as of 8/10/2021 Date Reviewed: 8/1/2021        Codes Class Noted - Resolved    Patent foramen ovale ICD-10-CM: Q21.1  ICD-9-CM: 745.5  6/5/2020 - Present        ASD (atrial septal defect) ICD-10-CM: Q21.1  ICD-9-CM: 745.5  6/5/2020 - Present        Dyspnea ICD-10-CM: R06.00  ICD-9-CM: 786.09  8/4/2021 - Present        Elevated brain natriuretic peptide (BNP) level ICD-10-CM: R79.89  ICD-9-CM: 790.99  8/4/2021 - Present        DM (diabetes mellitus), type 2 ([facility] ICD-10-CM: E11.9  ICD-9-CM: 250.00  8/4/2021 - Present        COPD with acute exacerbation ([facility] ICD-10-CM: J44.1  ICD-9-CM: 491.21  8/4/2021 - Present        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  ICD-9-CM: 427.69  5/5/2020 - Present        Weakness of right upper extremity ICD-10-CM: R29.898  ICD-9-CM: 729.89  4/5/2020 - Present        Hypothyroidism ICD-10-CM: E03.9  ICD-9-CM: 244.9  4/3/2020 - Present        Dyslipidemia ICD-10-CM: E78.5  ICD-9-CM: 272.4  4/3/2020 - Present        COPD (chronic obstructive pulmonary disease) ([facility] ICD-10-CM: J44.9  ICD-9-CM: 242  4/3/2020 - Present        HTN (hypertension), benign ICD-10-CM: I10  ICD-9-CM: 401.1  4/3/2020 - Present        Acute CVA (cerebrovascular accident) ([facility] ICD-10-CM: I63.9  ICD-9-CM: 434.91  4/3/2020 - Present        PFO (patent foramen ovale) ICD-10-CM: Q21.1  ICD-9-CM: 745.5  4/3/2020 - Present        RESOLVED: Chest pain ICD-10-CM: R07.9  ICD-9-CM: 786.50  8/4/2021 - 8/10/2021        RESOLVED: NSTEMI (non-ST elevated myocardial infarction) ([facility] ICD-10-CM: I21.4  ICD-9-CM: 410.70  8/4/2021 - 8/10/2021 RESOLVED: Acute respiratory insufficiency ICD-10-CM: R06.89  ICD-9-CM: 518.82  8/4/2021 - 8/10/2021              Discharge Medications:   Current Discharge Medication List      START taking these medications    Details   clopidogreL (PLAVIX) 75 mg tab Take 1 Tablet by mouth daily. Qty: 30 Tablet, Refills: 1      isosorbide mononitrate ER (IMDUR) 30 mg tablet Take 1 Tablet by mouth daily. Qty: 30 Tablet, Refills: 1      predniSONE (DELTASONE) 20 mg tablet Take 40 mg by mouth two (2) times a day for 3 days, THEN 40 mg daily for 3 days, THEN 20 mg daily for 3 days. Qty: 21 Tablet, Refills: 0         CONTINUE these medications which have NOT CHANGED    Details   acetaminophen (TYLENOL) 325 mg tablet Take 650 mg by mouth every six (6) hours as needed for Pain (Mild to moderate pain). montelukast (SINGULAIR) 10 mg tablet Take 10 mg by mouth every evening. metoprolol succinate (Toprol XL) 25 mg XL tablet Take 25 mg by mouth daily. traMADoL (ULTRAM) 50 mg tablet Take 50 mg by mouth every eight (8) hours as needed for Pain (Severe pain). albuterol-ipratropium (DUO-NEB) 2.5 mg-0.5 mg/3 ml nebu 3 mL by Nebulization route every four (4) hours as needed for Wheezing, Shortness of Breath or Respiratory Distress. atorvastatin (LIPITOR) 80 mg tablet Take 1 Tab by mouth nightly. Qty: 90 Tab, Refills: 2      ARIPiprazole (Abilify) 10 mg tablet Take 10 mg by mouth nightly. aspirin 81 mg chewable tablet Take 81 mg by mouth daily. PARoxetine (PaxiL) 20 mg tablet Take 20 mg by mouth daily. budesonide-formoteroL (Symbicort) 160-4.5 mcg/actuation HFAA Take 2 Puffs by inhalation two (2) times a day. albuterol (PROVENTIL HFA, VENTOLIN HFA, PROAIR HFA) 90 mcg/actuation inhaler Take 2 Puffs by inhalation every six (6) hours as needed for Wheezing. STOP taking these medications       levothyroxine (SYNTHROID) 125 mcg tablet Comments:   Reason for Stopping:                Follow up Care:    1. Erick Moya NP in 1-2 weeks  2. Pulmonary     Diet:  Cardiac Diet    Disposition:  Home. Advanced Directive:    Discharge Exam:  See today's note. CONSULTATIONS: Pulmonary/Intensive care    Significant Diagnostic Studies:   Recent Labs     08/10/21  0142 08/09/21  0045   WBC 19.9* 16.5*   HGB 15.8 14.0   HCT 48.6* 42.6    287     Recent Labs     08/10/21  0142 08/09/21  0045 08/08/21  0426    140 141   K HEMOLYZED,RECOLLECT REQUESTED 4.4 4.4    104 106   CO2 28 32 32   BUN 25* 24* 15   CREA 0.79 0.72 0.60   * 157* 172*   CA 8.2* 8.3* 8.5   MG HEMOLYZED,RECOLLECT REQUESTED 2.3 2.1   PHOS 2.7 3.4 3.9     Recent Labs     08/10/21  0142 08/09/21  0045 08/08/21  0426   ALB 3.1* 3.2* 3.0*     No results for input(s): INR, PTP, APTT, INREXT in the last 72 hours. No results for input(s): FE, TIBC, PSAT, FERR in the last 72 hours. No results for input(s): PH, PCO2, PO2 in the last 72 hours. No results for input(s): CPK, CKMB in the last 72 hours. No lab exists for component: TROPONINI  Lab Results   Component Value Date/Time    Glucose (POC) 132 (H) 08/10/2021 11:02 AM    Glucose (POC) 141 (H) 08/10/2021 06:45 AM    Glucose (POC) 106 08/09/2021 09:42 PM    Glucose (POC) 103 08/09/2021 04:10 PM    Glucose (POC) 115 08/09/2021 02:04 PM             HOSPITAL COURSE & TREATMENT RENDERED:   1. COPD exacerbation: severe, improving slowly. Wheezing better. tapering dose fo prednisone. Cont duo-nebs, ICS/LABA, doxy, Singular. Pulmonology following     2. NSTEMI: LHC on 8/5 showed abnormal coronary circulation with no true Lcx, trifurcating diagonal and moderate mid into distal LAD disease. Cardiology recommends DAPT, statin, BB. ISMN.    3. HTN (hypertension), benign: BP controlled. Cont metoprolol and ISMN     4.  Hypothyroidism: TSH undetectable.  FT4 on upper range of normal. HOLDING LT4. FU with PCP     5.  DM (diabetes mellitus), type 2: not listing meds, she does not appear to have DM with A1c 6.0%. BG high on steroids so okay to cont SSI for now     6.  Depression: cont Paxil     7.  Tobacco abuse: 1/2 ppd active smoker. Smoking cessation advised and would be crucial for her        Discharged in improved condition.     Spent 35 minutes    Signed:  Jasmin Bay MD  8/10/2021  11:25 AM

## 2021-08-10 NOTE — PROGRESS NOTES
08/10/21 1009   Resting (Room Air)   SpO2 95   HR 92   During Walk (Room Air)   SpO2 94   HR 95   Walk/Assistance Device   (None)   Rate of Dyspnea 1   After Walk   SpO2 93   HR 97   O2 Device Other (comment)  (Room air)   Does the Patient Qualify for Home O2 No

## 2021-08-10 NOTE — PROGRESS NOTES
Orlando Mcbride [facility]  [address] [facility] [place], [place], [address] [place] [address]  [phone]      Medical Progress Note      NAME: Theodora Emery   :  1960  MRM:  [id number]    Date of service: 8/10/2021  11:06 AM       Assessment and Plan:   1. COPD exacerbation: severe, improving slowly. Wheezing better. Wean IV steroids. Cont duo-nebs, ICS/LABA, doxy, Singular. Pulmonology following    2. NSTEMI: LHC on  showed abnormal coronary circulation with no true Lcx, trifurcating diagonal and moderate mid into distal LAD disease. Cardiology recommends DAPT, statin, BB. ISMN.    3. HTN (hypertension), benign: BP controlled. Cont metoprolol and ISMN     4.  Hypothyroidism: TSH undetectable. FT4 on upper range of normal. HOLDING LT4. FU with PCP     5. DM (diabetes mellitus), type 2: not listing meds, she does not appear to have DM with A1c 6.0%. BG high on steroids so okay to cont SSI for now     6. Depression: cont Paxil     7. Tobacco abuse: 1/2 ppd active smoker. Smoking cessation advised and would be crucial for her                  Subjective:     Chief Complaint[de-identified] Patient was seen and examined as a follow up for COPD exacerbation. Chart was reviewed. much better     ROS:  (bold if positive, if negative)    Tolerating PT  Tolerating Diet        Objective:     Last 24hrs VS reviewed since prior progress note.  Most recent are:    Visit Vitals  /67 (BP 1 Location: Right arm, BP Patient Position: Semi fowlers)   Pulse 70   Temp 98.3 °F (36.8 °C)   Resp 17   Ht 5' (1.524 m)   Wt 61.7 kg (136 lb)   SpO2 92%   BMI 26.56 kg/m²     SpO2 Readings from Last 6 Encounters:   08/10/21 92%   21 94%   20 96%   20 97%   20 97%   20 97%    O2 Flow Rate (L/min): 2 l/min       Intake/Output Summary (Last 24 hours) at 8/10/2021 1106  Last data filed at 2021 1811  Gross per 24 hour   Intake 240 ml   Output --   Net 240 ml        Physical Exam:    Gen:  Well-developed, well-nourished, in no acute distress  HEENT:  Pink conjunctivae, PERRL, hearing intact to voice, moist mucous membranes  Neck:  Supple, without masses, thyroid non-tender  Resp:  No accessory muscle use, BL mild wheezes   Card:  No murmurs, normal S1, S2 without thrills, bruits or peripheral edema  Abd:  Soft, non-tender, non-distended, normoactive bowel sounds are present, no palpable organomegaly and no detectable hernias  Lymph:  No cervical or inguinal adenopathy  Musc:  No cyanosis or clubbing  Skin:  No rashes or ulcers, skin turgor is good  Neuro:  Cranial nerves are grossly intact, no focal motor weakness, follows commands appropriately  Psych:  Good insight, oriented to person, place and time, alert  __________________________________________________________________  Medications Reviewed: (see below)  Medications:     Current Facility-Administered Medications   Medication Dose Route Frequency    methylPREDNISolone (PF) (SOLU-MEDROL) injection 40 mg  40 mg IntraVENous Q12H    enoxaparin (LOVENOX) injection 40 mg  40 mg SubCUTAneous Q24H    metoprolol succinate (TOPROL-XL) XL tablet 25 mg  25 mg Oral DAILY    arformoteroL (BROVANA) neb solution 15 mcg  15 mcg Nebulization BID RT    albuterol-ipratropium (DUO-NEB) 2.5 MG-0.5 MG/3 ML  3 mL Nebulization Q6H RT    isosorbide mononitrate ER (IMDUR) tablet 30 mg  30 mg Oral DAILY    famotidine (PEPCID) tablet 20 mg  20 mg Oral DAILY    insulin lispro (HUMALOG) injection   SubCUTAneous AC&HS    montelukast (SINGULAIR) tablet 10 mg  10 mg Oral QPM    PARoxetine (PAXIL) tablet 20 mg  20 mg Oral DAILY    naloxone (NARCAN) injection 0.4 mg  0.4 mg IntraVENous EVERY 2 MINUTES AS NEEDED    budesonide (PULMICORT) 500 mcg/2 ml nebulizer suspension  1,000 mcg Nebulization BID RT    guaiFENesin ER (MUCINEX) tablet 600 mg  600 mg Oral Q12H    sodium chloride (NS) flush 5-40 mL  5-40 mL IntraVENous Q8H    sodium chloride (NS) flush 5-40 mL  5-40 mL IntraVENous PRN    sodium chloride (NS) flush 5-40 mL  5-40 mL IntraVENous Q8H    sodium chloride (NS) flush 5-40 mL  5-40 mL IntraVENous PRN    aspirin chewable tablet 81 mg  81 mg Oral DAILY    clopidogreL (PLAVIX) tablet 75 mg  75 mg Oral DAILY    atorvastatin (LIPITOR) tablet 80 mg  80 mg Oral QHS    acetaminophen (TYLENOL) tablet 650 mg  650 mg Oral Q4H PRN    oxyCODONE IR (ROXICODONE) tablet 5 mg  5 mg Oral Q4H PRN    LORazepam (ATIVAN) injection 1 mg  1 mg IntraVENous Q6H PRN    melatonin (rapid dissolve) tablet 5 mg  5 mg Oral QHS PRN    albuterol (PROVENTIL VENTOLIN) nebulizer solution 2.5 mg  2.5 mg Nebulization Q4H PRN    glucose chewable tablet 16 g  4 Tablet Oral PRN    dextrose (D50W) injection syrg 12.5-25 g  25-50 mL IntraVENous PRN    glucagon (GLUCAGEN) injection 1 mg  1 mg IntraMUSCular PRN    [Held by provider] levothyroxine (SYNTHROID) tablet 125 mcg  125 mcg Oral 6am    morphine injection 2 mg  2 mg IntraVENous Q3H PRN     Facility-Administered Medications Ordered in Other Encounters   Medication Dose Route Frequency    sodium chloride (NS) flush 10 mL  10 mL IntraVENous PRN        Lab Data Reviewed: (see below)  Lab Review:     Recent Labs     08/10/21  0142 08/09/21  0045 08/08/21  0426   WBC 19.9* 16.5* 11.8*   HGB 15.8 14.0 13.3   HCT 48.6* 42.6 40.1    287 252     Recent Labs     08/10/21  0142 08/09/21  0045 08/08/21  0426    140 141   K HEMOLYZED,RECOLLECT REQUESTED 4.4 4.4    104 106   CO2 28 32 32   * 157* 172*   BUN 25* 24* 15   CREA 0.79 0.72 0.60   CA 8.2* 8.3* 8.5   MG HEMOLYZED,RECOLLECT REQUESTED 2.3 2.1   PHOS 2.7 3.4 3.9   ALB 3.1* 3.2* 3.0*     Lab Results   Component Value Date/Time    Glucose (POC) 132 (H) 08/10/2021 11:02 AM    Glucose (POC) 141 (H) 08/10/2021 06:45 AM    Glucose (POC) 106 08/09/2021 09:42 PM    Glucose (POC) 103 08/09/2021 04:10 PM    Glucose (POC) 115 08/09/2021 02:04 PM     No results for input(s): PH, PCO2, PO2, HCO3, FIO2 in the last 72 hours. No results for input(s): INR, INREXT in the last 72 hours. All Micro Results     Procedure Component Value Units Date/Time    CULTURE, RESPIRATORY/SPUTUM/BRONCH [facility] [[id number]]     Order Status: Sent Specimen: Sputum     COVID-19 RAPID TEST [[id number]] Collected: 08/04/21 1820    Order Status: Completed Specimen: Nasopharyngeal Updated: 08/04/21 1857     Specimen source Nasopharyngeal        COVID-19 rapid test Not detected        Comment: Rapid Abbott ID Now       Rapid NAAT:  The specimen is NEGATIVE for SARS-CoV-2, the novel coronavirus associated with COVID-19. Negative results should be treated as presumptive and, if inconsistent with clinical signs and symptoms or necessary for patient management, should be tested with an alternative molecular assay. Negative results do not preclude SARS-CoV-2 infection and should not be used as the sole basis for patient management decisions. This test has been authorized by the FDA under an Emergency Use Authorization (EUA) for use by authorized laboratories. Fact sheet for Healthcare Providers: ConventionUpdate.co.nz  Fact sheet for Patients: ConventionUpdate.co.nz       Methodology: Isothermal Nucleic Acid Amplification               I have reviewed notes of prior 24hr. Other pertinent lab: Total time spent with patient: 28 I personally reviewed chart, notes, data and current medications in the medical record. I have personally examined and treated the patient at bedside during this period.                  Care Plan discussed with: Patient, Nursing Staff and >50% of time spent in counseling and coordination of care    Discussed:  Care Plan    Prophylaxis:  Lovenox    Disposition:  Home w/Family           ___________________________________________________    Attending Physician: Kvng Armanod MD

## 2021-08-10 NOTE — PROGRESS NOTES
8/10/2021 12:31 PM CM called and scheduled for pt's PCP appt for first available for 8/19 at 3:40PM. Met with pt and relayed appt also added to pt's avs.   CM faxed pt's discharge summary to pt's PCP's office at [phone].     8/10/2021  12:12 PM Walking oximetry test results noted, no need for home O2. Pt to discharge home with family today, no further discharge needs identified. IVIS Noel     Care Management Interventions  PCP Verified by CM: Yes  Mode of Transport at Discharge:  Other (see comment) (son)  Discharge Durable Medical Equipment:  (Has sp cane, quad cane and RW at home)  Current Support Network: Own Home, Lives Alone, Family Lives Nearby (1 level house with 2 entry steps)  Discharge Location  Discharge Placement: Home with family assistance

## 2021-08-10 NOTE — DISCHARGE INSTRUCTIONS
Patient Education        Heart Attack: Care InstructionsACUTE DIAGNOSES:  NSTEMI (non-ST elevated myocardial infarction) ([facility] [I21.4]  Chest pain [R07.9]  Elevated brain natriuretic peptide (BNP) level [R79.89]  Dyspnea [R06.00]    CHRONIC MEDICAL DIAGNOSES:  Problem List as of 8/10/2021 Date Reviewed: 8/1/2021        Codes Class Noted - Resolved    Patent foramen ovale ICD-10-CM: Q21.1  ICD-9-CM: 745.5  6/5/2020 - Present        ASD (atrial septal defect) ICD-10-CM: Q21.1  ICD-9-CM: 745.5  6/5/2020 - Present        Dyspnea ICD-10-CM: R06.00  ICD-9-CM: 786.09  8/4/2021 - Present        Elevated brain natriuretic peptide (BNP) level ICD-10-CM: R79.89  ICD-9-CM: 790.99  8/4/2021 - Present        DM (diabetes mellitus), type 2 ([facility] ICD-10-CM: E11.9  ICD-9-CM: 250.00  8/4/2021 - Present        COPD with acute exacerbation ([facility] ICD-10-CM: J44.1  ICD-9-CM: 491.21  8/4/2021 - Present        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  ICD-9-CM: 427.69  5/5/2020 - Present        Weakness of right upper extremity ICD-10-CM: R29.898  ICD-9-CM: 729.89  4/5/2020 - Present        Hypothyroidism ICD-10-CM: E03.9  ICD-9-CM: 244.9  4/3/2020 - Present        Dyslipidemia ICD-10-CM: E78.5  ICD-9-CM: 272.4  4/3/2020 - Present        COPD (chronic obstructive pulmonary disease) ([facility] ICD-10-CM: J44.9  ICD-9-CM: 919  4/3/2020 - Present        HTN (hypertension), benign ICD-10-CM: I10  ICD-9-CM: 401.1  4/3/2020 - Present        Acute CVA (cerebrovascular accident) ([address] ICD-10-CM: I63.9  ICD-9-CM: 434.91  4/3/2020 - Present        PFO (patent foramen ovale) ICD-10-CM: Q21.1  ICD-9-CM: 745.5  4/3/2020 - Present        RESOLVED: Chest pain ICD-10-CM: R07.9  ICD-9-CM: 786.50  8/4/2021 - 8/10/2021        RESOLVED: NSTEMI (non-ST elevated myocardial infarction) ([facility] ICD-10-CM: I21.4  ICD-9-CM: 410.70  8/4/2021 - 8/10/2021        RESOLVED: Acute respiratory insufficiency ICD-10-CM: R06.89  ICD-9-CM: 518.82  8/4/2021 - 8/10/2021 DISCHARGE MEDICATIONS:          · It is important that you take the medication exactly as they are prescribed. · Keep your medication in the bottles provided by the pharmacist and keep a list of the medication names, dosages, and times to be taken in your wallet. · Do not take other medications without consulting your doctor. Holding synthroid as her TSH is 0. May need to check her TSH     DIET:  Cardiac Diet    ACTIVITY: Activity as tolerated    ADDITIONAL INFORMATION: If you experience any of the following symptoms then please call your primary care physician or return to the emergency room if you cannot get hold of your doctor: Fever, chills, nausea, vomiting, diarrhea, change in mentation, falling, bleeding, shortness of breath. FOLLOW UP CARE:  Dr. Carlos Marinelli, [facility]  you are to call and set up an appointment to see them in 5 days. Follow-up with cardiology and pulmonary      Information obtained by :  I understand that if any problems occur once I am at home I am to contact my physician. I understand and acknowledge receipt of the instructions indicated above. Physician's or R.N.'s Signature                                                                  Date/Time                                                                                                                                              Patient or Representative Signature                                                          Date/Time    Overview     A heart attack is an event that occurs when part of the heart muscle does not get enough blood and oxygen. This part of the heart starts to die. A heart attack is also called a myocardial infarction, or MI. A heart attack most often happens because blood flow through one or more of the coronary arteries is blocked.  This blockage is usually caused by a blood clot that forms when plaque in the artery breaks open. After a heart attack, you may be worried about your future. Over the next several weeks, your heart will start to heal. Though it can be hard to break old habits, you can reduce your risk of having another heart attack. You can do this by making some lifestyle changes and by taking medicines. Follow-up care is a key part of your treatment and safety. Be sure to make and go to all appointments, and call your doctor if you are having problems. It's also a good idea to know your test results and keep a list of the medicines you take. How can you care for yourself at home? Activity    · Until your doctor says it is okay, do not do strenuous exercise. And do not lift, pull, or push anything heavy. Ask your doctor what types of activities are safe for you.     · If your doctor has not set you up with a cardiac rehabilitation (rehab) program, talk to him or her about whether that is right for you. Cardiac rehab includes supervised exercise. It also includes help with diet and lifestyle changes and emotional support. It may reduce your risk of future heart problems.     · Increase your activities slowly. Take short rest breaks when you get tired.     · If your doctor recommends it, get more exercise. Walking is a good choice. Bit by bit, increase the amount you walk every day. Try for at least 30 minutes on most days of the week. You also may want to swim, bike, or do other activities. Talk with your doctor before you start an exercise program to make sure it is safe for you.     · Ask your doctor when you can drive, go back to work, and do other daily activities again.     · You can have sex as soon as you feel ready for it. Often this means when you can easily walk around or climb stairs. Talk with your doctor if you have any concerns.  If you are taking nitroglycerin, do not take erection-enhancing medicine such as sildenafil (Viagra), tadalafil (Cialis), or vardenafil (Levitra) . Lifestyle changes    · Do not smoke. Smoking increases your risk of another heart attack. If you need help quitting, talk to your doctor about stop-smoking programs and medicines. These can increase your chances of quitting for good.     · Eat a heart-healthy diet that is low in saturated fat and salt, and is full of fruits, vegetables and whole-grains. You may get more details about how to eat healthy. But these tips can help you get started.     · Stay at a healthy weight, or lose weight if you need to. Medicines    · Be safe with medicines. Take your medicines exactly as prescribed. Call your doctor if you think you are having a problem with your medicine. You will get more details on the specific medicines your doctor prescribes. Do not stop taking your medicine unless your doctor tells you to. Not taking your medicine might raise your risk of having another heart attack.     · You may need several medicines to help lower your risk of another heart attack. These include:  ? Blood pressure medicines such as angiotensin-converting enzyme (ACE) inhibitors, ARBs (angiotensin II receptor blockers), and beta-blockers. ? Cholesterol medicine called statins. ? Aspirin and other blood thinners. These prevent blood clots that can cause a heart attack.     · If your doctor has given you nitroglycerin, keep it with you at all times. If you have angina symptoms, such as chest pain or pressure, sit down and rest. Take the first dose of nitroglycerin as directed. If symptoms get worse or are not getting better within 5 minutes, call 911 right away. Stay on the phone. The emergency  will tell you what to do.     · Do not take any over-the-counter medicines, vitamins, or herbal products without talking to your doctor first.   Staying healthy    · Manage other health conditions such as high blood pressure and diabetes.     · Avoid colds and flu.  Get a pneumococcal vaccine shot. If you have had one before, ask your doctor whether you need another dose. Get the flu vaccine every year. If you must be around people with colds or flu, wash your hands often.     · Be sure to tell your doctor about any angina symptoms you have had, even if they went away. Pay attention to your angina symptoms. Know what is typical for you and learn how to control it. Know when to call for help.     · Talk to your family, friends, or a counselor about your feelings. It is normal to feel frightened, angry, hopeless, helpless, and even guilty. Talking openly about bad feelings can help you cope. If you have symptoms of depression, talk to your doctor. When should you call for help? Call 911 anytime you think you may need emergency care. For example, call if:    · You have symptoms of a heart attack. These may include:  ? Chest pain or pressure, or a strange feeling in the chest.  ? Sweating. ? Shortness of breath. ? Nausea or vomiting. ? Pain, pressure, or a strange feeling in the back, neck, jaw, or upper belly or in one or both shoulders or arms. ? Lightheadedness or sudden weakness. ? A fast or irregular heartbeat. After you call 911, the  may tell you to chew 1 adult-strength or 2 to 4 low-dose aspirin. Wait for an ambulance. Do not try to drive yourself.     · You have angina symptoms (such as chest pain or pressure) that do not go away with rest or are not getting better within 5 minutes after you take a dose of nitroglycerin.     · You passed out (lost consciousness).     · You feel like you are having another heart attack. Call your doctor now or seek immediate medical care if:    · You are having angina symptoms, such as chest pain or pressure, more often than usual, or the symptoms are different or worse than usual.     · You have new or increased shortness of breath.     · You are dizzy or lightheaded, or you feel like you may faint.    Watch closely for changes in your health, and be sure to contact your doctor if you have any problems. Where can you learn more? Go to http://www.gray.com/  Enter H564 in the search box to learn more about \"Heart Attack: Care Instructions. \"  Current as of: August 31, 2020               Content Version: 12.8  © 1722-4643 [employer]. Care instructions adapted under license by [employer] (which disclaims liability or warranty for this information). If you have questions about a medical condition or this instruction, always ask your healthcare professional. [place] [address] any warranty or liability for your use of this information. Radial Cardiac Catheterization/Angiography Discharge Instructions   It is normal to feel tired the first couple days. Take it easy and follow the physicians instructions. CHECK THE CATHETER INSERTION SITE DAILY:   Remove the wrist dressing 24 hours after the procedure. You may shower 24 hours after the procedure. Wash with soap and water and pat dry. Gentle cleaning of the site with soap and water is sufficient, cover with a dry clean dressing or bandage. Do not apply creams or powders to the area. No soaking the wrist for 3 days. Leave the puncture site open to air after 24 hours post-procedure. CALL THE PHYSICIANS:   If the site becomes red, swollen or feels warm to the touch   If there is bleeding or drainage or if there is unusual pain at the radial site. If there is any minor oozing, you may apply a band-aid and remove after 12 hours. If the bleeding continues, hold pressure with the middle finger against the puncture site and the thumb against the back of the wrist,call 911 to be transported to the hospital.   DO NOT DRIVE YOURSELF, [place] [address].    ACTIVITY:   For the first 24 hours do not manipulate the wrist.   No lifting, pushing or pulling over 3-5 pounds with the affected wrist for 7 daysand no straining the insertion site. Do not life grocery bags or the garbage can, do not run the vacuum  or  for 7 days. Start with short walks as in the hospital and gradually increase as tolerated each day. It is recommended to walk 30 minutes 5-7 days per week. Follow your physicians instructions on activity. Avoid walking outside in extremes of heat or cold. Walk inside when it is cold and windy or hot and humid. Things to keep in mind:   No driving for at least 24 hours, or as designated by your physician. Limit the number of times you go up and down the stairs   Take rests and pace yourself with activity. Be careful and do not strain with bowel movements. MEDICATIONS:   Take all medications as prescribed   Call your physician if you have any questions   Keep an updated list of your medications with you at all times and give a list to your physician and pharmacist   SIGNS AND SYMPTOMS:   Be cautious of symptoms of angina or recurrent symptoms such as chest discomfort, unusual shortness of breath or fatigue. These could be symptoms of restenosis, a new blockage or a heart attack. If your symptoms are relieved with rest it is still recommended that you notify your physician of recurrent chest pain or discomfort. For CHEST PAIN or symptoms of angina not relieved with rest: If the discomfort is not relieved with rest, and you have been prescribed Nitroglycerin, take as directed (taken under the tongue, one at a time 5 minutes apart for a total of 3 doses). If the discomfort is not relieved after the 3rd nitroglycerin, call 911. If you have not been prescribed Nitroglycerin and your chest discomfort is not relieved with rest, call 911. AFTER CARE:   Follow up with your physician as instructed. Follow a heart healthy diet with proper portion control, daily stress management, daily exercise, blood pressure and cholesterol control , and smoking cessation.           Contact Information for Outpatient Cardiac Rehab Program near Patient's home in [address] [place] [address] Street:  [facility]   Cardiac Rehabilitation  [address] [place] [address]  [facility] Tel: [phone]: [phone]

## 2021-08-11 NOTE — PROGRESS NOTES
Discussed discharge information with patient. The patient verbalized understanding. Patient aware that prescriptions have been electronically sent to their pharmacy. Opportunity for questions and clarification offered. Removed patient's IV access with no complications. Vital signs stable. Patient sent with all belongings.

## 2021-08-19 ENCOUNTER — TELEPHONE (OUTPATIENT)
Dept: CARDIOLOGY CLINIC | Age: 61
End: 2021-08-19

## 2021-08-19 NOTE — TELEPHONE ENCOUNTER
FYRICHARD-Granddaughter concerned about pt - had problems with weakness and numbness yesterday and does not feel any better today. Has a stroke hx and recent d/c from hospital. She will be seeing PCP today - informed her that pt may need to go to ER - she lives in [place].

## 2021-08-23 NOTE — PROGRESS NOTES
CARDIOLOGY OFFICE NOTE    Stanley Cummins MD, [address], [place], [address] [facility]  Phone [phone]; Fax [phone]  Mobile [phone]   Voice Mail [phone]    LAST OFFICE VISIT : 8/28/2020  Rachel Reis NP       ATTENTION:   This medical record was transcribed using an electronic medical records/speech recognition system. Although proofread, it may and can contain electronic, spelling and other errors. Corrections may be executed at a later time. Please feel free to contact us for any clarifications as needed. ICD-10-CM ICD-9-CM   1. ASD (atrial septal defect)  Q21.1 745.5   2. PFO (patent foramen ovale)  Q21.1 745.5   3. Essential hypertension  I10 401.9   4. PVC (premature ventricular contraction)  I49.3 427.69   5. Dyslipidemia  E78.5 272.4   6. Chronic obstructive pulmonary disease, unspecified COPD type [facility])  J44.9 [address] Anna Franco is a 62 y.o. female with  referred for   PFO hypertension and dyslipidemia  . presyncope or fatigue. Cardiac risk factors: smoking/ tobacco exposure, dyslipidemia, hypertension, post-menopausal  I have personally obtained the history from the patient. HISTORY OF PRESENTING ILLNESS   Previous note:  Mrs. Anna Franco was followed by Dr. Manisha Morales in the past she is a 81-year-old lady who has a history of a left frontoparietal stroke with dysarthria and right arm weakness. She did have a MRI of her head that showed a left frontoparietal infarct suggestive of embolic etiology. She had an echo at that time that showed a significant patent foramen ovale that was patent felt to be cardioembolic. She subsequently underwent a PFO closure on 6/5/2020. This was performed by Dr. Cady Welsh. At that time she stopped smoking    Overall she is doing well she was recently hospitalized underwent cardiac catheterization for a slight bump in her troponin.   Troponin went up to about 2.37 cath revealed LAD lesion distally about 60%. No intervention was done it was felt that this was not the etiology of the jump in her troponin. She continues to smoke and I told her it would be important to prevent disease progression in her LAD and I showed them the images from a cardiac catheterization where the lesion was. Is complaining of some tingling in her hand where she had her stroke I suggest she see her primary care regarding this.   Went over her LDL with her and that is not at goal should be under 70 in order to switch her medicines around.          ACTIVE PROBLEM LIST     Patient Active Problem List    Diagnosis Date Noted    Patent foramen ovale 06/05/2020    ASD (atrial septal defect) 06/05/2020    Dyspnea 08/04/2021    Elevated brain natriuretic peptide (BNP) level 08/04/2021    DM (diabetes mellitus), type 2 ([address] 08/04/2021    COPD with acute exacerbation ([address] 08/04/2021    Multifocal PVCs with pairing 05/05/2020    Weakness of right upper extremity 04/05/2020    Hypothyroidism 04/03/2020    Dyslipidemia 04/03/2020    COPD (chronic obstructive pulmonary disease) ([address] 04/03/2020    HTN (hypertension), benign 04/03/2020    Acute CVA (cerebrovascular accident) ([address] 04/03/2020    PFO (patent foramen ovale) 04/03/2020           PAST MEDICAL HISTORY     Past Medical History:   Diagnosis Date    Chronic obstructive pulmonary disease ([address]     Diabetes ([address]     Borderline    Hypercholesteremia     Hypertension     Stroke ([address]     Thyroid disease            PAST SURGICAL HISTORY     Past Surgical History:   Procedure Laterality Date    HX APPENDECTOMY      HX CHOLECYSTECTOMY      HX GYN      hysterectomy    DE CARDIAC SURG PROCEDURE UNLIST      cath          ALLERGIES     No Known Allergies       FAMILY HISTORY     Family History   Problem Relation Age of Onset    Stroke Neg Hx     negative for cardiac disease       SOCIAL HISTORY     Social History     Socioeconomic History    Marital status: SINGLE     Spouse name: Not on file    Number of children: Not on file    Years of education: Not on file    Highest education level: Not on file   Tobacco Use    Smoking status: Current Every Day Smoker     Packs/day: 1.00    Smokeless tobacco: Never Used   Substance and Sexual Activity    Alcohol use: Never    Drug use: Never     Social Determinants of Health     Financial Resource Strain:     Difficulty of Paying Living Expenses:    Food Insecurity:     Worried About Running Out of Food in the Last Year:     Ran Out of Food in the Last Year:    Transportation Needs:     Lack of Transportation (Medical):  Lack of Transportation (Non-Medical):    Physical Activity:     Days of Exercise per Week:     Minutes of Exercise per Session:    Stress:     Feeling of Stress :    Social Connections:     Frequency of Communication with Friends and Family:     Frequency of Social Gatherings with Friends and Family:     Attends Hindu Services:     Active Member of Clubs or Organizations:     Attends Club or Organization Meetings:     Marital Status:          MEDICATIONS     Current Outpatient Medications   Medication Sig    clopidogreL (PLAVIX) 75 mg tab Take 1 Tablet by mouth daily.  isosorbide mononitrate ER (IMDUR) 30 mg tablet Take 1 Tablet by mouth daily.  acetaminophen (TYLENOL) 325 mg tablet Take 650 mg by mouth every six (6) hours as needed for Pain (Mild to moderate pain).  montelukast (SINGULAIR) 10 mg tablet Take 10 mg by mouth every evening.  traMADoL (ULTRAM) 50 mg tablet Take 50 mg by mouth every eight (8) hours as needed for Pain (Severe pain).  albuterol-ipratropium (DUO-NEB) 2.5 mg-0.5 mg/3 ml nebu 3 mL by Nebulization route every four (4) hours as needed for Wheezing, Shortness of Breath or Respiratory Distress.  atorvastatin (LIPITOR) 80 mg tablet Take 1 Tab by mouth nightly.  aspirin 81 mg chewable tablet Take 81 mg by mouth daily.     PARoxetine (PaxiL) 20 mg tablet Take 20 mg by mouth daily.  budesonide-formoteroL (Symbicort) 160-4.5 mcg/actuation HFAA Take 2 Puffs by inhalation two (2) times a day.  albuterol (PROVENTIL HFA, VENTOLIN HFA, PROAIR HFA) 90 mcg/actuation inhaler Take 2 Puffs by inhalation every six (6) hours as needed for Wheezing.  metoprolol succinate (Toprol XL) 25 mg XL tablet Take 25 mg by mouth daily. (Patient not taking: Reported on 8/24/2021)    ARIPiprazole (Abilify) 10 mg tablet Take 10 mg by mouth nightly. (Patient not taking: Reported on 8/24/2021)     No current facility-administered medications for this visit. Facility-Administered Medications Ordered in Other Visits   Medication Dose Route Frequency    sodium chloride (NS) flush 10 mL  10 mL IntraVENous PRN       I have reviewed the nurses notes, vitals, problem list, allergy list, medical history, family, social history and medications. REVIEW OF SYMPTOMS   Pertinent positives per HPI  General: Pt denies excessive weight gain or loss. Pt is able to conduct ADL's  HEENT: Denies blurred vision, headaches, hearing loss, epistaxis and difficulty swallowing. Respiratory: Denies cough, congestion, shortness of breath, KNOX, wheezing or stridor. Cardiovascular: Denies precordial pain, palpitations, edema or PND  Gastrointestinal: Denies poor appetite, indigestion, abdominal pain or blood in stool  Genitourinary: Denies hematuria, dysuria, increased urinary frequency  Musculoskeletal: Denies joint pain or swelling from muscles or joints  Neurologic: Denies tremor, paresthesias, headache, or sensory motor disturbance  Psychiatric: Denies confusion, insomnia, depression  Integumentray: Denies rash, itching or ulcers. Hematologic: Denies easy bruising, bleeding     PHYSICAL EXAMINATION      Vitals:    08/24/21 1340   BP: 134/82   Pulse: (!) 102   SpO2: 97%   Weight: 135 lb (61.2 kg)   Height: 5' (1.524 m)     General: Well developed, in no acute distress.   HEENT: No jaundice, oral mucosa moist, no oral ulcers  Neck: Supple, no stiffness, no lymphadenopathy, supple  Heart:  Normal S1/S2 negative S3 or S4. Regular, no murmur, gallop or rub, no jugular venous distention  Respiratory: Wheezing at her bases bilaterally on expiration  Extremities:  No edema, normal cap refill, no cyanosis. Musculoskeletal: No clubbing, no deformities  Neuro: A&Ox3, speech clear, gait stable, cooperative, no focal neurologic deficits  Skin: Skin color is normal. No rashes or lesions. Non diaphoretic, moist.             DIAGNOSTIC DATA     1. Neck CTA 4/2020   - moderate bilateral ICA disease (left 60%)     2. Echo   4/3/20 - EF 60%, PFO significant right to left shunt   8/31/20 - EF 60-65%. Normal cavity size and wall thickness. No WMA. 30 mm [place] Cardioform device closure device present in expected position without residual shunting. 3. Venous duplex    4/4/20 - no DVT     4. Event recorder    April 2020 - frequent PVCs, multifocal, short runs NSVT     5. Stress Test   Lexiscan cardiolite 5/1/20 - normal perfusion, EF 61%     6. Lipids   4/5/20- , HDL 43, .8,        7. PFO Closure -6/5/20   NO pulmonary HTN   Small sized Fossa ovalis defect/ASD with previously document bidirectional shunt and stroke   ICE guided closure of the ASD with 30 mm [place]field cardioform device.          LABORATORY DATA            Lab Results   Component Value Date/Time    WBC 19.9 (H) 08/10/2021 01:42 AM    HGB 15.8 08/10/2021 01:42 AM    HCT 48.6 (H) 08/10/2021 01:42 AM    PLATELET 314 33/64/8678 01:42 AM    MCV 97.6 08/10/2021 01:42 AM      Lab Results   Component Value Date/Time    Sodium 136 08/10/2021 01:42 AM    Potassium HEMOLYZED,RECOLLECT REQUESTED 08/10/2021 01:42 AM    Chloride 106 08/10/2021 01:42 AM    CO2 28 08/10/2021 01:42 AM    Anion gap 2 (L) 08/10/2021 01:42 AM    Glucose 163 (H) 08/10/2021 01:42 AM    BUN 25 (H) 08/10/2021 01:42 AM    Creatinine 0.79 08/10/2021 01:42 AM BUN/Creatinine ratio 32 (H) 08/10/2021 01:42 AM    GFR est AA >60 08/10/2021 01:42 AM    GFR est non-AA >60 08/10/2021 01:42 AM    Calcium 8.2 (L) 08/10/2021 01:42 AM    Bilirubin, total 0.2 08/04/2021 05:12 PM    Alk. phosphatase 98 08/04/2021 05:12 PM    Protein, total 7.2 08/04/2021 05:12 PM    Albumin 3.1 (L) 08/10/2021 01:42 AM    Globulin 3.6 08/04/2021 05:12 PM    A-G Ratio 1.0 (L) 08/04/2021 05:12 PM    ALT (SGPT) 26 08/04/2021 05:12 PM           ASSESSMENT/RECOMMENDATIONS:.     1) PFO complicated by left frontoparietal strokes status post closure 6/5/2020  -Okay to stop Plavix after last prescription per Dr. Ana Singh    2)COPD  -Essential going forward that she stop smoking she definitely has wheezing on expiration at the bases bilaterally    3)Complex ventricular ectopy   -Myocardial perfusion study and EF normal  -She is tolerating the metoprolol at 50 mg a day  -Followed by Dr. Maegan Mancia    4) carotid disease  -Moderate disease and goal will be to lower her LDL under 70        5) dyslipidemia LDL not at goal at 117 and if it remains elevated I would switch her Lipitor to Crestor 40  -We will recheck her cholesterol in 2 months  Lab Results   Component Value Date/Time    Cholesterol, total 188 08/05/2021 06:20 AM    HDL Cholesterol 47 08/05/2021 06:20 AM    LDL, calculated 106.6 (H) 08/05/2021 06:20 AM    VLDL, calculated 34.4 08/05/2021 06:20 AM    Triglyceride 172 (H) 08/05/2021 06:20 AM    CHOL/HDL Ratio 4.0 08/05/2021 06:20 AM         6) hypertension  -BP is reasonable at this time reduce sodium in diet and stop smoking      No orders of the defined types were placed in this encounter. We discussed the expected course, resolution and complications of the diagnosis(es) in detail. Medication risks, benefits, costs, interactions, and alternatives were discussed as indicated. I advised him to contact the office if his condition worsens, changes or fails to improve as anticipated.  He expressed understanding with the diagnosis(es) and plan          Follow-up and Dispositions  ·   Return in about 6 months (around 2/24/2022). I have discussed the diagnosis with  Sylvia Montes and the intended plan as seen in the above orders. Questions were answered concerning future plans. I have discussed medication side effects and warnings with the patient as well. Thank you,  Sally Saint, NP for involving me in the care of  Sylvai Montes. Please do not hesitate to contact me for further questions/concerns. Stanley Patterson MD, [facility] [address]      [address]      [phone] / [phone] Fax

## 2021-08-24 ENCOUNTER — OFFICE VISIT (OUTPATIENT)
Dept: CARDIOLOGY CLINIC | Age: 61
End: 2021-08-24
Payer: MEDICAID

## 2021-08-24 VITALS
DIASTOLIC BLOOD PRESSURE: 82 MMHG | SYSTOLIC BLOOD PRESSURE: 134 MMHG | HEART RATE: 102 BPM | OXYGEN SATURATION: 97 % | BODY MASS INDEX: 26.5 KG/M2 | HEIGHT: 60 IN | WEIGHT: 135 LBS

## 2021-08-24 DIAGNOSIS — I49.3 PVC (PREMATURE VENTRICULAR CONTRACTION): ICD-10-CM

## 2021-08-24 DIAGNOSIS — Q21.10 ASD (ATRIAL SEPTAL DEFECT): Primary | ICD-10-CM

## 2021-08-24 DIAGNOSIS — Q21.12 PFO (PATENT FORAMEN OVALE): ICD-10-CM

## 2021-08-24 DIAGNOSIS — E78.5 DYSLIPIDEMIA: ICD-10-CM

## 2021-08-24 DIAGNOSIS — J44.9 CHRONIC OBSTRUCTIVE PULMONARY DISEASE, UNSPECIFIED COPD TYPE (HCC): ICD-10-CM

## 2021-08-24 DIAGNOSIS — I10 ESSENTIAL HYPERTENSION: ICD-10-CM

## 2021-08-24 PROCEDURE — 99214 OFFICE O/P EST MOD 30 MIN: CPT | Performed by: SPECIALIST

## 2021-08-24 RX ORDER — ROSUVASTATIN CALCIUM 40 MG/1
40 TABLET, COATED ORAL
Qty: 30 TABLET | Refills: 5 | Status: SHIPPED | OUTPATIENT
Start: 2021-08-24

## 2021-08-24 NOTE — PROGRESS NOTES
Heydi Hall is a 62 y.o. female    Visit Vitals  /82 (BP 1 Location: Left upper arm, BP Patient Position: Sitting, BP Cuff Size: Adult)   Pulse (!) 102   Ht 5' (1.524 m)   Wt 135 lb (61.2 kg)   SpO2 97%   BMI 26.37 kg/m²       Chief Complaint   Patient presents with   [facility] Follow Up     NSTEMI       Chest pain NO  SOB NO  Dizziness NO  Swelling NO  Recent hospital visit  NGHIA BREATHING ISSUE  Refills NO  COVID VACCINE STATUS YES

## 2021-08-24 NOTE — PATIENT INSTRUCTIONS
1) stop your Lipitor which is also known as atorvastatin    2) I have started you on Crestor also known as rosuvastatin to be taking 40 mg a day    3) please have your cholesterol checked again in 2 months at [facility] fasting    4) follow-up with me in 3 to 4 months.

## 2022-01-30 NOTE — Clinical Note
Interval History:  No acute events overnight continues to be on Oxy mask 5 L. , white count 15 H&H stable.  Tube feeding at the rate of 15    Review of Systems   Unable to perform ROS: Dementia     Objective:     Vital Signs (Most Recent):  Temp: 97.9 °F (36.6 °C) (01/30/22 1130)  Pulse: 71 (01/30/22 1100)  Resp: 18 (01/30/22 1100)  BP: 126/62 (01/30/22 1100)  SpO2: 98 % (01/30/22 1100) Vital Signs (24h Range):  Temp:  [97.9 °F (36.6 °C)-99.3 °F (37.4 °C)] 97.9 °F (36.6 °C)  Pulse:  [63-89] 71  Resp:  [18-21] 18  SpO2:  [96 %-100 %] 98 %  BP: ()/(53-71) 126/62     Weight: 68.9 kg (151 lb 14.4 oz)  Body mass index is 26.07 kg/m².    Intake/Output Summary (Last 24 hours) at 1/30/2022 1517  Last data filed at 1/30/2022 1100  Gross per 24 hour   Intake 3205.82 ml   Output 1600 ml   Net 1605.82 ml      Physical Exam  Vitals and nursing note reviewed.   Constitutional:       General: She is not in acute distress.     Appearance: She is ill-appearing.   HENT:      Head: Normocephalic and atraumatic.      Mouth/Throat:      Mouth: Mucous membranes are moist.   Eyes:      Pupils: Pupils are equal, round, and reactive to light.   Cardiovascular:      Rate and Rhythm: Normal rate and regular rhythm.      Heart sounds: No murmur heard.      Pulmonary:      Effort: No respiratory distress.      Breath sounds: Normal breath sounds. No wheezing or rhonchi.   Abdominal:      General: There is no distension.      Palpations: Abdomen is soft.      Tenderness: There is no abdominal tenderness.   Skin:     Coloration: Skin is not pale.      Findings: No rash.   Neurological:      Mental Status: She is alert. She is disoriented.      Cranial Nerves: No cranial nerve deficit.         Significant Labs:   All pertinent labs within the past 24 hours have been reviewed.  BMP:   Recent Labs   Lab 01/30/22  0245   GLU 92   *   K 3.8   CL 91*   CO2 31*   BUN 18   CREATININE 0.6   CALCIUM 8.4*   MG 1.6     CBC:   Recent Labs   Lab  The physician has been notified. 01/29/22  0319 01/30/22  0245   WBC 13.55* 15.27*   HGB 8.5* 8.4*   HCT 25.1* 25.4*    325       Significant Imaging: I have reviewed all pertinent imaging results/findings within the past 24 hours.

## 2022-03-18 PROBLEM — Q21.12 PFO (PATENT FORAMEN OVALE): Status: ACTIVE | Noted: 2020-04-03

## 2022-03-19 PROBLEM — E78.5 DYSLIPIDEMIA: Status: ACTIVE | Noted: 2020-04-03

## 2022-03-19 PROBLEM — I63.9 ACUTE CVA (CEREBROVASCULAR ACCIDENT) (HCC): Status: ACTIVE | Noted: 2020-04-03

## 2022-03-19 PROBLEM — E03.9 HYPOTHYROIDISM: Status: ACTIVE | Noted: 2020-04-03

## 2022-03-19 PROBLEM — J44.1 COPD WITH ACUTE EXACERBATION (HCC): Status: ACTIVE | Noted: 2021-08-04

## 2022-03-19 PROBLEM — J44.9 COPD (CHRONIC OBSTRUCTIVE PULMONARY DISEASE) (HCC): Status: ACTIVE | Noted: 2020-04-03

## 2022-03-19 PROBLEM — I10 HTN (HYPERTENSION), BENIGN: Status: ACTIVE | Noted: 2020-04-03

## 2022-03-19 PROBLEM — R06.00 DYSPNEA: Status: ACTIVE | Noted: 2021-08-04

## 2022-03-19 PROBLEM — R29.898 WEAKNESS OF RIGHT UPPER EXTREMITY: Status: ACTIVE | Noted: 2020-04-05

## 2022-03-19 PROBLEM — Q21.10 ASD (ATRIAL SEPTAL DEFECT): Status: ACTIVE | Noted: 2020-06-05

## 2022-03-19 PROBLEM — Q21.12 PATENT FORAMEN OVALE: Status: ACTIVE | Noted: 2020-06-05

## 2022-03-20 PROBLEM — R79.89 ELEVATED BRAIN NATRIURETIC PEPTIDE (BNP) LEVEL: Status: ACTIVE | Noted: 2021-08-04

## 2022-03-20 PROBLEM — E11.9 DM (DIABETES MELLITUS), TYPE 2 (HCC): Status: ACTIVE | Noted: 2021-08-04

## 2022-03-20 PROBLEM — I49.3 MULTIFOCAL PVCS WITH PAIRING: Status: ACTIVE | Noted: 2020-05-05

## 2022-04-04 ENCOUNTER — CLINICAL SUPPORT (OUTPATIENT)
Dept: CARDIOLOGY CLINIC | Age: 62
End: 2022-04-04

## 2022-04-04 ENCOUNTER — OFFICE VISIT (OUTPATIENT)
Dept: CARDIOLOGY CLINIC | Age: 62
End: 2022-04-04
Payer: MEDICAID

## 2022-04-04 VITALS
SYSTOLIC BLOOD PRESSURE: 130 MMHG | HEIGHT: 60 IN | HEART RATE: 66 BPM | WEIGHT: 138 LBS | OXYGEN SATURATION: 96 % | BODY MASS INDEX: 27.09 KG/M2 | DIASTOLIC BLOOD PRESSURE: 88 MMHG

## 2022-04-04 DIAGNOSIS — I49.3 PVC (PREMATURE VENTRICULAR CONTRACTION): Primary | ICD-10-CM

## 2022-04-04 DIAGNOSIS — R53.82 CHRONIC FATIGUE: Primary | ICD-10-CM

## 2022-04-04 PROCEDURE — 99215 OFFICE O/P EST HI 40 MIN: CPT | Performed by: NURSE PRACTITIONER

## 2022-04-04 PROCEDURE — 93227 XTRNL ECG REC<48 HR R&I: CPT | Performed by: SPECIALIST

## 2022-04-04 PROCEDURE — 93225 XTRNL ECG REC<48 HRS REC: CPT | Performed by: SPECIALIST

## 2022-04-04 PROCEDURE — 93000 ELECTROCARDIOGRAM COMPLETE: CPT | Performed by: NURSE PRACTITIONER

## 2022-04-04 RX ORDER — PREDNISONE 20 MG/1
TABLET ORAL
COMMUNITY

## 2022-04-04 NOTE — PROGRESS NOTES
Applied 24 hr holter per Amelia Lauren NP dx: PVC. Pt has #[id number]   Chargeable visit.   [employer]

## 2022-04-04 NOTE — LETTER
4/4/2022    Patient: Tawanna Johnson   YOB: 1960   Date of Visit: 4/4/2022     Nurys Bermudez NP  [address] [address]  Via Fax: [phone]    Dear Nurys Bermudez NP,      Thank you for referring Ms. Gabbi Quigley to [facility] for evaluation. My notes for this consultation are attached. If you have questions, please do not hesitate to call me. I look forward to following your patient along with you.       Sincerely,    Hallie De La Fuente NP

## 2022-04-04 NOTE — PROGRESS NOTES
HISTORY OF PRESENT ILLNESS      Day Benoit is a 62 y.o. female with symptomatic PVCs associated with palpitations referred for further recommendations regarding management of her PVCs after monitoring demonstrated multiple multifocal PVCs with short runs of nonsustained ventricular tachycardia. Exercise nuclear stress test was unremarkable for ischemia. She was previously intolerant of metoprolol for PVCs (fatigue/dizziness). She underwent PFO closure in 6/2020 with Dr. Joanne Keller and felt that her symptoms of palpitations improved after her procedure. In 8/2021 she was hospitalized and underwent cardiac catheterization without intervention (LAD lesion distally- 60%). She has continued medical therapy, and has decided to stop smoking. She reports ongoing fatigue since her hospitalization. Echocardiogram at that time showed a decreased EF of 40%. PAST MEDICAL HISTORY     Past Medical History:   Diagnosis Date    Chronic obstructive pulmonary disease ([address]     Diabetes ([facility]     Borderline    Hypercholesteremia     Hypertension     Stroke ([facility]     Thyroid disease            PAST SURGICAL HISTORY     Past Surgical History:   Procedure Laterality Date    HX APPENDECTOMY      HX CHOLECYSTECTOMY      HX GYN      hysterectomy    [place] CARDIAC SURG PROCEDURE UNLIST      cath          ALLERGIES     No Known Allergies       FAMILY HISTORY     Family History   Problem Relation Age of Onset    Stroke Neg Hx     negative for cardiac disease       SOCIAL HISTORY     Social History     Socioeconomic History    Marital status: SINGLE   Tobacco Use    Smoking status: Current Every Day Smoker     Packs/day: 1.00    Smokeless tobacco: Never Used   Substance and Sexual Activity    Alcohol use: Never    Drug use: Never         MEDICATIONS     Current Outpatient Medications   Medication Sig    predniSONE (DELTASONE) 20 mg tablet Take  by mouth daily (with breakfast).     amoxicillin/potassium clav (AUGMENTIN PO) Take  by mouth. UNSURE OF THE DOSAGE    rosuvastatin (CRESTOR) 40 mg tablet Take 1 Tablet by mouth nightly.  isosorbide mononitrate ER (IMDUR) 30 mg tablet Take 1 Tablet by mouth daily.  acetaminophen (TYLENOL) 325 mg tablet Take 650 mg by mouth every six (6) hours as needed for Pain (Mild to moderate pain).  montelukast (SINGULAIR) 10 mg tablet Take 10 mg by mouth every evening.  traMADoL (ULTRAM) 50 mg tablet Take 50 mg by mouth every eight (8) hours as needed for Pain (Severe pain).  albuterol-ipratropium (DUO-NEB) 2.5 mg-0.5 mg/3 ml nebu 3 mL by Nebulization route every four (4) hours as needed for Wheezing, Shortness of Breath or Respiratory Distress.  aspirin 81 mg chewable tablet Take 81 mg by mouth daily.  PARoxetine (PaxiL) 20 mg tablet Take 20 mg by mouth daily.  albuterol (PROVENTIL HFA, VENTOLIN HFA, PROAIR HFA) 90 mcg/actuation inhaler Take 2 Puffs by inhalation every six (6) hours as needed for Wheezing.  clopidogreL (PLAVIX) 75 mg tab Take 1 Tablet by mouth daily. (Patient not taking: Reported on 4/4/2022)    budesonide-formoteroL (Symbicort) 160-4.5 mcg/actuation HFAA Take 2 Puffs by inhalation two (2) times a day. (Patient not taking: Reported on 4/4/2022)     No current facility-administered medications for this visit. Facility-Administered Medications Ordered in Other Visits   Medication Dose Route Frequency    sodium chloride (NS) flush 10 mL  10 mL IntraVENous PRN       I have reviewed the nurses notes, vitals, problem list, allergy list, medical history, family, social history and medications. REVIEW OF SYMPTOMS      General: Pt denies excessive weight gain or loss. Pt is able to conduct ADL's  HEENT: Denies blurred vision, headaches, hearing loss, epistaxis and difficulty swallowing. Respiratory: Denies cough, congestion, shortness of breath, KNOX, wheezing or stridor.   Cardiovascular: Denies precordial pain, palpitations, edema or PND  Gastrointestinal: Denies poor appetite, indigestion, abdominal pain or blood in stool  Genitourinary: Denies hematuria, dysuria, increased urinary frequency  Musculoskeletal: Denies joint pain or swelling from muscles or joints  Neurologic: Denies tremor, paresthesias, headache, or sensory motor disturbance  Psychiatric: Denies confusion, insomnia, depression  Integumentray: Denies rash, itching or ulcers. Hematologic: Denies easy bruising, bleeding       PHYSICAL EXAMINATION      Vitals: see vitals section  General: Well developed, in no acute distress. HEENT: No jaundice, oral mucosa moist, no oral ulcers  Neck: Supple, no stiffness, no lymphadenopathy, supple  Heart:  Normal S1/S2 negative S3 or S4. Regular, no murmur, gallop or rub, no jugular venous distention  Respiratory: Clear bilaterally x 4, no wheezing or rales  Abdomen:   Soft, non-tender, bowel sounds are active. Extremities:  No edema, normal cap refill, no cyanosis. Musculoskeletal: No clubbing, no deformities  Neuro: A&Ox3, speech clear, gait stable, cooperative, no focal neurologic deficits  Skin: Skin color is normal. No rashes or lesions.  Non diaphoretic, moist.  Vascular: 2+ pulses symmetric in all extremities       DIAGNOSTIC DATA      EKG: sinus rhythm with ventricular bigeminy    Visit Vitals  /88 (BP 1 Location: Left upper arm, BP Patient Position: Sitting, BP Cuff Size: Adult)   Pulse 66   Ht 5' (1.524 m)   Wt 138 lb (62.6 kg)   SpO2 96%   BMI 26.95 kg/m²          LABORATORY DATA      Lab Results   Component Value Date/Time    WBC 19.9 (H) 08/10/2021 01:42 AM    HGB 15.8 08/10/2021 01:42 AM    HCT 48.6 (H) 08/10/2021 01:42 AM    PLATELET 737 12/20/6638 01:42 AM    MCV 97.6 08/10/2021 01:42 AM      Lab Results   Component Value Date/Time    Sodium 136 08/10/2021 01:42 AM    Potassium HEMOLYZED,RECOLLECT REQUESTED 08/10/2021 01:42 AM    Chloride 106 08/10/2021 01:42 AM    CO2 28 08/10/2021 01:42 AM Anion gap 2 (L) 08/10/2021 01:42 AM    Glucose 163 (H) 08/10/2021 01:42 AM    BUN 25 (H) 08/10/2021 01:42 AM    Creatinine 0.79 08/10/2021 01:42 AM    BUN/Creatinine ratio 32 (H) 08/10/2021 01:42 AM    GFR est AA >60 08/10/2021 01:42 AM    GFR est non-AA >60 08/10/2021 01:42 AM    Calcium 8.2 (L) 08/10/2021 01:42 AM    Bilirubin, total 0.2 08/04/2021 05:12 PM    Alk. phosphatase 98 08/04/2021 05:12 PM    Protein, total 7.2 08/04/2021 05:12 PM    Albumin 3.1 (L) 08/10/2021 01:42 AM    Globulin 3.6 08/04/2021 05:12 PM    A-G Ratio 1.0 (L) 08/04/2021 05:12 PM    ALT (SGPT) 26 08/04/2021 05:12 PM         ASSESSMENT/PLAN      1. PVCs  2. NSVT  3. Palpitations  4. Carotid stenosis  5. Patent foramen ovale              A. Left-to-right shunt      She will discuss nicotine patch with her PCP/ pulmonary team as she would like to quit smoking. Recommend 24 hour monitor to evaluate PVC burden and will consider diltiazem for PVC suppression if PVC burden is elevated as she was intolerant of metoprolol previously. Suspect her fatigue could be related to high PVC burden with decreased EF. Consider repeat echo once PVCs are managed. FOLLOW UP      Pending monitor results. Thank you, Estee Jose NP for allowing me to participate in the care of this extraordinarily pleasant female. Please do not hesitate to contact me for further questions/concerns.      [address] [facility] [address]  [address] [facility], [facility], [address] [place] [address]  [phone] / [phone] Fax   [phone] / [phone] Fax

## 2022-04-04 NOTE — PROGRESS NOTES
Nevaeh Fofana is a 62 y.o. female    Visit Vitals  /88 (BP 1 Location: Left upper arm, BP Patient Position: Sitting, BP Cuff Size: Adult)   Pulse 66   Ht 5' (1.524 m)   Wt 138 lb (62.6 kg)   SpO2 96%   BMI 26.95 kg/m²       Chief Complaint   Patient presents with    Other     BRADYCARDIA    Fatigue       Chest pain NO  SOB YES  Dizziness NO  Swelling NO  Recent hospital visit NO  Refills PLAVIX  COVID VACCINE STATUS YES  HAD COVID?  NO

## 2022-04-07 ENCOUNTER — TELEPHONE (OUTPATIENT)
Dept: CARDIOLOGY CLINIC | Age: 62
End: 2022-04-07

## 2022-04-07 RX ORDER — DILTIAZEM HYDROCHLORIDE 120 MG/1
120 CAPSULE, COATED, EXTENDED RELEASE ORAL DAILY
Qty: 30 CAPSULE | Refills: 3 | Status: SHIPPED | OUTPATIENT
Start: 2022-04-07 | End: 2022-05-04 | Stop reason: SDUPTHER

## 2022-04-25 ENCOUNTER — CLINICAL SUPPORT (OUTPATIENT)
Dept: CARDIOLOGY CLINIC | Age: 62
End: 2022-04-25
Payer: MEDICAID

## 2022-04-25 DIAGNOSIS — I49.3 PVC (PREMATURE VENTRICULAR CONTRACTION): Primary | ICD-10-CM

## 2022-04-25 PROCEDURE — 93225 XTRNL ECG REC<48 HRS REC: CPT | Performed by: SPECIALIST

## 2022-04-25 PROCEDURE — 93227 XTRNL ECG REC<48 HR R&I: CPT | Performed by: SPECIALIST

## 2022-05-04 ENCOUNTER — DOCUMENTATION ONLY (OUTPATIENT)
Dept: CARDIOLOGY CLINIC | Age: 62
End: 2022-05-04

## 2022-05-04 ENCOUNTER — TELEPHONE (OUTPATIENT)
Dept: CARDIOLOGY CLINIC | Age: 62
End: 2022-05-04

## 2022-05-04 RX ORDER — DILTIAZEM HYDROCHLORIDE 240 MG/1
240 CAPSULE, COATED, EXTENDED RELEASE ORAL DAILY
Qty: 30 CAPSULE | Refills: 1 | Status: SHIPPED | OUTPATIENT
Start: 2022-05-04 | End: 2022-06-09 | Stop reason: DRUGHIGH

## 2022-05-04 NOTE — PROGRESS NOTES
Reviewed 24 hour holter monitor which shows 41% PVC burden. Recommend starting diltiazem 120 mg daily and repeating 24 hour holter monitor two weeks after start. Keep follow up with Dr. Herminio Mccain in June. Titrate diltiazem as tolerated for PVC burden >10%. Once PVCs are less than 10%, repeat echo. Consider AAD versus PVC ablation if she does not tolerate Diltiazem given previous intolerance to Metoprolol.      Tatiana Coughlin, NP

## 2022-05-06 NOTE — PROGRESS NOTES
Theodor Null,    If you are doing well. Baby is beautiful as always. She has a beautiful smile. I was just wondering what your thoughts were about getting an MRI on this lady and seeing if she has evidence of scar that may be the nidus for her PVCs?     We have a great weekend    Ruben Polanco

## 2022-05-06 NOTE — TELEPHONE ENCOUNTER
Kong Sandhu NP at 05/04/22 6900    Status: Sign when Signing Visit   Reviewed 24 hour holter monitor which shows 41% PVC burden. Recommend starting diltiazem 120 mg daily and repeating 24 hour holter monitor two weeks after start. Keep follow up with Dr. Ines Milian in June. Titrate diltiazem as tolerated for PVC burden >10%. Once PVCs are less than 10%, repeat echo.  Consider AAD versus PVC ablation if she does not tolerate Diltiazem given previous intolerance to Metoprolol.      Farideh Krishna NP        Patient notified of above by Allen Crowder NP.

## 2022-05-18 ENCOUNTER — TELEPHONE (OUTPATIENT)
Dept: CARDIOLOGY CLINIC | Age: 62
End: 2022-05-18

## 2022-05-18 NOTE — TELEPHONE ENCOUNTER
Patient is calling to notify us that the Diltiazem 240mg is making her very nauseous and would like dome advice on what to do. Please Advise.        [phone]

## 2022-05-18 NOTE — TELEPHONE ENCOUNTER
BUCKY Archer RN  Caller: Unspecified (Today, 11:29 AM)  Change admin time to evening with her dinner to avoid stomach upset, increase hydration and see if it improves. If not, we can change medications. Called patient, ID verified using two patient identifiers. Notified patient of Ilya Montana NP's recommendations above. Patient verbalized understanding and will call with any other questions.

## 2022-05-25 ENCOUNTER — CLINICAL SUPPORT (OUTPATIENT)
Dept: CARDIOLOGY CLINIC | Age: 62
End: 2022-05-25
Payer: MEDICAID

## 2022-05-25 DIAGNOSIS — I49.3 PVC (PREMATURE VENTRICULAR CONTRACTION): Primary | ICD-10-CM

## 2022-05-25 PROCEDURE — 93225 XTRNL ECG REC<48 HRS REC: CPT | Performed by: SPECIALIST

## 2022-05-25 PROCEDURE — 93227 XTRNL ECG REC<48 HR R&I: CPT | Performed by: SPECIALIST

## 2022-05-25 NOTE — PROGRESS NOTES
Applied 24 hr holter per Xiomara Hathaway NP dx: PVC. Pt has #[id number]   Chargeable visit.   [facility]

## 2022-06-07 ENCOUNTER — TELEPHONE (OUTPATIENT)
Dept: CARDIOLOGY CLINIC | Age: 62
End: 2022-06-07

## 2022-06-09 RX ORDER — DILTIAZEM HYDROCHLORIDE 300 MG/1
300 CAPSULE, EXTENDED RELEASE ORAL DAILY
Qty: 30 CAPSULE | Refills: 5 | Status: SHIPPED | OUTPATIENT
Start: 2022-06-09

## 2022-06-09 RX ORDER — DILTIAZEM HYDROCHLORIDE 300 MG/1
300 CAPSULE, EXTENDED RELEASE ORAL DAILY
COMMUNITY
End: 2022-06-09 | Stop reason: SDUPTHER

## 2022-06-09 NOTE — TELEPHONE ENCOUNTER
She did have 40% PVC burden even on the 240 of diltiazem. I do not think this is going to make a big difference. I think we can go up to 300 mg but I would like for her to be seen by EP Dr. Shannon Zapata or Dr. Asha Mcduffie.

## 2022-06-15 DIAGNOSIS — I49.3 FREQUENT PVCS: Primary | ICD-10-CM

## 2022-07-11 NOTE — PROGRESS NOTES
CARDIOLOGY OFFICE NOTE    Stanley Roach MD, [address], [place], [address] [facility]  Phone [phone]; Fax [phone]  Mobile [phone]   Voice Mail [phone]    LAST OFFICE VISIT : 8/28/2020  Karen Lora NP       ATTENTION:   This medical record was transcribed using an electronic medical records/speech recognition system. Although proofread, it may and can contain electronic, spelling and other errors. Corrections may be executed at a later time. Please feel free to contact us for any clarifications as needed. ICD-10-CM ICD-9-CM   1. Premature ventricular contraction  I49.3 427.69   2. PVC (premature ventricular contraction)  I49.3 427.69   3. Chronic fatigue  R53.82 780.79   4. PFO (patent foramen ovale)  Q21.1 745.5   5. Essential hypertension  I10 401.9   6. Dyslipidemia  E78.5 272.4   7. Chronic obstructive pulmonary disease, unspecified COPD type ([facility]  J44.9 496   8. Type 2 diabetes mellitus without complication, without long-term current use of insulin ([facility])  E11.9 250.00            Brenden Duncan is a 62 y.o. female with  referred for   PFO hypertension and dyslipidemia  . presyncope or fatigue. Cardiac risk factors: smoking/ tobacco exposure, dyslipidemia, hypertension, post-menopausal  I have personally obtained the history from the patient. HISTORY OF PRESENTING ILLNESS   Previous note:  Mrs. Yuri Mcnair was followed by Dr. Marita Giron in the past she is a 59-year-old lady who has a history of a left frontoparietal stroke with dysarthria and right arm weakness. She did have a MRI of her head that showed a left frontoparietal infarct suggestive of embolic etiology. She had an echo at that time that showed a significant patent foramen ovale that was patent felt to be cardioembolic. She subsequently underwent a PFO closure on 6/5/2020. This was performed by Dr. Xin Nascimento.    Is doing well since I last saw her her main complaint is fatigue. She does have a distal LAD lesion based on a cath that was done of 60%. No intervention was done because it was felt not to be the cause of her elevated troponin during her last hospitalization. She is still smoking. She did have a Holter monitor for 24 hours and showed her PVC burden was 41%. She is seeing the electrophysiologist Princess Urbano and on the beginning of September will have an ablation for her PVCs.   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    Weakness of right upper extremity 04/05/2020    Hypothyroidism 04/03/2020    Dyslipidemia 04/03/2020    COPD (chronic obstructive pulmonary disease) ([address] 04/03/2020    HTN (hypertension), benign 04/03/2020    Acute CVA (cerebrovascular accident) ([address] 04/03/2020    PFO (patent foramen ovale) 04/03/2020           PAST MEDICAL HISTORY     Past Medical History:   Diagnosis Date    Chronic obstructive pulmonary disease ([address]     Diabetes ([address]     Borderline    Hypercholesteremia     Hypertension     Stroke ([facility]     Thyroid disease            PAST SURGICAL HISTORY     Past Surgical History:   Procedure Laterality Date    HX APPENDECTOMY      HX CHOLECYSTECTOMY      HX GYN      hysterectomy    CT CARDIAC SURG PROCEDURE UNLIST      cath          ALLERGIES     No Known Allergies       FAMILY HISTORY     Family History   Problem Relation Age of Onset    Stroke Neg Hx     negative for cardiac disease       SOCIAL HISTORY     Social History     Socioeconomic History    Marital status: SINGLE   Tobacco Use    Smoking status: Current Every Day Smoker     Packs/day: 1.00    Smokeless tobacco: Never Used   Substance and Sexual Activity    Alcohol use: Never    Drug use: Never         MEDICATIONS     Current Outpatient Medications   Medication Sig    dilTIAZem ER (TIAZAC) 300 mg capsule Take 1 Capsule by mouth daily.  predniSONE (DELTASONE) 20 mg tablet Take  by mouth daily (with breakfast).  amoxicillin/potassium clav (AUGMENTIN PO) Take  by mouth. UNSURE OF THE DOSAGE    rosuvastatin (CRESTOR) 40 mg tablet Take 1 Tablet by mouth nightly.  isosorbide mononitrate ER (IMDUR) 30 mg tablet Take 1 Tablet by mouth daily.  acetaminophen (TYLENOL) 325 mg tablet Take 650 mg by mouth every six (6) hours as needed for Pain (Mild to moderate pain).  montelukast (SINGULAIR) 10 mg tablet Take 10 mg by mouth every evening.  traMADoL (ULTRAM) 50 mg tablet Take 50 mg by mouth every eight (8) hours as needed for Pain (Severe pain).  albuterol-ipratropium (DUO-NEB) 2.5 mg-0.5 mg/3 ml nebu 3 mL by Nebulization route every four (4) hours as needed for Wheezing, Shortness of Breath or Respiratory Distress.  aspirin 81 mg chewable tablet Take 81 mg by mouth daily.  PARoxetine (PaxiL) 20 mg tablet Take 20 mg by mouth daily.  albuterol (PROVENTIL HFA, VENTOLIN HFA, PROAIR HFA) 90 mcg/actuation inhaler Take 2 Puffs by inhalation every six (6) hours as needed for Wheezing. No current facility-administered medications for this visit. Facility-Administered Medications Ordered in Other Visits   Medication Dose Route Frequency    sodium chloride (NS) flush 10 mL  10 mL IntraVENous PRN       I have reviewed the nurses notes, vitals, problem list, allergy list, medical history, family, social history and medications. REVIEW OF SYMPTOMS   Pertinent positives per HPI  General: Pt denies excessive weight gain or loss.  Pt is able to conduct ADL's  HEENT: Denies blurred vision, headaches, hearing loss, epistaxis and difficulty swallowing. Respiratory: Denies cough, congestion, shortness of breath, KNOX, wheezing or stridor. Cardiovascular: Denies precordial pain, palpitations, edema or PND  Gastrointestinal: Denies poor appetite, indigestion, abdominal pain or blood in stool  Genitourinary: Denies hematuria, dysuria, increased urinary frequency  Musculoskeletal: Denies joint pain or swelling from muscles or joints  Neurologic: Denies tremor, paresthesias, headache, or sensory motor disturbance  Psychiatric: Denies confusion, insomnia, depression  Integumentray: Denies rash, itching or ulcers. Hematologic: Denies easy bruising, bleeding     PHYSICAL EXAMINATION      Vitals:    07/12/22 1404   BP: 110/60   Pulse: 63   SpO2: 95%   Weight: 140 lb (63.5 kg)   Height: 5' (1.524 m)     General: Well developed, in no acute distress. HEENT: No jaundice, oral mucosa moist, no oral ulcers  Neck: Supple, no stiffness, no lymphadenopathy, supple  Heart:  Normal S1/S2 negative S3 or S4. Regular, no murmur, gallop or rub, no jugular venous distention  Respiratory: Wheezing at her bases bilaterally on expiration and rhonchi on expiration  Extremities:  No edema, normal cap refill, no cyanosis. Musculoskeletal: No clubbing, no deformities  Neuro: A&Ox3, speech clear, gait stable, cooperative, no focal neurologic deficits  Skin: Skin color is normal. No rashes or lesions. Non diaphoretic, moist.             DIAGNOSTIC DATA     1. Neck CTA 4/2020   - moderate bilateral ICA disease (left 60%)     2. Echo   4/3/20 - EF 60%, PFO significant right to left shunt   8/31/20 - EF 60-65%. Normal cavity size and wall thickness. No WMA. 30 mm [place] Cardioform device closure device present in expected position without residual shunting. 3. Venous duplex    4/4/20 - no DVT     4. Event recorder    April 2020 - frequent PVCs, multifocal, short runs NSVT     5. Stress Test   Lexiscan cardiolite 5/1/20 - normal perfusion, EF 61%     6.  Lipids   4/5/20- , HDL 43, .8,        7. PFO Closure -6/5/20   NO pulmonary HTN   Small sized Fossa ovalis defect/ASD with previously document bidirectional shunt and stroke   ICE guided closure of the ASD with 30 mm [place] cardioform device. LABORATORY DATA            Lab Results   Component Value Date/Time    WBC 19.9 (H) 08/10/2021 01:42 AM    HGB 15.8 08/10/2021 01:42 AM    HCT 48.6 (H) 08/10/2021 01:42 AM    PLATELET 755 39/68/9734 01:42 AM    MCV 97.6 08/10/2021 01:42 AM      Lab Results   Component Value Date/Time    Sodium 136 08/10/2021 01:42 AM    Potassium HEMOLYZED,RECOLLECT REQUESTED 08/10/2021 01:42 AM    Chloride 106 08/10/2021 01:42 AM    CO2 28 08/10/2021 01:42 AM    Anion gap 2 (L) 08/10/2021 01:42 AM    Glucose 163 (H) 08/10/2021 01:42 AM    BUN 25 (H) 08/10/2021 01:42 AM    Creatinine 0.79 08/10/2021 01:42 AM    BUN/Creatinine ratio 32 (H) 08/10/2021 01:42 AM    GFR est AA >60 08/10/2021 01:42 AM    GFR est non-AA >60 08/10/2021 01:42 AM    Calcium 8.2 (L) 08/10/2021 01:42 AM    Bilirubin, total 0.2 08/04/2021 05:12 PM    Alk.  phosphatase 98 08/04/2021 05:12 PM    Protein, total 7.2 08/04/2021 05:12 PM    Albumin 3.1 (L) 08/10/2021 01:42 AM    Globulin 3.6 08/04/2021 05:12 PM    A-G Ratio 1.0 (L) 08/04/2021 05:12 PM    ALT (SGPT) 26 08/04/2021 05:12 PM           ASSESSMENT/RECOMMENDATIONS:.     1) PFO complicated by left frontoparietal strokes status post closure 6/5/2020  -Has been 2 years PFO closure    2)COPD  -Essential going forward that she stop smoking she definitely has wheezing on expiration at the bases bilaterally  -She is being followed by pulmonary    3)Complex ventricular ectopy   -Myocardial perfusion study and EF normal  -She is no longer taking metoprolol and is on Cardizem D3 100 mg a day  -Scheduled for a PVC ablation I believe     5) dyslipidemia LDL not at goal at 117 and if it remains elevated I would switch her Lipitor to Crestor 40  -Needs cholesterol checked soon  Lab Results Component Value Date/Time    Cholesterol, total 188 08/05/2021 06:20 AM    HDL Cholesterol 47 08/05/2021 06:20 AM    LDL, calculated 106.6 (H) 08/05/2021 06:20 AM    VLDL, calculated 34.4 08/05/2021 06:20 AM    Triglyceride 172 (H) 08/05/2021 06:20 AM    CHOL/HDL Ratio 4.0 08/05/2021 06:20 AM         6) hypertension  -BP is is at goal    Follow-up with me after ablation in October      No orders of the defined types were placed in this encounter. We discussed the expected course, resolution and complications of the diagnosis(es) in detail. Medication risks, benefits, costs, interactions, and alternatives were discussed as indicated. I advised him to contact the office if his condition worsens, changes or fails to improve as anticipated. He expressed understanding with the diagnosis(es) and plan          Follow-up and Dispositions  ·   Return in about 6 months (around 1/12/2023). I have discussed the diagnosis with  Rocael Rodríguez and the intended plan as seen in the above orders. Questions were answered concerning future plans. I have discussed medication side effects and warnings with the patient as well. Thank you,  Joesph Beltran NP for involving me in the care of  Rocael Rodríguez. Please do not hesitate to contact me for further questions/concerns. Stanley Patterson MD, [facility] [address]      [facility], [address] [place] [address] [address]      [phone] / [phone] Fax

## 2022-07-11 NOTE — PATIENT INSTRUCTIONS

## 2022-07-12 ENCOUNTER — OFFICE VISIT (OUTPATIENT)
Dept: CARDIOLOGY CLINIC | Age: 62
End: 2022-07-12
Payer: MEDICAID

## 2022-07-12 VITALS
WEIGHT: 140 LBS | DIASTOLIC BLOOD PRESSURE: 60 MMHG | OXYGEN SATURATION: 95 % | HEIGHT: 60 IN | BODY MASS INDEX: 27.48 KG/M2 | SYSTOLIC BLOOD PRESSURE: 110 MMHG | HEART RATE: 63 BPM

## 2022-07-12 DIAGNOSIS — E11.9 TYPE 2 DIABETES MELLITUS WITHOUT COMPLICATION, WITHOUT LONG-TERM CURRENT USE OF INSULIN (HCC): ICD-10-CM

## 2022-07-12 DIAGNOSIS — Q21.12 PFO (PATENT FORAMEN OVALE): ICD-10-CM

## 2022-07-12 DIAGNOSIS — E78.5 DYSLIPIDEMIA: ICD-10-CM

## 2022-07-12 DIAGNOSIS — J44.9 CHRONIC OBSTRUCTIVE PULMONARY DISEASE, UNSPECIFIED COPD TYPE (HCC): ICD-10-CM

## 2022-07-12 DIAGNOSIS — R53.82 CHRONIC FATIGUE: ICD-10-CM

## 2022-07-12 DIAGNOSIS — I49.3 PVC (PREMATURE VENTRICULAR CONTRACTION): ICD-10-CM

## 2022-07-12 DIAGNOSIS — I49.3 PREMATURE VENTRICULAR CONTRACTION: Primary | ICD-10-CM

## 2022-07-12 DIAGNOSIS — I10 ESSENTIAL HYPERTENSION: ICD-10-CM

## 2022-07-12 PROCEDURE — 99214 OFFICE O/P EST MOD 30 MIN: CPT | Performed by: SPECIALIST

## 2022-07-12 NOTE — PROGRESS NOTES
Jaylin Arteaga is a 62 y.o. female    Visit Vitals  /60 (BP 1 Location: Left upper arm, BP Patient Position: Sitting, BP Cuff Size: Adult)   Pulse 63   Ht 5' (1.524 m)   Wt 140 lb (63.5 kg)   SpO2 95%   BMI 27.34 kg/m²       Chief Complaint   Patient presents with    Other     PVS       Chest pain YES  SOB YES  Dizziness NO  Swelling FEET  Recent hospital visit NO  Refills NO  COVID VACCINE STATUS YES  HAD COVID?  NO

## 2022-07-12 NOTE — LETTER
7/12/2022    Patient: Eliza Schulz   YOB: 1960   Date of Visit: 7/12/2022     Silvia Denson NP  [address] [address]  Via Fax: [phone]    Dear Silvia Denson NP,      Thank you for referring Ms. Chris Owen to [facility] for evaluation. My notes for this consultation are attached. If you have questions, please do not hesitate to call me. I look forward to following your patient along with you.       Sincerely,    Zhou Blanco MD

## 2022-07-29 DIAGNOSIS — E78.5 DYSLIPIDEMIA: Primary | ICD-10-CM

## 2022-08-04 ENCOUNTER — DOCUMENTATION ONLY (OUTPATIENT)
Dept: CARDIOLOGY CLINIC | Age: 62
End: 2022-08-04
